# Patient Record
Sex: MALE | Employment: FULL TIME | URBAN - METROPOLITAN AREA
[De-identification: names, ages, dates, MRNs, and addresses within clinical notes are randomized per-mention and may not be internally consistent; named-entity substitution may affect disease eponyms.]

---

## 2020-10-08 ENCOUNTER — TRANSFERRED RECORDS (OUTPATIENT)
Dept: HEALTH INFORMATION MANAGEMENT | Facility: CLINIC | Age: 55
End: 2020-10-08

## 2020-10-08 LAB
ALT SERPL-CCNC: 50 IU/L (ref 8–45)
AST SERPL-CCNC: 46 IU/L (ref 2–40)
CREAT SERPL-MCNC: 1.1 MG/DL (ref 0.57–1.11)
GFR SERPL CREATININE-BSD FRML MDRD: >60 ML/MIN/1.73M2
GLUCOSE SERPL-MCNC: 162 MG/DL (ref 65–100)
INR PPP: 1.1
POTASSIUM SERPL-SCNC: 3.5 MMOL/L (ref 3.5–5)

## 2020-10-09 ENCOUNTER — ANESTHESIA (OUTPATIENT)
Dept: GASTROENTEROLOGY | Facility: CLINIC | Age: 55
DRG: 441 | End: 2020-10-09
Payer: COMMERCIAL

## 2020-10-09 ENCOUNTER — ANESTHESIA EVENT (OUTPATIENT)
Dept: GASTROENTEROLOGY | Facility: CLINIC | Age: 55
DRG: 441 | End: 2020-10-09
Payer: COMMERCIAL

## 2020-10-09 ENCOUNTER — APPOINTMENT (OUTPATIENT)
Dept: GENERAL RADIOLOGY | Facility: CLINIC | Age: 55
DRG: 441 | End: 2020-10-09
Attending: INTERNAL MEDICINE
Payer: COMMERCIAL

## 2020-10-09 ENCOUNTER — HOSPITAL ENCOUNTER (INPATIENT)
Facility: CLINIC | Age: 55
LOS: 3 days | Discharge: HOME | DRG: 441 | End: 2020-10-12
Attending: INTERNAL MEDICINE | Admitting: INTERNAL MEDICINE
Payer: COMMERCIAL

## 2020-10-09 DIAGNOSIS — I85.01 BLEEDING ESOPHAGEAL VARICES, UNSPECIFIED ESOPHAGEAL VARICES TYPE (H): Primary | ICD-10-CM

## 2020-10-09 PROBLEM — K92.2 GI BLEED: Status: ACTIVE | Noted: 2020-10-09

## 2020-10-09 LAB
ABO + RH BLD: NORMAL
ABO + RH BLD: NORMAL
ALBUMIN SERPL-MCNC: 2.9 G/DL (ref 3.4–5)
ALP SERPL-CCNC: 85 U/L (ref 40–150)
ALT SERPL W P-5'-P-CCNC: 52 U/L (ref 0–70)
ANION GAP SERPL CALCULATED.3IONS-SCNC: 5 MMOL/L (ref 3–14)
ANION GAP SERPL CALCULATED.3IONS-SCNC: 5 MMOL/L (ref 3–14)
APTT PPP: 28 SEC (ref 22–37)
AST SERPL W P-5'-P-CCNC: 36 U/L (ref 0–45)
BASE DEFICIT BLDV-SCNC: 3.1 MMOL/L
BASOPHILS # BLD AUTO: 0.1 10E9/L (ref 0–0.2)
BASOPHILS # BLD AUTO: 0.1 10E9/L (ref 0–0.2)
BASOPHILS NFR BLD AUTO: 0.5 %
BASOPHILS NFR BLD AUTO: 0.7 %
BILIRUB SERPL-MCNC: 0.4 MG/DL (ref 0.2–1.3)
BLD GP AB SCN SERPL QL: NORMAL
BLD PROD TYP BPU: NORMAL
BLD UNIT ID BPU: 0
BLD UNIT ID BPU: 0
BLOOD BANK CMNT PATIENT-IMP: NORMAL
BLOOD PRODUCT CODE: NORMAL
BLOOD PRODUCT CODE: NORMAL
BPU ID: NORMAL
BPU ID: NORMAL
BUN SERPL-MCNC: 29 MG/DL (ref 7–30)
BUN SERPL-MCNC: 32 MG/DL (ref 7–30)
CALCIUM SERPL-MCNC: 7.3 MG/DL (ref 8.5–10.1)
CALCIUM SERPL-MCNC: 8.5 MG/DL (ref 8.5–10.1)
CHLORIDE SERPL-SCNC: 112 MMOL/L (ref 94–109)
CHLORIDE SERPL-SCNC: 114 MMOL/L (ref 94–109)
CO2 SERPL-SCNC: 23 MMOL/L (ref 20–32)
CO2 SERPL-SCNC: 24 MMOL/L (ref 20–32)
CREAT SERPL-MCNC: 0.88 MG/DL (ref 0.66–1.25)
CREAT SERPL-MCNC: 0.89 MG/DL (ref 0.66–1.25)
DIFFERENTIAL METHOD BLD: ABNORMAL
DIFFERENTIAL METHOD BLD: ABNORMAL
EOSINOPHIL # BLD AUTO: 0.3 10E9/L (ref 0–0.7)
EOSINOPHIL # BLD AUTO: 0.6 10E9/L (ref 0–0.7)
EOSINOPHIL NFR BLD AUTO: 3 %
EOSINOPHIL NFR BLD AUTO: 3.4 %
ERYTHROCYTE [DISTWIDTH] IN BLOOD BY AUTOMATED COUNT: 15.3 % (ref 10–15)
ERYTHROCYTE [DISTWIDTH] IN BLOOD BY AUTOMATED COUNT: 15.3 % (ref 10–15)
FERRITIN SERPL-MCNC: 11 NG/ML (ref 26–388)
FIBRINOGEN PPP-MCNC: 257 MG/DL (ref 200–420)
GFR SERPL CREATININE-BSD FRML MDRD: >90 ML/MIN/{1.73_M2}
GFR SERPL CREATININE-BSD FRML MDRD: >90 ML/MIN/{1.73_M2}
GLUCOSE BLDC GLUCOMTR-MCNC: 114 MG/DL (ref 70–99)
GLUCOSE BLDC GLUCOMTR-MCNC: 146 MG/DL (ref 70–99)
GLUCOSE BLDC GLUCOMTR-MCNC: 156 MG/DL (ref 70–99)
GLUCOSE BLDC GLUCOMTR-MCNC: 186 MG/DL (ref 70–99)
GLUCOSE SERPL-MCNC: 173 MG/DL (ref 70–99)
GLUCOSE SERPL-MCNC: 192 MG/DL (ref 70–99)
HBA1C MFR BLD: 6.9 % (ref 0–5.6)
HCO3 BLDV-SCNC: 23 MMOL/L (ref 21–28)
HCT VFR BLD AUTO: 30 % (ref 40–53)
HCT VFR BLD AUTO: 33.1 % (ref 40–53)
HGB BLD-MCNC: 10.1 G/DL (ref 13.3–17.7)
HGB BLD-MCNC: 8.4 G/DL (ref 13.3–17.7)
HGB BLD-MCNC: 8.9 G/DL (ref 13.3–17.7)
HGB BLD-MCNC: 9 G/DL (ref 13.3–17.7)
IMM GRANULOCYTES # BLD: 0 10E9/L (ref 0–0.4)
IMM GRANULOCYTES # BLD: 0.1 10E9/L (ref 0–0.4)
IMM GRANULOCYTES NFR BLD: 0.4 %
IMM GRANULOCYTES NFR BLD: 0.7 %
INR PPP: 1.15 (ref 0.86–1.14)
INR PPP: 1.19 (ref 0.86–1.14)
IRON SATN MFR SERPL: 15 % (ref 15–46)
IRON SERPL-MCNC: 62 UG/DL (ref 35–180)
LABORATORY COMMENT REPORT: NORMAL
LACTATE BLD-SCNC: 1.9 MMOL/L (ref 0.7–2)
LYMPHOCYTES # BLD AUTO: 2.3 10E9/L (ref 0.8–5.3)
LYMPHOCYTES # BLD AUTO: 5.2 10E9/L (ref 0.8–5.3)
LYMPHOCYTES NFR BLD AUTO: 24.5 %
LYMPHOCYTES NFR BLD AUTO: 30.2 %
MCH RBC QN AUTO: 27.9 PG (ref 26.5–33)
MCH RBC QN AUTO: 28.3 PG (ref 26.5–33)
MCHC RBC AUTO-ENTMCNC: 29.7 G/DL (ref 31.5–36.5)
MCHC RBC AUTO-ENTMCNC: 30.5 G/DL (ref 31.5–36.5)
MCV RBC AUTO: 93 FL (ref 78–100)
MCV RBC AUTO: 94 FL (ref 78–100)
MONOCYTES # BLD AUTO: 1 10E9/L (ref 0–1.3)
MONOCYTES # BLD AUTO: 2.2 10E9/L (ref 0–1.3)
MONOCYTES NFR BLD AUTO: 10.5 %
MONOCYTES NFR BLD AUTO: 12.6 %
NEUTROPHILS # BLD AUTO: 5.8 10E9/L (ref 1.6–8.3)
NEUTROPHILS # BLD AUTO: 9.1 10E9/L (ref 1.6–8.3)
NEUTROPHILS NFR BLD AUTO: 52.4 %
NEUTROPHILS NFR BLD AUTO: 61.1 %
NRBC # BLD AUTO: 0 10*3/UL
NRBC # BLD AUTO: 0 10*3/UL
NRBC BLD AUTO-RTO: 0 /100
NRBC BLD AUTO-RTO: 0 /100
NUM BPU REQUESTED: 2
O2/TOTAL GAS SETTING VFR VENT: NORMAL %
PCO2 BLDV: 42 MM HG (ref 40–50)
PH BLDV: 7.34 PH (ref 7.32–7.43)
PLATELET # BLD AUTO: 114 10E9/L (ref 150–450)
PLATELET # BLD AUTO: 182 10E9/L (ref 150–450)
PO2 BLDV: 43 MM HG (ref 25–47)
POTASSIUM SERPL-SCNC: 4.5 MMOL/L (ref 3.4–5.3)
POTASSIUM SERPL-SCNC: 4.6 MMOL/L (ref 3.4–5.3)
PROT SERPL-MCNC: 6.3 G/DL (ref 6.8–8.8)
RBC # BLD AUTO: 3.19 10E12/L (ref 4.4–5.9)
RBC # BLD AUTO: 3.57 10E12/L (ref 4.4–5.9)
SARS-COV-2 RNA SPEC QL NAA+PROBE: NEGATIVE
SARS-COV-2 RNA SPEC QL NAA+PROBE: NORMAL
SODIUM SERPL-SCNC: 140 MMOL/L (ref 133–144)
SODIUM SERPL-SCNC: 143 MMOL/L (ref 133–144)
SPECIMEN EXP DATE BLD: NORMAL
SPECIMEN SOURCE: NORMAL
SPECIMEN SOURCE: NORMAL
TIBC SERPL-MCNC: 417 UG/DL (ref 240–430)
TRANSFUSION STATUS PATIENT QL: NORMAL
UPPER GI ENDOSCOPY: NORMAL
UPPER GI ENDOSCOPY: NORMAL
WBC # BLD AUTO: 17.3 10E9/L (ref 4–11)
WBC # BLD AUTO: 9.5 10E9/L (ref 4–11)

## 2020-10-09 PROCEDURE — 86901 BLOOD TYPING SEROLOGIC RH(D): CPT | Performed by: STUDENT IN AN ORGANIZED HEALTH CARE EDUCATION/TRAINING PROGRAM

## 2020-10-09 PROCEDURE — U0003 INFECTIOUS AGENT DETECTION BY NUCLEIC ACID (DNA OR RNA); SEVERE ACUTE RESPIRATORY SYNDROME CORONAVIRUS 2 (SARS-COV-2) (CORONAVIRUS DISEASE [COVID-19]), AMPLIFIED PROBE TECHNIQUE, MAKING USE OF HIGH THROUGHPUT TECHNOLOGIES AS DESCRIBED BY CMS-2020-01-R: HCPCS | Performed by: STUDENT IN AN ORGANIZED HEALTH CARE EDUCATION/TRAINING PROGRAM

## 2020-10-09 PROCEDURE — 83605 ASSAY OF LACTIC ACID: CPT | Performed by: STUDENT IN AN ORGANIZED HEALTH CARE EDUCATION/TRAINING PROGRAM

## 2020-10-09 PROCEDURE — 250N000009 HC RX 250: Performed by: STUDENT IN AN ORGANIZED HEALTH CARE EDUCATION/TRAINING PROGRAM

## 2020-10-09 PROCEDURE — 83036 HEMOGLOBIN GLYCOSYLATED A1C: CPT | Performed by: STUDENT IN AN ORGANIZED HEALTH CARE EDUCATION/TRAINING PROGRAM

## 2020-10-09 PROCEDURE — 94002 VENT MGMT INPAT INIT DAY: CPT

## 2020-10-09 PROCEDURE — C9113 INJ PANTOPRAZOLE SODIUM, VIA: HCPCS | Performed by: STUDENT IN AN ORGANIZED HEALTH CARE EDUCATION/TRAINING PROGRAM

## 2020-10-09 PROCEDURE — 85025 COMPLETE CBC W/AUTO DIFF WBC: CPT | Performed by: STUDENT IN AN ORGANIZED HEALTH CARE EDUCATION/TRAINING PROGRAM

## 2020-10-09 PROCEDURE — 85027 COMPLETE CBC AUTOMATED: CPT | Performed by: STUDENT IN AN ORGANIZED HEALTH CARE EDUCATION/TRAINING PROGRAM

## 2020-10-09 PROCEDURE — 85610 PROTHROMBIN TIME: CPT | Performed by: STUDENT IN AN ORGANIZED HEALTH CARE EDUCATION/TRAINING PROGRAM

## 2020-10-09 PROCEDURE — P9016 RBC LEUKOCYTES REDUCED: HCPCS | Performed by: STUDENT IN AN ORGANIZED HEALTH CARE EDUCATION/TRAINING PROGRAM

## 2020-10-09 PROCEDURE — 250N000012 HC RX MED GY IP 250 OP 636 PS 637: Performed by: STUDENT IN AN ORGANIZED HEALTH CARE EDUCATION/TRAINING PROGRAM

## 2020-10-09 PROCEDURE — 999N001017 HC STATISTIC GLUCOSE BY METER IP

## 2020-10-09 PROCEDURE — 85384 FIBRINOGEN ACTIVITY: CPT | Performed by: STUDENT IN AN ORGANIZED HEALTH CARE EDUCATION/TRAINING PROGRAM

## 2020-10-09 PROCEDURE — 250N000011 HC RX IP 250 OP 636

## 2020-10-09 PROCEDURE — 99222 1ST HOSP IP/OBS MODERATE 55: CPT | Mod: 25 | Performed by: INTERNAL MEDICINE

## 2020-10-09 PROCEDURE — P9041 ALBUMIN (HUMAN),5%, 50ML: HCPCS | Performed by: STUDENT IN AN ORGANIZED HEALTH CARE EDUCATION/TRAINING PROGRAM

## 2020-10-09 PROCEDURE — 36415 COLL VENOUS BLD VENIPUNCTURE: CPT | Performed by: STUDENT IN AN ORGANIZED HEALTH CARE EDUCATION/TRAINING PROGRAM

## 2020-10-09 PROCEDURE — 250N000011 HC RX IP 250 OP 636: Performed by: STUDENT IN AN ORGANIZED HEALTH CARE EDUCATION/TRAINING PROGRAM

## 2020-10-09 PROCEDURE — 36415 COLL VENOUS BLD VENIPUNCTURE: CPT | Performed by: INTERNAL MEDICINE

## 2020-10-09 PROCEDURE — 258N000003 HC RX IP 258 OP 636: Performed by: STUDENT IN AN ORGANIZED HEALTH CARE EDUCATION/TRAINING PROGRAM

## 2020-10-09 PROCEDURE — 0DJ08ZZ INSPECTION OF UPPER INTESTINAL TRACT, VIA NATURAL OR ARTIFICIAL OPENING ENDOSCOPIC: ICD-10-PCS | Performed by: INTERNAL MEDICINE

## 2020-10-09 PROCEDURE — 999N000185 HC STATISTIC TRANSPORT TIME EA 15 MIN

## 2020-10-09 PROCEDURE — 82803 BLOOD GASES ANY COMBINATION: CPT | Performed by: STUDENT IN AN ORGANIZED HEALTH CARE EDUCATION/TRAINING PROGRAM

## 2020-10-09 PROCEDURE — 96372 THER/PROPH/DIAG INJ SC/IM: CPT | Performed by: STUDENT IN AN ORGANIZED HEALTH CARE EDUCATION/TRAINING PROGRAM

## 2020-10-09 PROCEDURE — 43244 EGD VARICES LIGATION: CPT | Mod: 52 | Performed by: INTERNAL MEDICINE

## 2020-10-09 PROCEDURE — 85730 THROMBOPLASTIN TIME PARTIAL: CPT | Performed by: STUDENT IN AN ORGANIZED HEALTH CARE EDUCATION/TRAINING PROGRAM

## 2020-10-09 PROCEDURE — 99152 MOD SED SAME PHYS/QHP 5/>YRS: CPT | Performed by: INTERNAL MEDICINE

## 2020-10-09 PROCEDURE — 86923 COMPATIBILITY TEST ELECTRIC: CPT | Performed by: STUDENT IN AN ORGANIZED HEALTH CARE EDUCATION/TRAINING PROGRAM

## 2020-10-09 PROCEDURE — 80048 BASIC METABOLIC PNL TOTAL CA: CPT | Performed by: STUDENT IN AN ORGANIZED HEALTH CARE EDUCATION/TRAINING PROGRAM

## 2020-10-09 PROCEDURE — 71045 X-RAY EXAM CHEST 1 VIEW: CPT | Mod: 26 | Performed by: RADIOLOGY

## 2020-10-09 PROCEDURE — 43235 EGD DIAGNOSTIC BRUSH WASH: CPT | Performed by: INTERNAL MEDICINE

## 2020-10-09 PROCEDURE — 200N000002 HC R&B ICU UMMC

## 2020-10-09 PROCEDURE — 06L38CZ OCCLUSION OF ESOPHAGEAL VEIN WITH EXTRALUMINAL DEVICE, VIA NATURAL OR ARTIFICIAL OPENING ENDOSCOPIC: ICD-10-PCS | Performed by: INTERNAL MEDICINE

## 2020-10-09 PROCEDURE — 83540 ASSAY OF IRON: CPT | Performed by: STUDENT IN AN ORGANIZED HEALTH CARE EDUCATION/TRAINING PROGRAM

## 2020-10-09 PROCEDURE — 85018 HEMOGLOBIN: CPT | Performed by: STUDENT IN AN ORGANIZED HEALTH CARE EDUCATION/TRAINING PROGRAM

## 2020-10-09 PROCEDURE — 82728 ASSAY OF FERRITIN: CPT | Performed by: STUDENT IN AN ORGANIZED HEALTH CARE EDUCATION/TRAINING PROGRAM

## 2020-10-09 PROCEDURE — 43244 EGD VARICES LIGATION: CPT | Performed by: INTERNAL MEDICINE

## 2020-10-09 PROCEDURE — 999N000065 XR CHEST PORT 1 VW

## 2020-10-09 PROCEDURE — 250N000011 HC RX IP 250 OP 636: Performed by: INTERNAL MEDICINE

## 2020-10-09 PROCEDURE — 999N000157 HC STATISTIC RCP TIME EA 10 MIN

## 2020-10-09 PROCEDURE — G0500 MOD SEDAT ENDO SERVICE >5YRS: HCPCS | Performed by: INTERNAL MEDICINE

## 2020-10-09 PROCEDURE — 370N000001 HC ANESTHESIA TECHNICAL FEE, 1ST 30 MIN: Performed by: INTERNAL MEDICINE

## 2020-10-09 PROCEDURE — 250N000013 HC RX MED GY IP 250 OP 250 PS 637: Performed by: INTERNAL MEDICINE

## 2020-10-09 PROCEDURE — 43235 EGD DIAGNOSTIC BRUSH WASH: CPT | Mod: XE | Performed by: INTERNAL MEDICINE

## 2020-10-09 PROCEDURE — 93005 ELECTROCARDIOGRAM TRACING: CPT

## 2020-10-09 PROCEDURE — 250N000009 HC RX 250: Performed by: INTERNAL MEDICINE

## 2020-10-09 PROCEDURE — 80053 COMPREHEN METABOLIC PANEL: CPT | Performed by: STUDENT IN AN ORGANIZED HEALTH CARE EDUCATION/TRAINING PROGRAM

## 2020-10-09 PROCEDURE — 83550 IRON BINDING TEST: CPT | Performed by: STUDENT IN AN ORGANIZED HEALTH CARE EDUCATION/TRAINING PROGRAM

## 2020-10-09 PROCEDURE — 93010 ELECTROCARDIOGRAM REPORT: CPT | Performed by: INTERNAL MEDICINE

## 2020-10-09 PROCEDURE — 99223 1ST HOSP IP/OBS HIGH 75: CPT | Mod: AI | Performed by: INTERNAL MEDICINE

## 2020-10-09 PROCEDURE — 999N000216 HC STATISTIC ADULT CD FACE TO FACE-NO CHRG

## 2020-10-09 PROCEDURE — 85018 HEMOGLOBIN: CPT | Performed by: INTERNAL MEDICINE

## 2020-10-09 PROCEDURE — 99291 CRITICAL CARE FIRST HOUR: CPT | Mod: GC | Performed by: INTERNAL MEDICINE

## 2020-10-09 PROCEDURE — 86900 BLOOD TYPING SEROLOGIC ABO: CPT | Performed by: STUDENT IN AN ORGANIZED HEALTH CARE EDUCATION/TRAINING PROGRAM

## 2020-10-09 PROCEDURE — 86850 RBC ANTIBODY SCREEN: CPT | Performed by: STUDENT IN AN ORGANIZED HEALTH CARE EDUCATION/TRAINING PROGRAM

## 2020-10-09 PROCEDURE — 5A1935Z RESPIRATORY VENTILATION, LESS THAN 24 CONSECUTIVE HOURS: ICD-10-PCS | Performed by: INTERNAL MEDICINE

## 2020-10-09 RX ORDER — NOREPINEPHRINE BITARTRATE 0.06 MG/ML
.03-.4 INJECTION, SOLUTION INTRAVENOUS CONTINUOUS
Status: DISCONTINUED | OUTPATIENT
Start: 2020-10-09 | End: 2020-10-09

## 2020-10-09 RX ORDER — NALOXONE HYDROCHLORIDE 0.4 MG/ML
.1-.4 INJECTION, SOLUTION INTRAMUSCULAR; INTRAVENOUS; SUBCUTANEOUS
Status: DISCONTINUED | OUTPATIENT
Start: 2020-10-09 | End: 2020-10-12 | Stop reason: HOSPADM

## 2020-10-09 RX ORDER — FENTANYL CITRATE 50 UG/ML
INJECTION, SOLUTION INTRAMUSCULAR; INTRAVENOUS PRN
Status: DISCONTINUED | OUTPATIENT
Start: 2020-10-09 | End: 2020-10-09 | Stop reason: HOSPADM

## 2020-10-09 RX ORDER — NALOXONE HYDROCHLORIDE 0.4 MG/ML
.1-.4 INJECTION, SOLUTION INTRAMUSCULAR; INTRAVENOUS; SUBCUTANEOUS
Status: DISCONTINUED | OUTPATIENT
Start: 2020-10-09 | End: 2020-10-09

## 2020-10-09 RX ORDER — NICOTINE POLACRILEX 4 MG
15-30 LOZENGE BUCCAL
Status: DISCONTINUED | OUTPATIENT
Start: 2020-10-09 | End: 2020-10-12 | Stop reason: HOSPADM

## 2020-10-09 RX ORDER — FENTANYL CITRATE 50 UG/ML
INJECTION, SOLUTION INTRAMUSCULAR; INTRAVENOUS
Status: COMPLETED
Start: 2020-10-09 | End: 2020-10-09

## 2020-10-09 RX ORDER — FENTANYL CITRATE 50 UG/ML
50 INJECTION, SOLUTION INTRAMUSCULAR; INTRAVENOUS ONCE
Status: COMPLETED | OUTPATIENT
Start: 2020-10-09 | End: 2020-10-09

## 2020-10-09 RX ORDER — NADOLOL 20 MG/1
20 TABLET ORAL DAILY
Status: ON HOLD | COMMUNITY
End: 2020-10-12

## 2020-10-09 RX ORDER — PROPOFOL 10 MG/ML
INJECTION, EMULSION INTRAVENOUS PRN
Status: DISCONTINUED | OUTPATIENT
Start: 2020-10-09 | End: 2020-10-09

## 2020-10-09 RX ORDER — METOCLOPRAMIDE HYDROCHLORIDE 5 MG/ML
10 INJECTION INTRAMUSCULAR; INTRAVENOUS ONCE
Status: DISCONTINUED | OUTPATIENT
Start: 2020-10-09 | End: 2020-10-12 | Stop reason: HOSPADM

## 2020-10-09 RX ORDER — CEFTRIAXONE 1 G/1
1 INJECTION, POWDER, FOR SOLUTION INTRAMUSCULAR; INTRAVENOUS EVERY 24 HOURS
Status: DISCONTINUED | OUTPATIENT
Start: 2020-10-09 | End: 2020-10-12

## 2020-10-09 RX ORDER — SIMETHICONE
LIQUID (ML) MISCELLANEOUS PRN
Status: DISCONTINUED | OUTPATIENT
Start: 2020-10-09 | End: 2020-10-09 | Stop reason: HOSPADM

## 2020-10-09 RX ORDER — CEFTRIAXONE 1 G/1
1 INJECTION, POWDER, FOR SOLUTION INTRAMUSCULAR; INTRAVENOUS EVERY 24 HOURS
Status: DISCONTINUED | OUTPATIENT
Start: 2020-10-09 | End: 2020-10-09

## 2020-10-09 RX ORDER — LIDOCAINE 40 MG/G
CREAM TOPICAL
Status: DISCONTINUED | OUTPATIENT
Start: 2020-10-09 | End: 2020-10-12 | Stop reason: HOSPADM

## 2020-10-09 RX ORDER — DEXTROSE MONOHYDRATE 25 G/50ML
25-50 INJECTION, SOLUTION INTRAVENOUS
Status: DISCONTINUED | OUTPATIENT
Start: 2020-10-09 | End: 2020-10-12 | Stop reason: HOSPADM

## 2020-10-09 RX ORDER — ALBUMIN, HUMAN INJ 5% 5 %
500 SOLUTION INTRAVENOUS ONCE
Status: COMPLETED | OUTPATIENT
Start: 2020-10-09 | End: 2020-10-09

## 2020-10-09 RX ORDER — DEXMEDETOMIDINE HYDROCHLORIDE 4 UG/ML
0.2-0.7 INJECTION, SOLUTION INTRAVENOUS CONTINUOUS
Status: DISCONTINUED | OUTPATIENT
Start: 2020-10-09 | End: 2020-10-10

## 2020-10-09 RX ORDER — PANTOPRAZOLE SODIUM 40 MG/1
40 TABLET, DELAYED RELEASE ORAL 2 TIMES DAILY
Status: ON HOLD | COMMUNITY
End: 2020-10-12

## 2020-10-09 RX ORDER — PROPOFOL 10 MG/ML
5-75 INJECTION, EMULSION INTRAVENOUS CONTINUOUS
Status: DISCONTINUED | OUTPATIENT
Start: 2020-10-09 | End: 2020-10-09

## 2020-10-09 RX ADMIN — MIDAZOLAM 2 MG: 1 INJECTION INTRAMUSCULAR; INTRAVENOUS at 16:28

## 2020-10-09 RX ADMIN — PROPOFOL 100 MG: 10 INJECTION, EMULSION INTRAVENOUS at 12:53

## 2020-10-09 RX ADMIN — INSULIN ASPART 1 UNITS: 100 INJECTION, SOLUTION INTRAVENOUS; SUBCUTANEOUS at 13:47

## 2020-10-09 RX ADMIN — PANTOPRAZOLE SODIUM 40 MG: 40 INJECTION, POWDER, FOR SOLUTION INTRAVENOUS at 19:47

## 2020-10-09 RX ADMIN — PROPOFOL 20 MCG/KG/MIN: 10 INJECTION, EMULSION INTRAVENOUS at 16:24

## 2020-10-09 RX ADMIN — FENTANYL CITRATE 50 MCG: 50 INJECTION INTRAMUSCULAR; INTRAVENOUS at 13:48

## 2020-10-09 RX ADMIN — FENTANYL CITRATE 50 MCG: 50 INJECTION INTRAMUSCULAR; INTRAVENOUS at 13:57

## 2020-10-09 RX ADMIN — SODIUM CHLORIDE 1000 ML: 9 INJECTION, SOLUTION INTRAVENOUS at 05:34

## 2020-10-09 RX ADMIN — Medication 50 MCG/HR: at 13:35

## 2020-10-09 RX ADMIN — MIDAZOLAM 2 MG: 1 INJECTION INTRAMUSCULAR; INTRAVENOUS at 13:37

## 2020-10-09 RX ADMIN — Medication 50 MG: at 12:59

## 2020-10-09 RX ADMIN — DEXMEDETOMIDINE 0.3 MCG/KG/HR: 100 INJECTION, SOLUTION, CONCENTRATE INTRAVENOUS at 18:36

## 2020-10-09 RX ADMIN — MIDAZOLAM 2 MG: 1 INJECTION INTRAMUSCULAR; INTRAVENOUS at 14:20

## 2020-10-09 RX ADMIN — ALBUMIN HUMAN 500 ML: 0.05 INJECTION, SOLUTION INTRAVENOUS at 21:37

## 2020-10-09 RX ADMIN — OCTREOTIDE ACETATE 50 MCG/HR: 200 INJECTION, SOLUTION INTRAVENOUS; SUBCUTANEOUS at 05:22

## 2020-10-09 RX ADMIN — PANTOPRAZOLE SODIUM 40 MG: 40 INJECTION, POWDER, FOR SOLUTION INTRAVENOUS at 04:57

## 2020-10-09 RX ADMIN — MIDAZOLAM 2 MG: 1 INJECTION INTRAMUSCULAR; INTRAVENOUS at 18:52

## 2020-10-09 RX ADMIN — PANTOPRAZOLE SODIUM 8 MG/HR: 40 INJECTION, POWDER, FOR SOLUTION INTRAVENOUS at 13:57

## 2020-10-09 RX ADMIN — MIDAZOLAM 2 MG: 1 INJECTION INTRAMUSCULAR; INTRAVENOUS at 13:57

## 2020-10-09 RX ADMIN — SODIUM CHLORIDE, POTASSIUM CHLORIDE, SODIUM LACTATE AND CALCIUM CHLORIDE 1000 ML: 600; 310; 30; 20 INJECTION, SOLUTION INTRAVENOUS at 19:05

## 2020-10-09 RX ADMIN — INSULIN ASPART 1 UNITS: 100 INJECTION, SOLUTION INTRAVENOUS; SUBCUTANEOUS at 16:24

## 2020-10-09 RX ADMIN — FENTANYL CITRATE 50 MCG: 50 INJECTION, SOLUTION INTRAMUSCULAR; INTRAVENOUS at 13:48

## 2020-10-09 RX ADMIN — PROPOFOL 20 MCG/KG/MIN: 10 INJECTION, EMULSION INTRAVENOUS at 14:21

## 2020-10-09 RX ADMIN — SODIUM CHLORIDE, POTASSIUM CHLORIDE, SODIUM LACTATE AND CALCIUM CHLORIDE 1000 ML: 600; 310; 30; 20 INJECTION, SOLUTION INTRAVENOUS at 20:10

## 2020-10-09 RX ADMIN — Medication 50 MCG: at 16:24

## 2020-10-09 RX ADMIN — INSULIN ASPART 1 UNITS: 100 INJECTION, SOLUTION INTRAVENOUS; SUBCUTANEOUS at 08:13

## 2020-10-09 ASSESSMENT — ACTIVITIES OF DAILY LIVING (ADL)
CONCENTRATING,_REMEMBERING_OR_MAKING_DECISIONS_DIFFICULTY: NO
FALL_HISTORY_WITHIN_LAST_SIX_MONTHS: NO
ADLS_ACUITY_SCORE: 21
ADLS_ACUITY_SCORE: 21
TOILETING_ISSUES: NO
DIFFICULTY_COMMUNICATING: NO
VISION_MANAGEMENT: CONTACTS
WALKING_OR_CLIMBING_STAIRS_DIFFICULTY: NO
WEAR_GLASSES_OR_BLIND: YES
DOING_ERRANDS_INDEPENDENTLY_DIFFICULTY: NO
DIFFICULTY_EATING/SWALLOWING: NO
ADLS_ACUITY_SCORE: 19
DRESSING/BATHING_DIFFICULTY: NO

## 2020-10-09 NOTE — CONSULTS
HEPATOLOGY CONSULTATION    Date: 10/09/2020  Date of Admission: 10/9/2020  Reason for Consultation: GIB  Requested by:   Dr. Maxwell  Brief Summary:   We were asked by Dr. Maxwell to evaluate this patient with hematemesis with Hx of alcohol related liver disease and EV      ASSESSMENT AND RECOMMENDATIONS:   Assessment:  55 year old male with a history of alcohol related liver disease, esophageal variceal bleeding s/p banding x2, who is admitted with concern of hematemesis.      Alcohol related liver disease with portal hypertension  Mild thrombocytopenia and coagulopathy  Acute anemia with hematemesis concern for variceal bleeding    Pt had alcohol related liver disease with portal hypertension.  Pt had hx of variceal bleed x2 with recent EGD 4 weeks back, he presented with 2 points drop in his hemoglobin concerning for upper GI source of bleeding and likely to be variceal bleed given his prior Hx. He will require endoscopic evaluation pending the result of his COVID test.     MELD-Na score: 8 at 10/9/2020  1:35 PM  MELD score: 8 at 10/9/2020  1:35 PM  Calculated from:  Serum Creatinine: 0.88 mg/dL (Rounded to 1 mg/dL) at 10/9/2020  1:35 PM  Serum Sodium: 143 mmol/L (Rounded to 137 mmol/L) at 10/9/2020  1:35 PM  Total Bilirubin: 0.4 mg/dL (Rounded to 1 mg/dL) at 10/9/2020  5:01 AM  INR(ratio): 1.19 at 10/9/2020  1:35 PM  Age: 55 years 4 months    Recommendations  --Keep NPO for possible EGD today  --Stat COVID test ordered, result pending  --Continue octreotide infusion  --Continue ABx for course of 5-7 days  --Continue PPI IV BID  --Continue to monitor Hgb and transfuse if <7  --Hold PTA nadolol, can restart once stable later during admission  --Recommend to get US abd with doppler    Gastroenterology outpatient follow up recommendations: pending clinical course    Thank you for involving us in this patient's care. Please do not hesitate to contact the GI service with any questions or concerns.     Pt care plan  discussed with Dr. Leon, hepatology staff physician.    This note was created with voice recognition software, and while reviewed for accuracy, typos may remain.    Samuel Rios MD  GI Fellow  Pager: [phone]  -------------------------------------------------------------------------------------------------------------------           History of Present Illness:   Federico Bentley is a 55 year old male with a history of alcohol related liver disease, esophageal variceal bleeding s/p banding x2, who is admitted with concern of hematemesis.    Patient stated that last night around 9 PM he started having abdominal fullness, 10:30 PM he woke up from sleep 4 episodes of coffee-ground emesis which were similar to his episode of GI bleeding in May 2020, he felt dizzy and presented to the hospital for further evaluation.  Denied any abdominal pain hematochezia or melena, no fever or chills.    Smokes half pack per day  Last alcohol drink was 9/4/20, he has been drinking heavily since February 2020 and before that he used to drink 2-3 drinks per day with couple of drinks over the weekend since age 20.  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  No family history of alcohol use disorder, liver or colon cancer            Past Medical History:   Reviewed and edited as appropriate  No past medical history on file.         Past Surgical History:   Reviewed and edited as appropriate   No past surgical history on file.         Previous Endoscopy:   No results found for this or any previous visit.         Social History:   Reviewed and edited as appropriate  Social History     Socioeconomic History     Marital status: Not on file     Spouse name: Not on file     Number of children: Not on file     Years of education: Not on file     Highest education level: Not on file   Occupational History     Not on file   Social Needs     Financial resource strain: Not on file     Food insecurity      Worry: Not on file     Inability: Not on file     Transportation needs     Medical: Not on file     Non-medical: Not on file   Tobacco Use     Smoking status: Not on file   Substance and Sexual Activity     Alcohol use: Not on file     Drug use: Not on file     Sexual activity: Not on file   Lifestyle     Physical activity     Days per week: Not on file     Minutes per session: Not on file     Stress: Not on file   Relationships     Social connections     Talks on phone: Not on file     Gets together: Not on file     Attends Synagogue service: Not on file     Active member of club or organization: Not on file     Attends meetings of clubs or organizations: Not on file     Relationship status: Not on file     Intimate partner violence     Fear of current or ex partner: Not on file     Emotionally abused: Not on file     Physically abused: Not on file     Forced sexual activity: Not on file   Other Topics Concern     Not on file   Social History Narrative     Not on file            Family History:   Reviewed and edited as appropriate  No family history on file.           Allergies:   Reviewed and edited as appropriate   No Known Allergies         Medications:     Current Facility-Administered Medications   Medication     cefTRIAXone (ROCEPHIN) 1 g vial to attach to  mL bag for ADULTS or NS 50 mL bag for PEDS     glucose gel 15-30 g    Or     dextrose 50 % injection 25-50 mL    Or     glucagon injection 1 mg     [START ON 10/10/2020] influenza recomb quadrivalent PF (FLUBLOK) injection 0.5 mL     insulin aspart (NovoLOG) injection (RAPID ACTING)     lidocaine (LMX4) cream     lidocaine 1 % 0.1-1 mL     melatonin tablet 1 mg     naloxone (NARCAN) injection 0.1-0.4 mg     octreotide (sandoSTATIN) 1,250 mcg in sodium chloride 0.9 % 250 mL     pantoprazole (PROTONIX) IV push injection 40 mg     sodium chloride (PF) 0.9% PF flush 3 mL     sodium chloride (PF) 0.9% PF flush 3 mL             Review of Systems:     A  complete 10 point review of systems was performed and is negative except as noted in the HPI           Physical Exam:   /72   Pulse 75   Temp 97.7  F (36.5  C) (Oral)   Resp 17   Wt 82.9 kg (182 lb 12.2 oz)   SpO2 96%   Wt:   Wt Readings from Last 2 Encounters:   10/09/20 82.9 kg (182 lb 12.2 oz)      Constitutional: cooperative, pleasant, no acute distress  Eyes: Sclera anicteric  Ears/nose/mouth/throat: mucus membranes moist  Neck: supple  CV: No edema  Respiratory: Unlabored breathing  Abd: nondistended, +bs, nontender  Skin: warm, perfused, no jaundice  Neuro: AAO x 3, No asterixis           Data:   Labs and imaging below were independently reviewed and interpreted    BMP  Recent Labs   Lab 10/09/20  0501      POTASSIUM 4.5   CHLORIDE 112*   CONSTANZA 8.5   CO2 23   BUN 29   CR 0.89   *     CBC  Recent Labs   Lab 10/09/20  0501   WBC 9.5   RBC 3.57*   HGB 10.1*   HCT 33.1*   MCV 93   MCH 28.3   MCHC 30.5*   RDW 15.3*   *     INR  Recent Labs   Lab 10/09/20  0501   INR 1.15*     LFTs  Recent Labs   Lab 10/09/20  0501   ALKPHOS 85   AST 36   ALT 52   BILITOTAL 0.4   PROTTOTAL 6.3*   ALBUMIN 2.9*      PANCNo lab results found in last 7 days.    Imaging:  MRI ABD 9/5/20:  IMPRESSION:  1.  Liver demonstrates mild atrophy and nodularity of the serosal surface,  likely reflective of underlying chronic hepatic parenchymal disease and  cirrhosis.    2.  Evidence of portal venous hypertension with splenomegaly, upper abdominal  varices, and trace perihepatic ascites.    3.  No evidence of suspicious liver mass.    EGD 9/4/20:  Impressions/Post-Op Diagnosis:       - Large (> 5 mm) esophageal varices. Banded.       - Clotted blood in the stomach.       - Portal hypertensive gastropathy.       - Normal examined duodenum.       - No specimens collected.    Recommendation:       - Clear liquid diet today.       - Continue IV PPI, octreotide       - Ceftriaxone 1 gm IV daily       - May need pain  medication for chest pain post-banding       - Repeat EGD in 2-3 weeks.       - Can do screening colonoscopy at that time.       ECHO 5/22/20:     Final Conclusion   Visually Estimated EF: 65-70%   Normal LV size and systolic function.   No significant valvular heart disease noted.   Dilated IVC size.      EGD 5/21/20:  Impressions/Post-Op Diagnosis:       - Large (> 5 mm) esophageal varices with red mary signs. Completely        eradicated. Banded.       - Portal hypertensive gastropathy.       - Normal examined duodenum.       - No specimens collected.    Recommendation:       - [facility] patient to [facility] for [facility] care.       - Octreotide 72 hours.       - PPI for 48 hours.       - Patient is not known to have liver disease, will order RUQ U/S with        dopplers.       - Will initiate antibiotics, 5 days total.       - Repeat upper endoscopy in 4 weeks for reassessment.       - Will arrange clinic follow-up in our hepatology clinic.      Attestation:  This patient has been seen and evaluated by me, Daniela Leon.  Discussed with the house staff team or resident(s) and agree with the findings and plan in this note.

## 2020-10-09 NOTE — OR NURSING
EGD with banding at bedside. Sedation and monitoring managed by ICU RN. Left in the care of ICU staff post-procedure.     July Sears RN

## 2020-10-09 NOTE — PROGRESS NOTES
Pt admitted to 4C MICU s/p intubation in endoscopy due to GIB (gastrointestinal bleeding). Pt placed on a CMV 12 500 50% +5. Please see flowsheets for further information.

## 2020-10-09 NOTE — H&P
Internal Medicine History and Physical  [facility], [place]  Date of Admission: October 9, 2020  Chief Complaint: Hematemesis  -----------------------------------------------------------------  History of Present Illness   Federico reports he was feeling a little sick to his stomach today and then at 10 PM threw up 4 times, dark red and black liquid similar to his previous episodes of hematemesis in May and September of this year.  He reports normal brown stools today.  He presented to [facility] emergency department where he was noted to be tachycardic to the 130s and hypotensive to the 70s over 50s which improved after 1 L fluid bolus to systolics over 100.  His tachycardia resolved.  He was given Protonix and Rocephin and was started on octreotide infusion. Initial hemoglobin was stable from prior admission but repeat showed a 2 g drop.  He had no further episodes of hematemesis and was transferred to our hospital due to lack of bed availability at [facility].     He was unaware of any health problems prior to his upper GI bleed in May at which point he was told he had liver disease due to alcohol use and subsequently stopped drinking.  He has not had any alcohol since that hospital admission.  Prior to that admission he was drinking 4-5 drinks a day 4-5 times a week. He is currently in the process of establishing with a gastroenterologist at [facility].  He recently had an MRI abdomen in September which was consistent with the diagnosis of cirrhosis and portal venous hypertension with splenomegaly, upper abdominal varices, and trace perihepatic ascites, and was negative for any liver mass.  During his September admission he underwent an endoscopy with banding of large, over 5 mm, varices.  Endoscopy also revealed portal hypertensive gastropathy.    He also recently establish care with a PCP in the [facility] system. His re-connection with the health system revealed diagnosis of  diabetes mellitus and he was recently started on metformin.      -----------------------------------------------------------------  Assessment & Plan     Federico Bentley is a 55-year-old male with a past medical history of recently diagnosed hepatic cirrhosis secondary to alcohol use complicated by esophageal varices as well as recently diagnosed type 2 diabetes who is transferred from [facility] for upper GI bleed.    #Upper GI bleed  #Esophageal varices  #Alcoholic cirrhosis  Recently diagnosed cirrhosis confirmed on MRI imaging 9/2020, complicated by history of GI bleed x2 in May and September of this year.  Had upper endoscopy in May and  September with banding of large varices.  Yesterday evening, one episode of hematemesis.  Hemoglobin 10 from 12 in September.  Initially hypotensive but fluid responsive.  No further episodes of emesis and currently hemodynamically stable.  Patient is in the process of establishing care with a gastroenterologist at [facility].  -Repeat hemoglobin  -Type and screen  -Octreotide drip (bolused at outside hospital)  -Pantoprazole twice daily IV  -Ceftriaxone 1 g every 24 hours  -Additional 1 L normal saline  -GI consult  -N.p.o. for possible procedure  -Preprocedure COVID test (cannot find test from [facility] in EMR)  -Holding prior to admission nadolol due to bleeding      #Diabetes mellitus type 2  Hemoglobin A1c 7.8 in September 2020.  On metformin 500 mix twice daily at home  -Hypoglycemia protocol  -Sliding scale insulin    #Tobacco use disorder  Current half pack a day smoker.  In pre-contemplative stage.  -Discussed cessation prior to discharge    Diet: Orders Placed This Encounter      NPO per Anesthesia Guidelines for Procedure/Surgery Except for: Meds    Fluids: Status post 1 L at outside hospital  Lines: PIV  Ebrger Catheter: not present  DVT Prophylaxis: Mechanical  Code Status: Full  Expected discharge: 2 - 3 days +    Patient to be staffed in the  morning       Jessica Lozano MD  Medicine-Pediatrics PGY4    [facility]  Pager: [phone]  -----------------------------------------------------------------  Physical Exam   BP 94/66 (BP Location: Left arm)   Pulse 83   Temp 97.7  F (36.5  C) (Oral)   Resp 18   Wt 82.9 kg (182 lb 12.2 oz)   SpO2 96%     GENERAL: Active, alert, in no acute distress.  SKIN: Clear. No significant rash, no stigmata of liver disease  HEENT: Normocephalic. Pupils equal, round, reactive, Extraocular muscles intact. Normal conjunctivae. Nares without discharge. Oral mucosa non-erythematous. No oral lesions.   NECK: Supple, no masses.    LYMPH NODES: No adenopathy  LUNGS: Clear. No rales, rhonchi, wheezing or retractions  HEART: Regular rhythm. Normal S1/S2. No murmurs. Normal pulses.  ABDOMEN: Obese, soft, non-tender, not distended,  Bowel sounds normal.   NEUROLOGIC: No focal findings. Normal strength and tone.   EXTREMITIES: Full range of motion, no deformities     -----------------------------------------------------------------  Additional Patient History  Review of Systems   The 10 point Review of Systems is negative other than noted in the HPI or here.    Past Medical History    Alcoholic cirrhosis  Diverticular disease status post 18 inches removed  Type 2 diabetes  Upper GI bleed  Esophageal varices  Tobacco use disorder    Past Surgical History   Colectomy  Appendectomy    Social History   Lives with wife has 3 children  Denies alcohol use since Labor Day  No recreational drug use  Smokes half a pack cigarettes a day    Family History   Mother: Diabetes mellitus  Father: Cardiovascular disease  Brother: Diabetes mellitus    Prior to Admission Medications   Protonix 20 mg twice daily  Omeprazole 20 mg daily  Nadolol 20mg daily  Metformin 500mg twice daily    Allergies   Allergy to IV contrast    Data: Reviewed in Epic.

## 2020-10-09 NOTE — PROGRESS NOTES
Admitted/transferred from: OSH  Reason for admission/transfer: Hemoptysis   2 RN skin assessment: completed by Latha SAAVEDRA and Salome BLANDON  Result of skin assessment and interventions/actions: Small scab on the L foot is present, cleansed and left open to air. Otherwise skin is intact.   Height, weight, drug calc weight: done  Patient belongings: Clothing and shoes in patient closet, wedding ring continues to be worn by patient.   MDRO education added to care plan: NA  ?

## 2020-10-09 NOTE — ANESTHESIA CARE TRANSFER NOTE
Patient: Federico Bentley    Procedure(s):  ESOPHAGOGASTRODUODENOSCOPY (EGD)    Diagnosis: GIB (gastrointestinal bleeding) [K92.2]  Diagnosis Additional Information: No value filed.    Anesthesia Type:   No value filed.     Note:  Airway :ETT and Ventilator  Patient transferred to:PACU  Comments: Patient EZ mask, Intubated UDVx1 without problems, BBS, EtCO2, Dentition as pre-op.Handoff Report: Identifed the Patient, Identified the Reponsible Provider, Reviewed the pertinent medical history, Discussed the surgical course, Reviewed Intra-OP anesthesia mangement and issues during anesthesia, Set expectations for post-procedure period and Allowed opportunity for questions and acknowledgement of understanding      Vitals: (Last set prior to Anesthesia Care Transfer)    CRNA VITALS  10/9/2020 1246 - 10/9/2020 1316      10/9/2020             NIBP:  100/55    Ht Rate:  100                Electronically Signed By: ELMER Che CRNA  October 9, 2020  1:16 PM

## 2020-10-09 NOTE — PROGRESS NOTES
Gastroenterology Endoscopy Suite Brief Operative Note    Procedure:  Upper endoscopy   Post-operative diagnosis:  EV bleeding s/p banding   Staff Physician:  Dr. Wheatley   Fellow/Assistant(s):  Gabriel   Specimens:  Please see final procedure note for further details.   Findings:  Spurting esophageal varix    Complications:  None.   Condition:  Stable   Recommendations  Diet:  NPO, avoid NG/OG placement  PPI:  Continue PPI infusion then switch to IV BID  Anti-coagulants/platelets:  N/A  Octreotide:  Continue octreotide drip for 72 hrs

## 2020-10-09 NOTE — H&P
MEDICAL ICU H&P  10/09/2020    Date of Hospital Admission: 10/9/20  Date of ICU Admission: 10/9/20  Reason for Critical Care Admission: Intubation for airway protection  Date of Service (when I saw the patient): 10/09/2020    ASSESSMENT: Federico Bentley is a 55 year old male with PMH suspected cirrhosis c/b esophageal varices, diabetes mellitus, and smoking who was admitted on 10/9/2020 for hematemesis found secondary to bleeding esophageal varices. He is admitted to the ICU for intubation due to airway protection in context of initial hematemesis and recent EGD.    CHANGES and MAJOR THINGS TODAY:   - EGD done, esophageal varices banded x3  - Hypertension likely secondary to anxiety and pain during EGD, subsequent hypotension with blood loss and sedation  - Received 2 units blood   - Remains intubated due to airway protection  - Trending hemoglobin  - Per GI, no plans for repeat scope (with risk of dislodging bands)  - Keep intubated overnight    PLAN:    Neuro:  # Pain and sedation  - Fentanyl gtt and boluses  - Versed boluses prn  - Propofol gtt  - Transition to prns with continued propofol pending GI plan    # Hx alcohol use  # Low concern for alcohol withdrawal  Last drink per patient labor day. Can monitor for signs of withdrawal, low concern given timing.    Pulmonary:  # Intubation for airway protection  Initial active GI bleed found secondary to bleeding esophageal varices. Intubated for EGD due to concern for aspiration of large volume hematemesis. Kept intubated while monitoring for rebleeding and airway protection.  - Will keep on ventilator likely for 12-24 hrs to ensure no rebleeding    Ventilation Mode: CMV/AC  (Continuous Mandatory Ventilation/ Assist Control)  FiO2 (%): 50 %  Rate Set (breaths/minute): 12 breaths/min  Tidal Volume Set (mL): 500 mL  PEEP (cm H2O): 5 cmH2O  Oxygen Concentration (%): 50 %  Resp: 13      Cardiovascular:  # Hypotension due to acute blood loss   Hypotension due to blood  loss with actively bleeding esophageal varices, responding to fluid and blood resuscitation  - S/p 2 units pRBCs  - MAPs>60  - Transfuse as needed  - Bolus IVFs as needed  - Currently without tachycardia, source controlled with GI, closer to euvolemia    GI/Nutrition:  # Upper GI Bleed 2/2 bleeding esophageal varices  Acute blood loss secondary to what was found to be bleeding esophageal varices, secondary to portal hypertension   - NPO, avoid NG/OG placement  - IV PPI BID  - Continue octreotide for 72 hours  - Ceftriaxone 5-7 days for SBP prophylaxis (ascites, current GI bleed)  - Transfuse for hgb>7, s/p 2 units pRBCs  - Repeat EGD in 4 weeks for variceal treatment     # Hx decompensated cirrhosis c/b esophageal varices  Of note, diagnosis of cirrhosis via labs and imaging is a bit unclear, with nodular contour of liver, though without significant synthetic dysfunction or bilirubinemia. Raises question of whether portal hypertension and varices is secondary to non cirrhotic causes.    -- HE: no history   -- EV/GV: Last EGD 9/5/2020 with 4 EVs banded  -- HRS: none, Cr 0.88  -- HPS: none  -- Coagulopathy: INR 1.19, platelets 182  -- Anemia: Hgb 9.0  (Baseline = 13-14)  -- Ascites/SBP: perihepatic ascites on MR abdomen 9/2020  -- Hepatitis viruses: HepA/B/C nonimmune  -- MELD-Na: MELD-Na score: 8 at 10/9/2020  1:35 PM  MELD score: 8 at 10/9/2020  1:35 PM  Calculated from:  Serum Creatinine: 0.88 mg/dL (Rounded to 1 mg/dL) at 10/9/2020  1:35 PM  Serum Sodium: 143 mmol/L (Rounded to 137 mmol/L) at 10/9/2020  1:35 PM  Total Bilirubin: 0.4 mg/dL (Rounded to 1 mg/dL) at 10/9/2020  5:01 AM  INR(ratio): 1.19 at 10/9/2020  1:35 PM  Age: 55 years 4 months     1. Daily MELD Labs  2. Abdm U/S with Doppler  3. Ensure sodium restriction to 2000 mg per day and high protein diet once extubated  4. Encourage continued alcohol cessation  5. Avoid hepatotoxic and nephrotoxic medications  6. Chem dep, GI  following      Renal/Fluids/Electrolytes:  No active issues, lactic acid normal     Endocrine:  # TIIDM  Last A1c 7.9%, glucose here 170 - 192.   - Continue LDSS while not receiving feeds  - Glucose checks q4h    ID:  SBP prophylaxis as above    Hematology:    # Leukocytosis   Likely stress related in context of EGD and initial bleed  - Monitor     Musculoskeletal:  No active issues    Skin:  No active issues     General Cares/Prophylaxis:    DVT Prophylaxis: none due to bleeding  GI Prophylaxis: PPI  Restraints: as needed  Family Communication: updated wife  Code Status: Full    Lines/tubes/drains:  - Peripheral IVx4    Disposition:  - Medical ICU     Patient seen and findings/plan discussed with medical ICU staff, Dr. Perera.    Angelo Lemus MD  Internal Medicine, PGY-1  Pager: [phone]    -----------------------------------------------------------------------    HISTORY PRESENTING ILLNESS:     Federico Bentley is a 56 yo man with past medical history of decompensated cirrhosis c/b esophageal varices, DM2, tobacco use disorder who presented with hematemesis 10/8 evening along with abdominal pain as a transfer from [facility]. At [facility], he received pantoprazole, ceftriaxone, and started on an octreotide drip and given a 1 L fluid bolus due to hypotension that corrected. He was transferred here due to bed availability issues, underwent EGD here, where had bleeding esophageal varices bandedx3. Of note, he was intubated prior to undergoing endoscopy and sedated, though became markedly hypertensive and tachycardic just before intervention with endoscopy, which required holding intervention until blood pressure corrected with increased fentanyl and versed infusions (concern for higher sedation tolerance and anxiety while paralyzed).     He is transferred to the ICU due to need for intubation for airway protection with initial active upper GI variceal bleed, for monitoring after intervention with EGD, where he had  "esophageal varices bandedx3.     Labs are notable for hemoglobin 9.0 (baseline 13-14, admission hemoglobin 10.1, 2 g drop noted at [facility]), INR 1.19, normal bilirubin and LFTs. Prior MRI abdomen 9/5/2020 showed \" mild atrophy and nodularity of the serosal surface,\" thought likely to represent cirrhosis, along with mild ascites. He has a prior history of alcohol use, drinking 4-5 drinks/day 4-5 times/week before having an upper GI bleed in 5/2020, at which point he stopped drinking. Apparently from admission H&P says has not had a drink since Labor Day. During that admission, he had bandingx5 esophageal varices. He had another episode of melena 9/5/2020, where he had banding of EVsx4.     REVIEW OF SYSTEMS: negative other than noted above    PAST MEDICAL HISTORY:   Tobacco use  Alcohol use disorder  Diabetes mellitus     SURGICAL HISTORY:  Colectomy (for diverticular disease)  Appendectomy    SOCIAL HISTORY:  From medicine admission H&P:  \"Lives with wife has 3 children  Denies alcohol use since Labor Day  No recreational drug use  Smokes half a pack cigarettes a day\"    FAMILY HISTORY:   No family history on file.  ALLERGIES:   Allergies   Allergen Reactions     Contrast Dye      MEDICATIONS:  No current facility-administered medications on file prior to encounter.        metFORMIN (GLUCOPHAGE) 1000 MG tablet, Take 1,000 mg by mouth 2 times daily (with meals)       nadolol (CORGARD) 20 MG tablet, Take 20 mg by mouth daily       pantoprazole (PROTONIX) 40 MG EC tablet, Take 40 mg by mouth 2 times daily        PHYSICAL EXAMINATION:  Temp:  [97.6  F (36.4  C)-98.3  F (36.8  C)] 97.6  F (36.4  C)  Pulse:  [] 99  Resp:  [5-38] 15  BP: ()/() 68/52  SpO2:  [95 %-100 %] 98 %  General: intubated, sedated though intermittently agitated with EGD requiring increasing sedation  HEENT: atraumatic  Neuro: sedated, intermittently anxious  Pulm/Resp: Clear breath sounds bilaterally without rhonchi, crackles or " wheeze, breathing non-labored  CV: RRR  Abdomen: Soft, non-distended  Incisions/Skin: no pertinent skin changes. No jauddice visualized.    LABS: Reviewed.   Arterial Blood Gases   No lab results found in last 7 days.  Complete Blood Count   Recent Labs   Lab 10/09/20  1441 10/09/20  1335 10/09/20  0501   WBC  --  17.3* 9.5   HGB 9.0* 8.9* 10.1*   PLT  --  182 114*     Basic Metabolic Panel  Recent Labs   Lab 10/09/20  1335 10/09/20  0501    140   POTASSIUM 4.6 4.5   CHLORIDE 114* 112*   CO2 24 23   BUN 32* 29   CR 0.88 0.89   * 173*     Liver Function Tests  Recent Labs   Lab 10/09/20  1335 10/09/20  0501   AST  --  36   ALT  --  52   ALKPHOS  --  85   BILITOTAL  --  0.4   ALBUMIN  --  2.9*   INR 1.19* 1.15*     Coagulation Profile  Recent Labs   Lab 10/09/20  1335 10/09/20  0501   INR 1.19* 1.15*   PTT 28  --        IMAGING:  No results found for this or any previous visit (from the past 24 hour(s)).

## 2020-10-09 NOTE — CONSULTS
10/9/2020    Closing CD consult as pt is intubated.   Should also be noted that CD consult is for placing people in treatment for BEAN.   Nicotine does not need Rule 25 and placement.  Options would be psych for medications or there is a consult for smoking cessation.     JOHN Tariq  [email or web address]  [phone]

## 2020-10-09 NOTE — PLAN OF CARE
Major Shift Events:  Admitted from OSH around 0345. Awake, oriented, and call light appropriate. No c/o pain. Normal sinus rhythm, BP stable but slightly soft. 1L NS bolus given. Room air, sats stable. No current emesis or nausea, continue to monitor closely for hemoptysis. Voiding spontaneously.   Plan: Possible EGD and varices banding, awaiting GI consult. Continue to monitor while awaiting floor bed.   For vital signs and complete assessments, please see documentation flowsheets.

## 2020-10-09 NOTE — PHARMACY-ADMISSION MEDICATION HISTORY
Admission medication history interview status for the 10/9/2020 admission is complete. See Epic admission navigator for allergy information, pharmacy, prior to admission medications and immunization status.     Medication history interview sources:  EPIC chart review and [facility] Dr. Mccall (Rx fill history website); NO patient/family interview    Changes made to PTA medication list (reason)  Added:  all    Additional medication history information (including reliability of information, actions taken by pharmacist):  - Recent fill for ciprofloxacin 500mg BID infection ppx - 3DS       Prior to Admission medications    Medication Sig Last Dose Taking? Auth Provider   metFORMIN (GLUCOPHAGE) 1000 MG tablet Take 1,000 mg by mouth 2 times daily (with meals)  Yes Unknown, Entered By History   nadolol (CORGARD) 20 MG tablet Take 20 mg by mouth daily  Yes Unknown, Entered By History   pantoprazole (PROTONIX) 40 MG EC tablet Take 40 mg by mouth 2 times daily  Yes Unknown, Entered By History     Medication history completed by: Bryanna Hernandez, PharmD

## 2020-10-09 NOTE — OR NURSING
Patient here for EGD.  Actively bleeding varix noted, procedure aborted, and patient requiring intubation for protection of airway (see anesthesia note).  Patient intubated in procedure room, transported back to , and to have procedure done at bedside.

## 2020-10-10 ENCOUNTER — APPOINTMENT (OUTPATIENT)
Dept: ULTRASOUND IMAGING | Facility: CLINIC | Age: 55
DRG: 441 | End: 2020-10-10
Attending: STUDENT IN AN ORGANIZED HEALTH CARE EDUCATION/TRAINING PROGRAM
Payer: COMMERCIAL

## 2020-10-10 ENCOUNTER — APPOINTMENT (OUTPATIENT)
Dept: GENERAL RADIOLOGY | Facility: CLINIC | Age: 55
DRG: 441 | End: 2020-10-10
Attending: INTERNAL MEDICINE
Payer: COMMERCIAL

## 2020-10-10 LAB
ALBUMIN SERPL-MCNC: 2.6 G/DL (ref 3.4–5)
ALP SERPL-CCNC: 52 U/L (ref 40–150)
ALT SERPL W P-5'-P-CCNC: 34 U/L (ref 0–70)
ANION GAP SERPL CALCULATED.3IONS-SCNC: 7 MMOL/L (ref 3–14)
AST SERPL W P-5'-P-CCNC: 25 U/L (ref 0–45)
BASE DEFICIT BLDV-SCNC: 1.4 MMOL/L
BASOPHILS # BLD AUTO: 0 10E9/L (ref 0–0.2)
BASOPHILS NFR BLD AUTO: 0.3 %
BILIRUB SERPL-MCNC: 0.7 MG/DL (ref 0.2–1.3)
BUN SERPL-MCNC: 34 MG/DL (ref 7–30)
CALCIUM SERPL-MCNC: 7.4 MG/DL (ref 8.5–10.1)
CHLORIDE SERPL-SCNC: 113 MMOL/L (ref 94–109)
CO2 SERPL-SCNC: 22 MMOL/L (ref 20–32)
CREAT SERPL-MCNC: 1 MG/DL (ref 0.66–1.25)
DIFFERENTIAL METHOD BLD: ABNORMAL
EOSINOPHIL # BLD AUTO: 0.2 10E9/L (ref 0–0.7)
EOSINOPHIL NFR BLD AUTO: 2.3 %
ERYTHROCYTE [DISTWIDTH] IN BLOOD BY AUTOMATED COUNT: 15.1 % (ref 10–15)
GFR SERPL CREATININE-BSD FRML MDRD: 85 ML/MIN/{1.73_M2}
GLUCOSE BLDC GLUCOMTR-MCNC: 128 MG/DL (ref 70–99)
GLUCOSE BLDC GLUCOMTR-MCNC: 131 MG/DL (ref 70–99)
GLUCOSE BLDC GLUCOMTR-MCNC: 139 MG/DL (ref 70–99)
GLUCOSE BLDC GLUCOMTR-MCNC: 147 MG/DL (ref 70–99)
GLUCOSE BLDC GLUCOMTR-MCNC: 160 MG/DL (ref 70–99)
GLUCOSE BLDC GLUCOMTR-MCNC: 172 MG/DL (ref 70–99)
GLUCOSE SERPL-MCNC: 134 MG/DL (ref 70–99)
HCO3 BLDV-SCNC: 24 MMOL/L (ref 21–28)
HCT VFR BLD AUTO: 26.5 % (ref 40–53)
HGB BLD-MCNC: 7.7 G/DL (ref 13.3–17.7)
HGB BLD-MCNC: 7.8 G/DL (ref 13.3–17.7)
HGB BLD-MCNC: 8.1 G/DL (ref 13.3–17.7)
HGB BLD-MCNC: 8.4 G/DL (ref 13.3–17.7)
IMM GRANULOCYTES # BLD: 0.1 10E9/L (ref 0–0.4)
IMM GRANULOCYTES NFR BLD: 0.8 %
INR PPP: 1.2 (ref 0.86–1.14)
LYMPHOCYTES # BLD AUTO: 1.8 10E9/L (ref 0.8–5.3)
LYMPHOCYTES NFR BLD AUTO: 19.2 %
MAGNESIUM SERPL-MCNC: 1.6 MG/DL (ref 1.6–2.3)
MCH RBC QN AUTO: 28.8 PG (ref 26.5–33)
MCHC RBC AUTO-ENTMCNC: 30.6 G/DL (ref 31.5–36.5)
MCV RBC AUTO: 94 FL (ref 78–100)
MONOCYTES # BLD AUTO: 1 10E9/L (ref 0–1.3)
MONOCYTES NFR BLD AUTO: 10.2 %
NEUTROPHILS # BLD AUTO: 6.2 10E9/L (ref 1.6–8.3)
NEUTROPHILS NFR BLD AUTO: 67.2 %
NRBC # BLD AUTO: 0 10*3/UL
NRBC BLD AUTO-RTO: 0 /100
O2/TOTAL GAS SETTING VFR VENT: ABNORMAL %
PCO2 BLDV: 39 MM HG (ref 40–50)
PH BLDV: 7.39 PH (ref 7.32–7.43)
PHOSPHATE SERPL-MCNC: 3.2 MG/DL (ref 2.5–4.5)
PLATELET # BLD AUTO: 83 10E9/L (ref 150–450)
PO2 BLDV: 43 MM HG (ref 25–47)
POTASSIUM SERPL-SCNC: 4.1 MMOL/L (ref 3.4–5.3)
PROT SERPL-MCNC: 5.1 G/DL (ref 6.8–8.8)
RBC # BLD AUTO: 2.81 10E12/L (ref 4.4–5.9)
SODIUM SERPL-SCNC: 142 MMOL/L (ref 133–144)
WBC # BLD AUTO: 9.3 10E9/L (ref 4–11)

## 2020-10-10 PROCEDURE — 71045 X-RAY EXAM CHEST 1 VIEW: CPT | Mod: 26 | Performed by: RADIOLOGY

## 2020-10-10 PROCEDURE — 83735 ASSAY OF MAGNESIUM: CPT | Performed by: STUDENT IN AN ORGANIZED HEALTH CARE EDUCATION/TRAINING PROGRAM

## 2020-10-10 PROCEDURE — 93975 VASCULAR STUDY: CPT | Mod: 26 | Performed by: RADIOLOGY

## 2020-10-10 PROCEDURE — 120N000011 HC R&B TRANSPLANT UMMC

## 2020-10-10 PROCEDURE — 250N000009 HC RX 250: Performed by: STUDENT IN AN ORGANIZED HEALTH CARE EDUCATION/TRAINING PROGRAM

## 2020-10-10 PROCEDURE — 999N000157 HC STATISTIC RCP TIME EA 10 MIN

## 2020-10-10 PROCEDURE — 36415 COLL VENOUS BLD VENIPUNCTURE: CPT | Performed by: INTERNAL MEDICINE

## 2020-10-10 PROCEDURE — 250N000011 HC RX IP 250 OP 636: Performed by: STUDENT IN AN ORGANIZED HEALTH CARE EDUCATION/TRAINING PROGRAM

## 2020-10-10 PROCEDURE — 85018 HEMOGLOBIN: CPT | Performed by: INTERNAL MEDICINE

## 2020-10-10 PROCEDURE — 99233 SBSQ HOSP IP/OBS HIGH 50: CPT | Mod: GC | Performed by: INTERNAL MEDICINE

## 2020-10-10 PROCEDURE — 3E033XZ INTRODUCTION OF VASOPRESSOR INTO PERIPHERAL VEIN, PERCUTANEOUS APPROACH: ICD-10-PCS | Performed by: INTERNAL MEDICINE

## 2020-10-10 PROCEDURE — 99233 SBSQ HOSP IP/OBS HIGH 50: CPT | Performed by: INTERNAL MEDICINE

## 2020-10-10 PROCEDURE — 36415 COLL VENOUS BLD VENIPUNCTURE: CPT | Performed by: STUDENT IN AN ORGANIZED HEALTH CARE EDUCATION/TRAINING PROGRAM

## 2020-10-10 PROCEDURE — 258N000003 HC RX IP 258 OP 636: Performed by: STUDENT IN AN ORGANIZED HEALTH CARE EDUCATION/TRAINING PROGRAM

## 2020-10-10 PROCEDURE — 250N000013 HC RX MED GY IP 250 OP 250 PS 637: Performed by: INTERNAL MEDICINE

## 2020-10-10 PROCEDURE — 85025 COMPLETE CBC W/AUTO DIFF WBC: CPT | Performed by: STUDENT IN AN ORGANIZED HEALTH CARE EDUCATION/TRAINING PROGRAM

## 2020-10-10 PROCEDURE — 85018 HEMOGLOBIN: CPT | Performed by: STUDENT IN AN ORGANIZED HEALTH CARE EDUCATION/TRAINING PROGRAM

## 2020-10-10 PROCEDURE — 82803 BLOOD GASES ANY COMBINATION: CPT | Performed by: STUDENT IN AN ORGANIZED HEALTH CARE EDUCATION/TRAINING PROGRAM

## 2020-10-10 PROCEDURE — 85610 PROTHROMBIN TIME: CPT | Performed by: STUDENT IN AN ORGANIZED HEALTH CARE EDUCATION/TRAINING PROGRAM

## 2020-10-10 PROCEDURE — 999N001017 HC STATISTIC GLUCOSE BY METER IP

## 2020-10-10 PROCEDURE — 71045 X-RAY EXAM CHEST 1 VIEW: CPT

## 2020-10-10 PROCEDURE — 94003 VENT MGMT INPAT SUBQ DAY: CPT

## 2020-10-10 PROCEDURE — C9113 INJ PANTOPRAZOLE SODIUM, VIA: HCPCS | Performed by: STUDENT IN AN ORGANIZED HEALTH CARE EDUCATION/TRAINING PROGRAM

## 2020-10-10 PROCEDURE — 93975 VASCULAR STUDY: CPT

## 2020-10-10 PROCEDURE — 84100 ASSAY OF PHOSPHORUS: CPT | Performed by: STUDENT IN AN ORGANIZED HEALTH CARE EDUCATION/TRAINING PROGRAM

## 2020-10-10 PROCEDURE — 80053 COMPREHEN METABOLIC PANEL: CPT | Performed by: STUDENT IN AN ORGANIZED HEALTH CARE EDUCATION/TRAINING PROGRAM

## 2020-10-10 RX ORDER — MAGNESIUM SULFATE HEPTAHYDRATE 40 MG/ML
2 INJECTION, SOLUTION INTRAVENOUS DAILY PRN
Status: DISCONTINUED | OUTPATIENT
Start: 2020-10-10 | End: 2020-10-12 | Stop reason: HOSPADM

## 2020-10-10 RX ORDER — DIPHENHYDRAMINE HCL 12.5MG/5ML
25 LIQUID (ML) ORAL ONCE
Status: COMPLETED | OUTPATIENT
Start: 2020-10-10 | End: 2020-10-10

## 2020-10-10 RX ORDER — MAGNESIUM SULFATE HEPTAHYDRATE 40 MG/ML
4 INJECTION, SOLUTION INTRAVENOUS EVERY 4 HOURS PRN
Status: DISCONTINUED | OUTPATIENT
Start: 2020-10-10 | End: 2020-10-12 | Stop reason: HOSPADM

## 2020-10-10 RX ADMIN — MIDAZOLAM 2 MG: 1 INJECTION INTRAMUSCULAR; INTRAVENOUS at 01:39

## 2020-10-10 RX ADMIN — MIDAZOLAM 2 MG: 1 INJECTION INTRAMUSCULAR; INTRAVENOUS at 06:54

## 2020-10-10 RX ADMIN — MIDAZOLAM 2 MG: 1 INJECTION INTRAMUSCULAR; INTRAVENOUS at 06:10

## 2020-10-10 RX ADMIN — MIDAZOLAM 2 MG: 1 INJECTION INTRAMUSCULAR; INTRAVENOUS at 03:03

## 2020-10-10 RX ADMIN — CEFTRIAXONE 1 G: 1 INJECTION, POWDER, FOR SOLUTION INTRAMUSCULAR; INTRAVENOUS at 00:21

## 2020-10-10 RX ADMIN — INSULIN ASPART 1 UNITS: 100 INJECTION, SOLUTION INTRAVENOUS; SUBCUTANEOUS at 08:00

## 2020-10-10 RX ADMIN — MIDAZOLAM 2 MG: 1 INJECTION INTRAMUSCULAR; INTRAVENOUS at 00:07

## 2020-10-10 RX ADMIN — OCTREOTIDE ACETATE 50 MCG/HR: 200 INJECTION, SOLUTION INTRAVENOUS; SUBCUTANEOUS at 01:39

## 2020-10-10 RX ADMIN — PANTOPRAZOLE SODIUM 40 MG: 40 INJECTION, POWDER, FOR SOLUTION INTRAVENOUS at 07:53

## 2020-10-10 RX ADMIN — DEXMEDETOMIDINE 0.6 MCG/KG/HR: 100 INJECTION, SOLUTION, CONCENTRATE INTRAVENOUS at 01:50

## 2020-10-10 RX ADMIN — Medication 50 MCG: at 00:20

## 2020-10-10 RX ADMIN — MAGNESIUM SULFATE 2 G: 2 INJECTION INTRAVENOUS at 05:40

## 2020-10-10 RX ADMIN — PANTOPRAZOLE SODIUM 40 MG: 40 INJECTION, POWDER, FOR SOLUTION INTRAVENOUS at 20:22

## 2020-10-10 RX ADMIN — CEFTRIAXONE 1 G: 1 INJECTION, POWDER, FOR SOLUTION INTRAMUSCULAR; INTRAVENOUS at 22:21

## 2020-10-10 RX ADMIN — INSULIN ASPART 1 UNITS: 100 INJECTION, SOLUTION INTRAVENOUS; SUBCUTANEOUS at 12:06

## 2020-10-10 RX ADMIN — MIDAZOLAM 2 MG: 1 INJECTION INTRAMUSCULAR; INTRAVENOUS at 04:28

## 2020-10-10 RX ADMIN — NOREPINEPHRINE BITARTRATE 0.03 MCG/KG/MIN: 1 INJECTION, SOLUTION, CONCENTRATE INTRAVENOUS at 00:32

## 2020-10-10 RX ADMIN — INSULIN ASPART 1 UNITS: 100 INJECTION, SOLUTION INTRAVENOUS; SUBCUTANEOUS at 20:24

## 2020-10-10 RX ADMIN — DIPHENHYDRAMINE HYDROCHLORIDE 25 MG: 25 SOLUTION ORAL at 14:41

## 2020-10-10 ASSESSMENT — ACTIVITIES OF DAILY LIVING (ADL)
ADLS_ACUITY_SCORE: 21
ADLS_ACUITY_SCORE: 20
ADLS_ACUITY_SCORE: 21

## 2020-10-10 ASSESSMENT — MIFFLIN-ST. JEOR
SCORE: 1714.06
SCORE: 1746

## 2020-10-10 NOTE — PROGRESS NOTES
Admitted/transferred from:   Time of arrival on unit 0297  2 RN  skin assessment completed by Tristen Fisher  Skin assessment finding: intact  Interventions/actions: no action required    Will continue to monitor.

## 2020-10-10 NOTE — PROGRESS NOTES
[facility], [place]  Procedure Note          Extubation:       Federico Bentley  MRN# [id number]   October 10, 2020, 1:32 PM         Patient extubated at: October 10, 2020, 1:00 PM   Supplemental Oxygen: Via nasal cannula at 4 liters per minute   Cough: The cough is good and productive   Secretion Mode: Able to clear   Secretion Amount: Small amount, moderately thick and white / yellow in color   Respiratory Exam:: Breath sounds: equal and clear     Location: bilaterally   Skin Exam:: Patient color: natural   Patient Status: Currently appears comfortable             Recorded by Treva Mesa

## 2020-10-10 NOTE — PROGRESS NOTES
[facility]    Hepatology Follow-up    CC: Hematemesis    Dx: Alcohol related liver disease   Portal hypertension   Acute esophageal variceal bleed s/p banding x4 (10/9/20)    24 hour events: EGD 10/9 with successful banding x4     Hemoglobin stabe ~ 8     Subjective:    Patient intubated in the ICU following commands. No further episodes of hematemesis, no BM as of this morning.    Medications  Current Facility-Administered Medications   Medication Dose Route Frequency     cefTRIAXone  1 g Intravenous Q24H     [START ON 10/10/2020] influenza recomb quadrivalent PF  0.5 mL Intramuscular Prior to discharge     insulin aspart  1-4 Units Subcutaneous Q4H     metoclopramide  10 mg Intravenous Once     pantoprazole (PROTONIX) IV  40 mg Intravenous BID     sodium chloride (PF)  3 mL Intracatheter Q8H       Review of systems  A 10-point review of systems was negative, unless stated above    Examination  BP (!) 70/47   Pulse 69   Temp 98.7  F (37.1  C) (Oral)   Resp 13   Ht 1.829 m (6')   Wt 82.9 kg (182 lb 12.2 oz)   SpO2 100%   BMI 24.79 kg/m      Intake/Output Summary (Last 24 hours) at 10/9/2020 2032  Last data filed at 10/9/2020 2000  Gross per 24 hour   Intake 4871.6 ml   Output 250 ml   Net 4621.6 ml     General: Looks well, NAD, normal color  CVS: No edema.  Resp: ETT  Abdomen: Moderately distended, soft, NT  Extremities: Moves all four extremities equally  Neuro: Alert, responds appropriately to questions  Skin: no rash    Laboratory  Lab Results   Component Value Date     10/09/2020    POTASSIUM 4.6 10/09/2020    CHLORIDE 114 10/09/2020    CO2 24 10/09/2020    BUN 32 10/09/2020    CR 0.88 10/09/2020       Lab Results   Component Value Date    BILITOTAL 0.4 10/09/2020    ALT 52 10/09/2020    AST 36 10/09/2020    ALKPHOS 85 10/09/2020       Lab Results   Component Value Date    WBC 17.3 10/09/2020    HGB 9.0 10/09/2020    MCV 94 10/09/2020     10/09/2020       Lab  Results   Component Value Date    INR 1.19 10/09/2020       MELD-Na score: 8 at 10/9/2020  1:35 PM  MELD score: 8 at 10/9/2020  1:35 PM  Calculated from:  Serum Creatinine: 0.88 mg/dL (Rounded to 1 mg/dL) at 10/9/2020  1:35 PM  Serum Sodium: 143 mmol/L (Rounded to 137 mmol/L) at 10/9/2020  1:35 PM  Total Bilirubin: 0.4 mg/dL (Rounded to 1 mg/dL) at 10/9/2020  5:01 AM  INR(ratio): 1.19 at 10/9/2020  1:35 PM  Age: 55 years 4 months       Radiology      Assessment  55 year old male with a history of alcohol related liver disease, esophageal variceal bleeding s/p banding x2, who is admitted with an acute variceal bleed.    Patient is status post EGD with successful banding, since remains hemodynamically stable without recurrent overt bleeding.  Hemoglobin also stable around 8 today.  ICU plans to extubate and transfer from floor if doing well.  Plan to continue to monitor closely, continue IV octreotide and PPI for 72 hours post intervention.     Recommendations:  -- Continue IV octreotide for 72 hours post-intervention (through 10/12 evening)  -- Continue IV ceftriaxone for SBP prophylaxis to complete a 7 day course (can transition to PO ciprofloxacin when discharging)  -- Continue IV PPI BID  -- Diet: CLD  -- Monitor for recurrent hematemesis or hematochezia, would anticipate melena for up to 48-72 hours post-intervention  -- Trend hemoglobin, transfuse <7 from GI standpoint  -- Repeat EGD in 4 weeks for re-treatment    Patient seen and discussed with Dr. Leon.    Kristel Byrd MD  GI Fellow, PGY-6  P: [phone]    Attestation:  This patient has been seen and evaluated by me, Daniela Leon.  Discussed with the house staff team or resident(s) and agree with the findings and plan in this note.

## 2020-10-10 NOTE — PROGRESS NOTES
MEDICAL ICU PROGRESS NOTE  10/10/2020      Date of Service (when I saw the patient): 10/10/2020    ASSESSMENT: Federico Bentley is a 55 year old male with PMH suspected cirrhosis c/b esophageal varices, diabetes mellitus, and smoking who was admitted on 10/9/2020 for hematemesis found secondary to bleeding esophageal varices. EGD performed 10/9 and required banding x3 for large bleeding esophageal varices.  He is admitted to the ICU for intubation due to airway protection in context of initial hematemesis.     CHANGES and MAJOR THINGS TODAY:   -Discontinue fentanyl  -Discontinue precedex  -PST 7/5/25% with plan to likely extubate  -Hgb checks q 6 hrs    PLAN:     Neuro:  #Acute Pain and sedation  -Discontinue fentanyl gtt and boluses  -Wean off precedex gtt  -Discontinue PRN versed   -Tylenol PRN    #Hx alcohol use  #Low concern for alcohol withdrawal  Last drink per patient labor day. Can monitor for signs of withdrawal, low concern given timing.     Pulmonary:  #Intubation for airway protection  Initial active GI bleed found secondary to bleeding esophageal varices. Intubated for EGD due to concern for aspiration of large volume hematemesis. Kept intubated while monitoring for rebleeding and airway protection.  -CMV: 12/500/5/25%.  SpO2 96%, RR 18, Vt 600s (RSBI 30).  -PST 7/5/25% with plan for likely extubation.    Cardiovascular:  #Hypotension due to acute blood loss   Hypotension due to blood loss with previous bleeding esophageal varices now s/p banding.   - Softer pressures overnight requiring additional 1 L LR + 500 ml 5% alb and briefly on norepi gtt (about 30 minutes) but has been off since 01:00.  -MAPs remain > goal 60     GI/Nutrition:  #Upper GI Bleed 2/2 bleeding esophageal varices  Acute blood loss secondary to what was found to be bleeding esophageal varices, secondary to portal hypertension. Now s/p banding x3.  - Avoid NG/OG placement  - NPO.  Diet adv to 2 gm Na+ once extubated.  - IV PPI BID  -  Continue octreotide for 72 hours   - Ceftriaxone 5-7 days for SBP prophylaxis (ascites, current GI bleed)  - Repeat EGD in 4 weeks for variceal treatment      #Hx decompensated cirrhosis c/b esophageal varices  Of note, diagnosis of cirrhosis via labs and imaging is a bit unclear, with nodular contour of liver, though without significant synthetic dysfunction or bilirubinemia. Raises question of whether portal hypertension and varices is secondary to non cirrhotic causes.    -- No hx of HE, HRS, HPS  -- EV/GV: Last EGD 9/5/2020 with 4 EVs banded  -- Ascites/SBP: perihepatic ascites on MR abdomen 9/2020  -- Hepatitis viruses: HepA/B/C nonimmune  -- Coagulopathy: INR 1.2, platelets 83  -- MELD-Na score: 8 at 10/10/2020  3:07 AM  MELD score: 8 at 10/10/2020  3:07 AM  Calculated from:  Serum Creatinine: 1.00 mg/dL at 10/10/2020  3:07 AM  Serum Sodium: 142 mmol/L (Rounded to 137 mmol/L) at 10/10/2020  3:07 AM  Total Bilirubin: 0.7 mg/dL (Rounded to 1 mg/dL) at 10/10/2020  3:07 AM  INR(ratio): 1.20 at 10/10/2020  3:07 AM  Age: 55 years 4 months      Renal/Fluids/Electrolytes:  No active issues  -BUN/Cr and lytes all WNL  -Condom cath     Endocrine:  #TIIDM  Last A1c 7.9%, Glu 128-160 (while NPO)  - Continue medium sliding scale insulin  - Glucose checks q4h     ID:  SBP prophylaxis as above     Hematology:    #Leukocytosis - resolved  - WBC 9.3 (17.3)  - Likely stress related in context of EGD and initial bleed    #Anemia due to acute blood loss in the setting of bleeding esophageal varices  -Hgb 8.1 < 8.4 < 9  -Received 2 units PRBCs (last unit ~16:00)  -Monitoring Hgb q 6 hrs.    -Transfuse for Hgb <7    Musculoskeletal:  No active issues     Skin:  No active issues      General Cares/Prophylaxis:    DVT Prophylaxis: none due to bleeding  GI Prophylaxis: PPI   Restraints: as needed  Family Communication: updated wife at bedside  Code Status: Full     Lines/tubes/drains:  - ETT  - Peripheral IVx4  - Condom  cath     Disposition:  - Medical ICU      Patient seen and findings/plan discussed with medical ICU staff, Dr. Perlman.    Trupti Valentino NP-C    ====================================  INTERVAL HISTORY:   Overnight had softer pressures requiring an additional 1 liter LR (received total of 2 L on 10/9), 500 ml 5% alb and briefly required norepi gtt (~0030) but off since ~0100.  Patient has been restless/agitated requiring frequent PRN versed doses and frustrated with intubation.  Patient remains easily arousable this morning on precedex and fentanyl and continues to indicate desire for ETT removal.    OBJECTIVE:   1. VITAL SIGNS:   Temp:  [97.6  F (36.4  C)-99.9  F (37.7  C)] 99.9  F (37.7  C)  Pulse:  [] 76  Resp:  [5-38] 15  BP: ()/() 96/57  FiO2 (%):  [25 %-50 %] 25 %  SpO2:  [95 %-100 %] 100 %  Ventilation Mode: (S) CPAP/PS  (Continuous positive airway pressure with Pressure Support)  FiO2 (%): 25 %  Rate Set (breaths/minute): 12 breaths/min  Tidal Volume Set (mL): 500 mL  PEEP (cm H2O): 5 cmH2O  Pressure Support (cm H2O): 7 cmH2O  Oxygen Concentration (%): 25 %  Resp: 15    2. INTAKE/ OUTPUT:   I/O last 3 completed shifts:  In: 4757.69 [I.V.:1657.69; IV Piggyback:2000]  Out: 550 [Urine:550]    3. PHYSICAL EXAMINATION:  General/Neuro: Resting comfortably in bed, arousable to voice, follows commands/lifts head off pillow, appropriately nods to questions, moves all extremities.  HEENT: atraumatic, PEERL, pupils 3 mm, anicteric, moist mucous membranes.  Pulm/Resp: ETT in place on CMV, lungs CTA bilaterally without rhonchi, crackles or wheeze, breathing non-labored  CV: RRR, S1 and S2, no MRG  Abdomen: Soft, non-distended, non-tender.  : condom cather in place, urine yellow and clear  Incisions/Skin: no erythema, rashes    4. LABS:   Arterial Blood Gases   No lab results found in last 7 days.  Complete Blood Count   Recent Labs   Lab 10/10/20  0724 10/10/20  0307 10/09/20  2058 10/09/20  1441  10/09/20  1335 10/09/20  0501   WBC  --  9.3  --   --  17.3* 9.5   HGB 7.8* 8.1* 8.4* 9.0* 8.9* 10.1*   PLT  --  83*  --   --  182 114*     Basic Metabolic Panel  Recent Labs   Lab 10/10/20  0307 10/09/20  1335 10/09/20  0501    143 140   POTASSIUM 4.1 4.6 4.5   CHLORIDE 113* 114* 112*   CO2 22 24 23   BUN 34* 32* 29   CR 1.00 0.88 0.89   * 192* 173*     Liver Function Tests  Recent Labs   Lab 10/10/20  0307 10/09/20  1335 10/09/20  0501   AST 25  --  36   ALT 34  --  52   ALKPHOS 52  --  85   BILITOTAL 0.7  --  0.4   ALBUMIN 2.6*  --  2.9*   INR 1.20* 1.19* 1.15*     Coagulation Profile  Recent Labs   Lab 10/10/20  0307 10/09/20  1335 10/09/20  0501   INR 1.20* 1.19* 1.15*   PTT  --  28  --        5. RADIOLOGY:   Recent Results (from the past 24 hour(s))   XR Chest Port 1 View    Narrative    EXAMINATION:  XR CHEST PORT 1 VW 10/9/2020 6:36 PM.    COMPARISON: None..    HISTORY:  ETT assessment    FINDINGS: Portable AP view of the chest. Endotracheal tube tip  projects over the mid trachea. The trachea is midline. The  cardiomediastinal silhouette is within normal limits. Pulmonary  vasculature is distinct. Borderline low lung volumes. Interstitial  prominence of bilateral lung fields. No pleural effusion or  pneumothorax. The upper abdomen is unremarkable.      Impression    IMPRESSION:   1. Endotracheal tube tip projects over the mid trachea.  2. Interstitial airspace opacity.    I have personally reviewed the examination and initial interpretation  and I agree with the findings.    SILVIA FOSTER MD   XR Chest Port 1 View    Narrative    EXAM: XR CHEST PORT 1 VW  10/10/2020 6:58 AM     HISTORY:  ICU monitoring       COMPARISON:  Chest x-ray 10/9/2020.    FINDINGS: AP radiograph of the chest. Endotracheal tube projecting  over the midthoracic trachea. Heart size is normal. No pneumothorax or  pleural effusion. Lower lung fields predominantly interstitial  opacities. The visualized upper abdomen is  unremarkable. No acute  osseous abnormality.      Impression    IMPRESSION:  1. Endotracheal tube projecting over the midthoracic trachea.  2. Unchanged interstitial opacities in the lower lung fields.    I have personally reviewed the examination and initial interpretation  and I agree with the findings.    SESAR CUEVAS MD

## 2020-10-10 NOTE — PLAN OF CARE
ICU End of Shift Summary. See flowsheets for vital signs and detailed assessment.    Changes this shift: Pt intermittently very restless/agitated overnight, requiring a large amount of prn versed; dex running at 0.6 and fentanyl running at 50. Follows commands, appears intact, very frustrated about remaining intubated. No reports of pain. SR; hypotensive overnight. 2 L of LR given as well as 500mL albumin with no improvement in MAPs; levophed started peripherally, but was able to be turned off shortly after being started. Minimal vent settings, very strong cough, minimal secretions. No bowel movement, NPO overnight. Bladder scanned for ~600, pt had large incontinent episode shortly after; condom cath placed with additional urine out. Mag replaced. Hgb stable at 8.1.    Plan:  Continue to trend Hgb and monitor for bleeding. Extubate if able. Update team of changes.       Problem: Bleeding (Gastrointestinal Bleeding)  Goal: Hemostasis  Outcome: No Change     Problem: Adjustment to Illness (Gastrointestinal Bleeding)  Goal: Optimal Coping with Acute Illness  Outcome: No Change

## 2020-10-10 NOTE — PROGRESS NOTES
SUBJECTIVE    Federico Bentley is a 55 year old male with PMH suspected cirrhosis c/b esophageal varices, diabetes mellitus, and smoking who was admitted on 10/9/2020 for hematemesis found to be secondary to bleeding esophageal varices. EGD performed 10/9 resulted in banding x3 for large bleeding esophageal varices.  He was admitted to the ICU for intubation for airway protection in the context of his initial hematemesis. He was monitored overnight 10/9-10/10 and was stable hemodynamically with no signs of rebleeding. He was extubated on 10/10 and is now being transferred to the floor.    OBJECTIVE  Vitals over last 24 hours:   TMax  99.9   HR 66-94   RR 9-18   SBP    DBP 45-82   SpO2 %    Lab Results   Component Value Date    WBC 9.3 10/10/2020    HGB 8.4 (L) 10/10/2020    HCT 26.5 (L) 10/10/2020    PLT 83 (L) 10/10/2020     10/10/2020    POTASSIUM 4.1 10/10/2020    CHLORIDE 113 (H) 10/10/2020    CO2 22 10/10/2020    BUN 34 (H) 10/10/2020    CR 1.00 10/10/2020     (H) 10/10/2020    AST 25 10/10/2020    ALT 34 10/10/2020    ALKPHOS 52 10/10/2020    BILITOTAL 0.7 10/10/2020    INR 1.20 (H) 10/10/2020     Noted that an ultrasound abdomen was performed earlier this morning, but has not been read yet.    Physical exam normal at this time. Normal bowel sounds, no tenderness/guarding/rebound.    ASSESSMENT AND PLAN    # Hx of alcohol use  Last drink September 6, 2020. No signs of withdrawal during admission here.    # Previous intubation for airway protection  Patient never had respiratory distress; intubation was purely for airway protection in setting of hematemesis. He was kept intubated while monitoring for any signs of rebleeding after his EGD with banding of varices.    # Upper GIB 2/2 esophageal varices  # Anemia  EGD with banding was on 10/9. He has received 2 U PRBCs total (last was @ 1600 on 10/9).  - Trend hemoglobin q6hrs; transfuse if Hgb <7  - Avoid NG/OG placement  - Clear liquid diet  per GI recommendations  - IV PPI BID  - Octreotide gtt for 72 hours total (stop date in order already)  - Ceftriaxone 1g q24hrs for 7 days total for SBP ppx  - Discharge planning: will need EGD in 4 weeks for variceal treatment    # Hx of decompensated cirrhosis  -- No hx of HE, HRS, HPS  -- EV/GV: Last EGD 9/5/2020 with 4 EVs banded  -- Ascites/SBP: perihepatic ascites on MR abdomen 9/2020  -- Hepatitis viruses: HepA/B/C nonimmune  -- Coagulopathy: INR 1.2, platelets 83  MELD-Na score: 8 at 10/10/2020  3:07 AM  MELD score: 8 at 10/10/2020  3:07 AM  Calculated from:  Serum Creatinine: 1.00 mg/dL at 10/10/2020  3:07 AM  Serum Sodium: 142 mmol/L (Rounded to 137 mmol/L) at 10/10/2020  3:07 AM  Total Bilirubin: 0.7 mg/dL (Rounded to 1 mg/dL) at 10/10/2020  3:07 AM  INR(ratio): 1.20 at 10/10/2020  3:07 AM  Age: 55 years 4 months     # Type 2 diabetes mellitus  - Hold home metformin  - Medium sliding scale insulin  - Glucose checks q4hrs    DVT ppx: none because of bleeding; ambulate as able  GI ppx: IV PPI BID  FULL CODE

## 2020-10-10 NOTE — PLAN OF CARE
ICU End of Shift Summary. See flowsheets for vital signs and detailed assessment.    Changes this shift: Intubated in endoscopy suite at start of EGD.  Banded varices x3.  2U PRBCs given total.  Q6h hgb checks.  Intermittent agitation, on precedex at 0.5, fentanyl at 50, frequent doses of PRN versed.  Follows commands, asking for ETT out.  SBP 70-90s, MAPs 50-60s.  IL LR given for MAP 57.  MAP goal 60.   Maintaining sat high 90s on 30% 5 peep.  Moderate amount of bloody secretions via ETT.  Brown BM x2 this AM prior to scope, no BM after scope.  Due to void, bladder scan 284, plan to straight cath if >500.  Wife at bedside and updated.      Plan: Continue to monitor.

## 2020-10-11 LAB
ALBUMIN SERPL-MCNC: 3 G/DL (ref 3.4–5)
ALP SERPL-CCNC: 54 U/L (ref 40–150)
ALT SERPL W P-5'-P-CCNC: 38 U/L (ref 0–70)
ANION GAP SERPL CALCULATED.3IONS-SCNC: 7 MMOL/L (ref 3–14)
AST SERPL W P-5'-P-CCNC: 39 U/L (ref 0–45)
BASOPHILS # BLD AUTO: 0 10E9/L (ref 0–0.2)
BASOPHILS NFR BLD AUTO: 0.4 %
BILIRUB SERPL-MCNC: 0.8 MG/DL (ref 0.2–1.3)
BUN SERPL-MCNC: 20 MG/DL (ref 7–30)
CALCIUM SERPL-MCNC: 7.8 MG/DL (ref 8.5–10.1)
CHLORIDE SERPL-SCNC: 111 MMOL/L (ref 94–109)
CO2 SERPL-SCNC: 24 MMOL/L (ref 20–32)
CREAT SERPL-MCNC: 0.82 MG/DL (ref 0.66–1.25)
DIFFERENTIAL METHOD BLD: ABNORMAL
EOSINOPHIL # BLD AUTO: 0.3 10E9/L (ref 0–0.7)
EOSINOPHIL NFR BLD AUTO: 3 %
ERYTHROCYTE [DISTWIDTH] IN BLOOD BY AUTOMATED COUNT: 15 % (ref 10–15)
GFR SERPL CREATININE-BSD FRML MDRD: >90 ML/MIN/{1.73_M2}
GLUCOSE BLDC GLUCOMTR-MCNC: 101 MG/DL (ref 70–99)
GLUCOSE BLDC GLUCOMTR-MCNC: 117 MG/DL (ref 70–99)
GLUCOSE BLDC GLUCOMTR-MCNC: 118 MG/DL (ref 70–99)
GLUCOSE BLDC GLUCOMTR-MCNC: 118 MG/DL (ref 70–99)
GLUCOSE BLDC GLUCOMTR-MCNC: 130 MG/DL (ref 70–99)
GLUCOSE BLDC GLUCOMTR-MCNC: 132 MG/DL (ref 70–99)
GLUCOSE BLDC GLUCOMTR-MCNC: 161 MG/DL (ref 70–99)
GLUCOSE SERPL-MCNC: 135 MG/DL (ref 70–99)
HCT VFR BLD AUTO: 26 % (ref 40–53)
HGB BLD-MCNC: 7.9 G/DL (ref 13.3–17.7)
HGB BLD-MCNC: 8 G/DL (ref 13.3–17.7)
HGB BLD-MCNC: 8 G/DL (ref 13.3–17.7)
HGB BLD-MCNC: 8.1 G/DL (ref 13.3–17.7)
IMM GRANULOCYTES # BLD: 0.1 10E9/L (ref 0–0.4)
IMM GRANULOCYTES NFR BLD: 0.7 %
INR PPP: 1.16 (ref 0.86–1.14)
LYMPHOCYTES # BLD AUTO: 1.3 10E9/L (ref 0.8–5.3)
LYMPHOCYTES NFR BLD AUTO: 14.1 %
MCH RBC QN AUTO: 29.2 PG (ref 26.5–33)
MCHC RBC AUTO-ENTMCNC: 30.8 G/DL (ref 31.5–36.5)
MCV RBC AUTO: 95 FL (ref 78–100)
MONOCYTES # BLD AUTO: 0.8 10E9/L (ref 0–1.3)
MONOCYTES NFR BLD AUTO: 8.7 %
NEUTROPHILS # BLD AUTO: 6.5 10E9/L (ref 1.6–8.3)
NEUTROPHILS NFR BLD AUTO: 73.1 %
NRBC # BLD AUTO: 0 10*3/UL
NRBC BLD AUTO-RTO: 0 /100
PHOSPHATE SERPL-MCNC: 2.4 MG/DL (ref 2.5–4.5)
PLATELET # BLD AUTO: 73 10E9/L (ref 150–450)
POTASSIUM SERPL-SCNC: 3.8 MMOL/L (ref 3.4–5.3)
PROT SERPL-MCNC: 6 G/DL (ref 6.8–8.8)
RBC # BLD AUTO: 2.74 10E12/L (ref 4.4–5.9)
SODIUM SERPL-SCNC: 143 MMOL/L (ref 133–144)
WBC # BLD AUTO: 8.9 10E9/L (ref 4–11)

## 2020-10-11 PROCEDURE — 250N000013 HC RX MED GY IP 250 OP 250 PS 637: Performed by: STUDENT IN AN ORGANIZED HEALTH CARE EDUCATION/TRAINING PROGRAM

## 2020-10-11 PROCEDURE — 85018 HEMOGLOBIN: CPT | Performed by: INTERNAL MEDICINE

## 2020-10-11 PROCEDURE — 80053 COMPREHEN METABOLIC PANEL: CPT | Performed by: STUDENT IN AN ORGANIZED HEALTH CARE EDUCATION/TRAINING PROGRAM

## 2020-10-11 PROCEDURE — 99232 SBSQ HOSP IP/OBS MODERATE 35: CPT | Performed by: INTERNAL MEDICINE

## 2020-10-11 PROCEDURE — C9113 INJ PANTOPRAZOLE SODIUM, VIA: HCPCS | Performed by: STUDENT IN AN ORGANIZED HEALTH CARE EDUCATION/TRAINING PROGRAM

## 2020-10-11 PROCEDURE — 250N000011 HC RX IP 250 OP 636: Performed by: STUDENT IN AN ORGANIZED HEALTH CARE EDUCATION/TRAINING PROGRAM

## 2020-10-11 PROCEDURE — 258N000003 HC RX IP 258 OP 636: Performed by: STUDENT IN AN ORGANIZED HEALTH CARE EDUCATION/TRAINING PROGRAM

## 2020-10-11 PROCEDURE — 36415 COLL VENOUS BLD VENIPUNCTURE: CPT | Performed by: STUDENT IN AN ORGANIZED HEALTH CARE EDUCATION/TRAINING PROGRAM

## 2020-10-11 PROCEDURE — 36415 COLL VENOUS BLD VENIPUNCTURE: CPT | Performed by: INTERNAL MEDICINE

## 2020-10-11 PROCEDURE — 84100 ASSAY OF PHOSPHORUS: CPT | Performed by: STUDENT IN AN ORGANIZED HEALTH CARE EDUCATION/TRAINING PROGRAM

## 2020-10-11 PROCEDURE — 85610 PROTHROMBIN TIME: CPT | Performed by: STUDENT IN AN ORGANIZED HEALTH CARE EDUCATION/TRAINING PROGRAM

## 2020-10-11 PROCEDURE — 85018 HEMOGLOBIN: CPT | Performed by: STUDENT IN AN ORGANIZED HEALTH CARE EDUCATION/TRAINING PROGRAM

## 2020-10-11 PROCEDURE — 120N000011 HC R&B TRANSPLANT UMMC

## 2020-10-11 PROCEDURE — 85025 COMPLETE CBC W/AUTO DIFF WBC: CPT | Performed by: STUDENT IN AN ORGANIZED HEALTH CARE EDUCATION/TRAINING PROGRAM

## 2020-10-11 PROCEDURE — 999N001017 HC STATISTIC GLUCOSE BY METER IP

## 2020-10-11 RX ORDER — SIMETHICONE 80 MG
80 TABLET,CHEWABLE ORAL EVERY 6 HOURS PRN
Status: DISCONTINUED | OUTPATIENT
Start: 2020-10-11 | End: 2020-10-12 | Stop reason: HOSPADM

## 2020-10-11 RX ORDER — ACETAMINOPHEN 325 MG/1
325 TABLET ORAL EVERY 4 HOURS PRN
Status: DISCONTINUED | OUTPATIENT
Start: 2020-10-11 | End: 2020-10-12 | Stop reason: HOSPADM

## 2020-10-11 RX ADMIN — INSULIN ASPART 1 UNITS: 100 INJECTION, SOLUTION INTRAVENOUS; SUBCUTANEOUS at 12:22

## 2020-10-11 RX ADMIN — PANTOPRAZOLE SODIUM 40 MG: 40 INJECTION, POWDER, FOR SOLUTION INTRAVENOUS at 21:03

## 2020-10-11 RX ADMIN — CEFTRIAXONE 1 G: 1 INJECTION, POWDER, FOR SOLUTION INTRAMUSCULAR; INTRAVENOUS at 22:15

## 2020-10-11 RX ADMIN — OCTREOTIDE ACETATE 50 MCG/HR: 200 INJECTION, SOLUTION INTRAVENOUS; SUBCUTANEOUS at 01:45

## 2020-10-11 RX ADMIN — ACETAMINOPHEN 325 MG: 325 TABLET, FILM COATED ORAL at 08:40

## 2020-10-11 RX ADMIN — PANTOPRAZOLE SODIUM 40 MG: 40 INJECTION, POWDER, FOR SOLUTION INTRAVENOUS at 08:41

## 2020-10-11 RX ADMIN — ACETAMINOPHEN 325 MG: 325 TABLET, FILM COATED ORAL at 01:39

## 2020-10-11 ASSESSMENT — ACTIVITIES OF DAILY LIVING (ADL)
ADLS_ACUITY_SCORE: 15
ADLS_ACUITY_SCORE: 20
ADLS_ACUITY_SCORE: 15
ADLS_ACUITY_SCORE: 19
ADLS_ACUITY_SCORE: 20
ADLS_ACUITY_SCORE: 15

## 2020-10-11 ASSESSMENT — MIFFLIN-ST. JEOR: SCORE: 1690.17

## 2020-10-11 NOTE — PLAN OF CARE
"0700-1930  /76 (BP Location: Right arm)   Pulse 67   Temp 97.3  F (36.3  C) (Oral)   Resp 16   Ht 1.816 m (5' 11.5\")   Wt 81.5 kg (179 lb 11.2 oz)   SpO2 98%   BMI 24.71 kg/m    Octreotide gtt continues at 10ml/hour. Protonix IV BID. Has had two small soft brown/red stools today. Hgb stable at 8.1, plan for recheck at 1800. Did c/o some abdominal cramping but has improved with increased activity and ambulation. Diet advanced to moderate cons CHO, attempting it tonight for dinner. , 117. Will continue the octreotide through tomorrow.   "

## 2020-10-11 NOTE — PROGRESS NOTES
[facility]     Medicine Progress Note - Hospitalist Service, Gold 11       Date of Admission:  10/9/2020  Assessment & Plan        Federico Bentley is a 55 year old male with PMH suspected cirrhosis c/b esophageal varices, diabetes mellitus, and smoking who was admitted on 10/9/2020 for hematemesis found secondary to bleeding esophageal varices. EGD performed 10/9 and required banding x3 for large bleeding esophageal varices.  He was admitted to the ICU for intubation due to airway protection in context of initial hematemesis. His esophageal varices have been banded, and his hgb has remained stable.    Upper GIB from esophageal varices in the context of portal htn  Acute blood loss anemia  Hypotension from acute blood loss- now resolved  Intubated for airway protection due to bleeding esophageal varices. EGD with banding on 10/9. He has received 2 U PRBCs total (last was @ 1600 on 10/9). He was briefly on a norepi drip and had fluid rehydration in the MICU  - Trend hemoglobin q6hrs; transfuse if Hgb <7  - Avoid NG/OG placement  - Clear liquid diet, ADAT per GI recommendations  - IV PPI BID  - Octreotide gtt for 72 hours total (stop date in order already- through 10/12 pm)  - Ceftriaxone 1g q24hrs for 7 days total for SBP ppx--> transition to po cipro when discharging  - Discharge planning: will need EGD in 4 weeks for variceal treatment# Hx of alcohol use    Hx of alcohol use  Low concern for alcohol withdrawal  Last drink September 6, 2020. No signs of withdrawal during admission here.      Hx of decompensated cirrhosis complicated by esophageal varices  Of note, diagnosis of cirrhosis via labs and imaging is a bit unclear, with nodular contour of liver, though without significant synthetic dysfunction or bilirubinemia. Raises question of whether portal hypertension and varices is secondary to non cirrhotic causes.    -- No hx of HE, HRS, HPS  -- EV/GV: Last EGD 9/5/2020  with 4 EVs banded  -- Ascites/SBP: perihepatic ascites on MR abdomen 9/2020  -- Hepatitis viruses: HepA/B/C nonimmune  -- Coagulopathy: INR 1.2, platelets 83  MELD-Na score: 8 at 10/11/2020  6:13 AM  MELD score: 8 at 10/11/2020  6:13 AM  Calculated from:  Serum Creatinine: 0.82 mg/dL (Rounded to 1 mg/dL) at 10/11/2020  6:13 AM  Serum Sodium: 143 mmol/L (Rounded to 137 mmol/L) at 10/11/2020  6:13 AM  Total Bilirubin: 0.8 mg/dL (Rounded to 1 mg/dL) at 10/11/2020  6:13 AM  INR(ratio): 1.16 at 10/11/2020  6:13 AM  Age: 55 years 4 months      Type 2 diabetes mellitus. A1C 7.9%  - Hold home metformin  - Medium sliding scale insulin  - Glucose checks q4hrs        Diet: Advance Diet as Tolerated: Full Liquid Diet; [id number] Calories: Moderate Consistent CHO (4-6 CHO units/meal)    DVT Prophylaxis: ambulating  Berger Catheter: not present  Code Status: Full Code           Disposition Plan   Expected discharge: 2 - 3 days, recommended to prior living arrangement once octreotide drip has finished.  Entered: Sharon Roberts MD 10/11/2020, 2:30 PM       The patient's care was discussed with the Bedside Nurse, Patient and GI Consultant.    Sharon Roberts MD  Hospitalist Service, [address]   Contact information available via [facility] Paging/Directory  Please see sign in/sign out for up to date coverage information  ______________________________________________________________________    Interval History   Federico feels well today. He has some groin muscle pain, but other than that he has no complaints. His stool has been brown with red-tinges. Remainder of 4 pt ros neg.    Data reviewed today: I reviewed all medications, new labs and imaging results over the last 24 hours.    Physical Exam   Vital Signs: Temp: 98.2  F (36.8  C) Temp src: Oral BP: 107/76 Pulse: 67   Resp: 16 SpO2: 95 % O2 Device: None (Room air)    Weight: 179 lbs 11.2 oz  Gen: no distress, easily  interactive, sitting up in bed  HEENT: no rhinorrhea, MMM, edi sclerae anicteric and not injected  Resp: CTAB, no w/c, no increased work of breathing  CV: RRR, no mrg  Abd: soft NTND, + BS, fluid wave present  Extrem: MAEE, no pitting pedal edema edi  Neuro: cn 2-12 grossly intact, no focal deficits noted,alert, oriented  Skin: no rashes or lesions noted    Data   Recent Labs   Lab 10/11/20  0752 10/11/20  0613 10/11/20  0200 10/10/20  0307 10/10/20  0307 10/09/20  1335 10/09/20  1335   WBC  --  8.9  --   --  9.3  --  17.3*   HGB 8.1* 8.0* 7.9*   < > 8.1*   < > 8.9*   MCV  --  95  --   --  94  --  94   PLT  --  73*  --   --  83*  --  182   INR  --  1.16*  --   --  1.20*  --  1.19*   NA  --  143  --   --  142  --  143   POTASSIUM  --  3.8  --   --  4.1  --  4.6   CHLORIDE  --  111*  --   --  113*  --  114*   CO2  --  24  --   --  22  --  24   BUN  --  20  --   --  34*  --  32*   CR  --  0.82  --   --  1.00  --  0.88   ANIONGAP  --  7  --   --  7  --  5   CONSTANZA  --  7.8*  --   --  7.4*  --  7.3*   GLC  --  135*  --   --  134*  --  192*   ALBUMIN  --  3.0*  --   --  2.6*  --   --    PROTTOTAL  --  6.0*  --   --  5.1*  --   --    BILITOTAL  --  0.8  --   --  0.7  --   --    ALKPHOS  --  54  --   --  52  --   --    ALT  --  38  --   --  34  --   --    AST  --  39  --   --  25  --   --     < > = values in this interval not displayed.     No results found for this or any previous visit (from the past 24 hour(s)).  Medications     octreotide (sandoSTATIN) infusion ADULT 50 mcg/hr (10/11/20 0145)       cefTRIAXone  1 g Intravenous Q24H     insulin aspart  1-4 Units Subcutaneous Q4H     metoclopramide  10 mg Intravenous Once     pantoprazole (PROTONIX) IV  40 mg Intravenous BID     sodium chloride (PF)  3 mL Intracatheter Q8H

## 2020-10-11 NOTE — PLAN OF CARE
"/68 (BP Location: Right arm)   Pulse 80   Temp 98.3  F (36.8  C) (Oral)   Resp 16   Ht 1.816 m (5' 11.5\")   Wt 81.5 kg (179 lb 11.2 oz)   SpO2 95%   BMI 24.71 kg/m      Patient stable on RA, afebrile. -130. Intermittent abdominal pain managed w/ PRN tylenol x1. Tolerating clear liquid diet. Octreotide gtt running right PIV; Left PIV TKO. BM x2 overnight provider notified of patients black;red stools and unalarmed as this is anticipated. Voiding independently.  Up ad kingsley - SBA.   Will continue with POC and notify MD with changes or concerns.    "

## 2020-10-11 NOTE — PROGRESS NOTES
Transferred to:  at 18:20  Status at time of transfer: stable  Belongings: phone and cord with pt  Berger removed? (if no, why?): n/a  Chart and medications: with patient  Family notified: yes

## 2020-10-11 NOTE — PLAN OF CARE
"/67 (BP Location: Right arm)   Pulse 86   Temp 97.8  F (36.6  C) (Oral)   Resp 16   Ht 1.816 m (5' 11.5\")   Wt 83.9 kg (184 lb 15.5 oz)   SpO2 100%   BMI 25.44 kg/m   RA.Pt came from 4C earlier this evening and has been calm and cooperative. A&O x4 and has been trying to rest.Denies pain but has cramping with BM and using heat pad to help.Blood glucose 172 and had 1unit .Continues on octreotide gtt at 50 mch hr.Had 1 soft maroon/brown stool this evening.Up independent to bathroom and has been saving.Will continue to follow.  "

## 2020-10-12 ENCOUNTER — PATIENT OUTREACH (OUTPATIENT)
Dept: CARE COORDINATION | Facility: CLINIC | Age: 55
End: 2020-10-12

## 2020-10-12 VITALS
WEIGHT: 179.7 LBS | SYSTOLIC BLOOD PRESSURE: 111 MMHG | HEIGHT: 72 IN | RESPIRATION RATE: 16 BRPM | OXYGEN SATURATION: 98 % | HEART RATE: 61 BPM | DIASTOLIC BLOOD PRESSURE: 78 MMHG | TEMPERATURE: 98.4 F | BODY MASS INDEX: 24.34 KG/M2

## 2020-10-12 LAB
ALBUMIN SERPL-MCNC: 3.1 G/DL (ref 3.4–5)
ALP SERPL-CCNC: 65 U/L (ref 40–150)
ALT SERPL W P-5'-P-CCNC: 39 U/L (ref 0–70)
ANION GAP SERPL CALCULATED.3IONS-SCNC: 6 MMOL/L (ref 3–14)
AST SERPL W P-5'-P-CCNC: 36 U/L (ref 0–45)
BASOPHILS # BLD AUTO: 0 10E9/L (ref 0–0.2)
BASOPHILS NFR BLD AUTO: 0.3 %
BILIRUB SERPL-MCNC: 0.6 MG/DL (ref 0.2–1.3)
BUN SERPL-MCNC: 14 MG/DL (ref 7–30)
CALCIUM SERPL-MCNC: 8.2 MG/DL (ref 8.5–10.1)
CHLORIDE SERPL-SCNC: 108 MMOL/L (ref 94–109)
CO2 SERPL-SCNC: 26 MMOL/L (ref 20–32)
CREAT SERPL-MCNC: 0.92 MG/DL (ref 0.66–1.25)
DIFFERENTIAL METHOD BLD: ABNORMAL
EOSINOPHIL # BLD AUTO: 0.3 10E9/L (ref 0–0.7)
EOSINOPHIL NFR BLD AUTO: 4.1 %
ERYTHROCYTE [DISTWIDTH] IN BLOOD BY AUTOMATED COUNT: 14.9 % (ref 10–15)
GFR SERPL CREATININE-BSD FRML MDRD: >90 ML/MIN/{1.73_M2}
GLUCOSE BLDC GLUCOMTR-MCNC: 113 MG/DL (ref 70–99)
GLUCOSE BLDC GLUCOMTR-MCNC: 117 MG/DL (ref 70–99)
GLUCOSE BLDC GLUCOMTR-MCNC: 171 MG/DL (ref 70–99)
GLUCOSE SERPL-MCNC: 127 MG/DL (ref 70–99)
HCT VFR BLD AUTO: 27.5 % (ref 40–53)
HGB BLD-MCNC: 8.5 G/DL (ref 13.3–17.7)
IMM GRANULOCYTES # BLD: 0.1 10E9/L (ref 0–0.4)
IMM GRANULOCYTES NFR BLD: 0.7 %
INTERPRETATION ECG - MUSE: NORMAL
LYMPHOCYTES # BLD AUTO: 1.2 10E9/L (ref 0.8–5.3)
LYMPHOCYTES NFR BLD AUTO: 15.9 %
MCH RBC QN AUTO: 29.5 PG (ref 26.5–33)
MCHC RBC AUTO-ENTMCNC: 30.9 G/DL (ref 31.5–36.5)
MCV RBC AUTO: 96 FL (ref 78–100)
MONOCYTES # BLD AUTO: 0.6 10E9/L (ref 0–1.3)
MONOCYTES NFR BLD AUTO: 8.4 %
NEUTROPHILS # BLD AUTO: 5.2 10E9/L (ref 1.6–8.3)
NEUTROPHILS NFR BLD AUTO: 70.6 %
NRBC # BLD AUTO: 0 10*3/UL
NRBC BLD AUTO-RTO: 0 /100
PHOSPHATE SERPL-MCNC: 3 MG/DL (ref 2.5–4.5)
PLATELET # BLD AUTO: 85 10E9/L (ref 150–450)
POTASSIUM SERPL-SCNC: 4.2 MMOL/L (ref 3.4–5.3)
PROT SERPL-MCNC: 6.4 G/DL (ref 6.8–8.8)
RBC # BLD AUTO: 2.88 10E12/L (ref 4.4–5.9)
SODIUM SERPL-SCNC: 139 MMOL/L (ref 133–144)
WBC # BLD AUTO: 7.4 10E9/L (ref 4–11)

## 2020-10-12 PROCEDURE — 36415 COLL VENOUS BLD VENIPUNCTURE: CPT | Performed by: INTERNAL MEDICINE

## 2020-10-12 PROCEDURE — 99238 HOSP IP/OBS DSCHRG MGMT 30/<: CPT | Performed by: INTERNAL MEDICINE

## 2020-10-12 PROCEDURE — 36415 COLL VENOUS BLD VENIPUNCTURE: CPT | Performed by: STUDENT IN AN ORGANIZED HEALTH CARE EDUCATION/TRAINING PROGRAM

## 2020-10-12 PROCEDURE — C9113 INJ PANTOPRAZOLE SODIUM, VIA: HCPCS | Performed by: STUDENT IN AN ORGANIZED HEALTH CARE EDUCATION/TRAINING PROGRAM

## 2020-10-12 PROCEDURE — 85025 COMPLETE CBC W/AUTO DIFF WBC: CPT | Performed by: INTERNAL MEDICINE

## 2020-10-12 PROCEDURE — 84100 ASSAY OF PHOSPHORUS: CPT | Performed by: STUDENT IN AN ORGANIZED HEALTH CARE EDUCATION/TRAINING PROGRAM

## 2020-10-12 PROCEDURE — 80053 COMPREHEN METABOLIC PANEL: CPT | Performed by: INTERNAL MEDICINE

## 2020-10-12 PROCEDURE — 250N000011 HC RX IP 250 OP 636: Performed by: STUDENT IN AN ORGANIZED HEALTH CARE EDUCATION/TRAINING PROGRAM

## 2020-10-12 PROCEDURE — 258N000003 HC RX IP 258 OP 636: Performed by: INTERNAL MEDICINE

## 2020-10-12 PROCEDURE — 250N000013 HC RX MED GY IP 250 OP 250 PS 637: Performed by: INTERNAL MEDICINE

## 2020-10-12 PROCEDURE — 250N000011 HC RX IP 250 OP 636: Performed by: INTERNAL MEDICINE

## 2020-10-12 PROCEDURE — 999N001017 HC STATISTIC GLUCOSE BY METER IP

## 2020-10-12 PROCEDURE — 85025 COMPLETE CBC W/AUTO DIFF WBC: CPT | Performed by: STUDENT IN AN ORGANIZED HEALTH CARE EDUCATION/TRAINING PROGRAM

## 2020-10-12 RX ORDER — CIPROFLOXACIN 500 MG/1
500 TABLET, FILM COATED ORAL EVERY 12 HOURS
Qty: 9 TABLET | Refills: 0 | Status: SHIPPED | OUTPATIENT
Start: 2020-10-12 | End: 2023-09-21

## 2020-10-12 RX ORDER — CIPROFLOXACIN 500 MG/1
500 TABLET, FILM COATED ORAL 2 TIMES DAILY
Qty: 10 TABLET | Refills: 0 | Status: CANCELLED | OUTPATIENT
Start: 2020-10-12 | End: 2020-10-17

## 2020-10-12 RX ORDER — OMEPRAZOLE 40 MG/1
40 CAPSULE, DELAYED RELEASE ORAL
Qty: 60 CAPSULE | Refills: 0 | Status: SHIPPED | OUTPATIENT
Start: 2020-10-12 | End: 2023-11-27 | Stop reason: ALTCHOICE

## 2020-10-12 RX ORDER — CIPROFLOXACIN 500 MG/1
500 TABLET, FILM COATED ORAL 2 TIMES DAILY
Status: CANCELLED | OUTPATIENT
Start: 2020-10-12

## 2020-10-12 RX ORDER — CIPROFLOXACIN 500 MG/1
500 TABLET, FILM COATED ORAL EVERY 12 HOURS SCHEDULED
Status: DISCONTINUED | OUTPATIENT
Start: 2020-10-12 | End: 2020-10-12 | Stop reason: HOSPADM

## 2020-10-12 RX ORDER — NADOLOL 20 MG/1
20 TABLET ORAL DAILY
Qty: 30 TABLET | Refills: 0 | Status: ON HOLD | OUTPATIENT
Start: 2020-10-12 | End: 2024-01-31

## 2020-10-12 RX ADMIN — CIPROFLOXACIN HYDROCHLORIDE 500 MG: 500 TABLET, FILM COATED ORAL at 10:36

## 2020-10-12 RX ADMIN — PANTOPRAZOLE SODIUM 40 MG: 40 INJECTION, POWDER, FOR SOLUTION INTRAVENOUS at 07:52

## 2020-10-12 RX ADMIN — OCTREOTIDE ACETATE 50 MCG/HR: 200 INJECTION, SOLUTION INTRAVENOUS; SUBCUTANEOUS at 08:04

## 2020-10-12 ASSESSMENT — ACTIVITIES OF DAILY LIVING (ADL)
ADLS_ACUITY_SCORE: 15

## 2020-10-12 NOTE — PLAN OF CARE
Focus: Discharge  D/I: Pt deemed appropriate for discharge. Octreotide gtt continued until 1430. Pt denies pain, eating well on regular diet. Reported 2 BM's this morning, states there seems to be less blood in stools. Hgb 8.5, stable for discharge. Discharge instructions reviewed, questions answered. PIV removed. Pt ambulated to pharmacy and front doors for private transportation.  A/P: Starting PPI and antibiotic at home.

## 2020-10-12 NOTE — PLAN OF CARE
VSS, on RA. BG WNL overnight. Octreotide gtt stopped per orders. PIV's now SL'd. UAL in room. Bm x2, soft brown/red. Pt hopeful he can discharge today.

## 2020-10-13 NOTE — DISCHARGE SUMMARY
[facility]   Hospitalist Discharge Summary      Date of Admission:  10/9/2020  Date of Discharge:  10/12/2020  2:36 PM  Discharging Provider: Sharon Roberts MD  Discharge Team: Hospitalist Service, Gold 11    Discharge Diagnoses   Upper GI bleed from esophageal varices in the context of portal hypertension  Acute blood loss anemia from above  Transient hypotension from acute blood loss  DM2  Hx of alcohol use- no withdrawal  Hx of decompensated cirrhosis complicated by esophageal varices  Nicotine dependence    Follow-ups Needed After Discharge   Follow-up Appointments     Follow Up and recommended labs and tests      Follow up with [facility] in 1 month for follow-up EGD             Discharge Disposition   Discharged to home  Condition at discharge: Good    Hospital Course          Federico Bentley is a 55 year old male with PMH suspected cirrhosis c/b esophageal varices, diabetes mellitus 2, and smoking who was admitted on 10/9/2020 for hematemesis found secondary to bleeding esophageal varices. EGD performed 10/9 and required banding x3 for large bleeding esophageal varices.  He was admitted to the ICU for intubation due to airway protection in context of initial hematemesis.    Upper GIB from esophageal varices in the context of portal htn  Acute blood loss anemia  Hypotension from acute blood loss- now resolved  Intubated for airway protection due to bleeding esophageal varices. EGD with banding on 10/9. He received 2 U PRBCs total. He was briefly on a norepi drip and had fluid rehydration in the MICU. His hemoglobin remained stable for the 72 hrs prior to discharge when he was on an octreotide drip. He received ceftriaxone, which was transitioned to cipro at discharge for 7 days of SBP ppx. He will f/u at [facility] in 4 wks with repeat EGD.    Hx of alcohol use  Low concern for alcohol withdrawal  Last drink September 6, 2020. No signs of withdrawal during admission  here.      Hx of decompensated cirrhosis complicated by esophageal varices  Of note, diagnosis of cirrhosis via labs and imaging is a bit unclear, with nodular contour of liver, though without significant synthetic dysfunction or bilirubinemia. Raises question of whether portal hypertension and varices is secondary to non cirrhotic causes.    -- No hx of HE, HRS, HPS  -- EV/GV: Last EGD 9/5/2020 with 4 EVs banded  -- Ascites/SBP: perihepatic ascites on MR abdomen 9/2020  -- Hepatitis viruses: HepA/B/C nonimmune  -- Coagulopathy: INR 1.2, platelets 83  MELD-Na score: 8 at 10/11/2020  6:13 AM  MELD score: 8 at 10/11/2020  6:13 AM  Calculated from:  Serum Creatinine: 0.82 mg/dL (Rounded to 1 mg/dL) at 10/11/2020  6:13 AM  Serum Sodium: 143 mmol/L (Rounded to 137 mmol/L) at 10/11/2020  6:13 AM  Total Bilirubin: 0.8 mg/dL (Rounded to 1 mg/dL) at 10/11/2020  6:13 AM  INR(ratio): 1.16 at 10/11/2020  6:13 AM  Age: 55 years 4 months     Nadolol was represcribed at discharge along with his PPI BID.     Type 2 diabetes mellitus. A1C 7.9%  Home metformin was held, and he had correction scale insulin given. Metformin was resumed at discharge.       Consultations This Hospital Stay   GI HEPATOLOGY ADULT IP CONSULT  CHEMICAL DEPENDENCY IP CONSULT      Time Spent on this Encounter   I, Sharon Roberts MD, personally saw the patient today and spent less than or equal to 30 minutes discharging this patient.       Sharon Roberts MD  [facility] [address] [address]  Phone: [phone]  ______________________________________________________________________    Physical Exam   Vital Signs: Temp: 98.4  F (36.9  C) Temp src: Oral BP: 111/78 Pulse: 61   Resp: 16 SpO2: 98 % O2 Device: None (Room air)    Weight: 179 lbs 11.2 oz  Gen: no distress, easily interactive, standing up by bed  HEENT: no rhinorrhea, MMM, edi sclerae anicteric and not injected  Resp: CTAB, no w/c, no  increased work of breathing  CV: RRR, no rg  Abd: soft NTND, + BS, fluid wave present  Extrem: MAEE, no pitting pedal edema edi  Neuro: cn 2-12 grossly intact, no focal deficits noted,alert, oriented  Skin: no rashes or lesions noted       Primary Care Physician   [facility]    Discharge Orders      Reason for your hospital stay    You were admitted with a Esophageal varices bleed.     Follow Up and recommended labs and tests    Follow up with [facility] in 1 month for follow-up EGD     Activity    Your activity upon discharge: activity as tolerated     Diet    Follow this diet upon discharge: Orders Placed This Encounter      Moderate Consistent CHO Diet       Significant Results and Procedures   Most Recent 3 CBC's:  Recent Labs   Lab Test 10/12/20  0638 10/11/20  1941 10/11/20  0752 10/11/20  0613 10/10/20  0307 10/10/20  0307   WBC 7.4  --   --  8.9  --  9.3   HGB 8.5* 8.0* 8.1* 8.0*   < > 8.1*   MCV 96  --   --  95  --  94   PLT 85*  --   --  73*  --  83*    < > = values in this interval not displayed.     Most Recent 3 BMP's:  Recent Labs   Lab Test 10/12/20  0638 10/11/20  0613 10/10/20  0307    143 142   POTASSIUM 4.2 3.8 4.1   CHLORIDE 108 111* 113*   CO2 26 24 22   BUN 14 20 34*   CR 0.92 0.82 1.00   ANIONGAP 6 7 7   CONSTANZA 8.2* 7.8* 7.4*   * 135* 134*     Most Recent 2 LFT's:  Recent Labs   Lab Test 10/12/20  0638 10/11/20  0613   AST 36 39   ALT 39 38   ALKPHOS 65 54   BILITOTAL 0.6 0.8     Most Recent 3 INR's:  Recent Labs   Lab Test 10/11/20  0613 10/10/20  0307 10/09/20  1335   INR 1.16* 1.20* 1.19*   ,   Results for orders placed or performed during the hospital encounter of 10/09/20   XR Chest Port 1 View    Narrative    EXAMINATION:  XR CHEST PORT 1 VW 10/9/2020 6:36 PM.    COMPARISON: None..    HISTORY:  ETT assessment    FINDINGS: Portable AP view of the chest. Endotracheal tube tip  projects over the mid trachea. The trachea is midline. The  cardiomediastinal silhouette is  within normal limits. Pulmonary  vasculature is distinct. Borderline low lung volumes. Interstitial  prominence of bilateral lung fields. No pleural effusion or  pneumothorax. The upper abdomen is unremarkable.      Impression    IMPRESSION:   1. Endotracheal tube tip projects over the mid trachea.  2. Interstitial airspace opacity.    I have personally reviewed the examination and initial interpretation  and I agree with the findings.    SILVIA FOSTER MD   XR Chest Port 1 View    Narrative    EXAM: XR CHEST PORT 1 VW  10/10/2020 6:58 AM     HISTORY:  ICU monitoring       COMPARISON:  Chest x-ray 10/9/2020.    FINDINGS: AP radiograph of the chest. Endotracheal tube projecting  over the midthoracic trachea. Heart size is normal. No pneumothorax or  pleural effusion. Lower lung fields predominantly interstitial  opacities. The visualized upper abdomen is unremarkable. No acute  osseous abnormality.      Impression    IMPRESSION:  1. Endotracheal tube projecting over the midthoracic trachea.  2. Unchanged interstitial opacities in the lower lung fields.    I have personally reviewed the examination and initial interpretation  and I agree with the findings.    SESAR CUEVAS MD   US Abdomen Complete w Doppler Complete    Narrative    EXAMINATION: US ABDOMEN COMPLETE WITH DOPPLER COMPLETE 10/10/2020 9:29  AM     COMPARISON: None available    HISTORY: Alcoholic liver disease complicated by esophageal variceal  bleeding status post banding    TECHNIQUE: The abdomen was scanned in standard fashion with  specialized ultrasound transducer(s) using both gray-scale, color  Doppler, and spectral flow techniques.    Findings:  Pancreas: Obscured    Liver: The liver measures 15 cm and demonstrates increased parenchymal  echogenicity, coarsened echotexture, and mild capsular nodularity. No  evidence of a focal hepatic mass. The main portal vein is patent with  antegrade flow.    Gallbladder:  There is wall thickening up  to 7 mm without  cholelithiasis, hyperemia, pericholecystic fluid, or positive  sonographic German's sign.    Bile Ducts: The common bile duct measures 3 mm in diameter.    Right Kidney: Measures 10.8 cm in length with normal parenchymal  echogenicity and cortical thickness. No hydronephrosis. 2.3 cm  benign-appearing cyst in the upper pole.  Left Kidney: Measures 10.4 cm in length with normal parenchymal  echogenicity and cortical thickness. No hydronephrosis.    Spleen: The spleen is enlarged, measuring 14.9 cm.    Aorta and IVC: The visualized portions of the aorta and IVC are  unremarkable. The proximal aorta measures 1.8 cm in diameter and the  IVC measures 2.0 cm in diameter.    Fluid: No visualized  ascites or pleural effusions.    Extrahepatic portal vein flow is antegrade at 25 cm/s.  Right portal vein flow is antegrade, measuring 23 cm/s.  Left portal vein flow is antegrade, measuring 32 cm/s.    Flow in the hepatic artery is towards the liver and:  87 cm/s peak systolic  0.69 resistive index.     The splenic vein is patent and flow is towards the liver.  The left,  middle, and right hepatic veins are patent with flow towards the IVC.  The IVC is patent with flow towards the heart.   The visualized aorta  is not dilated.      Impression    Impression:   1. Cirrhotic liver configuration with splenomegaly. No focal hepatic  mass.  2. Patent Doppler evaluation of the liver.  3. Gallbladder wall thickening without cholelithiasis or additional  evidence of acute cholecystitis, favored sequela of hepatic disease.    I have personally reviewed the examination and initial interpretation  and I agree with the findings.    SILVIA FOSTER MD       Discharge Medications   Discharge Medication List as of 10/12/2020  2:20 PM      START taking these medications    Details   ciprofloxacin (CIPRO) 500 MG tablet Take 1 tablet (500 mg) by mouth every 12 hours, Disp-9 tablet, R-0, E-Prescribe      omeprazole (PRILOSEC) 40  MG DR capsule Take 1 capsule (40 mg) by mouth 2 times daily (before meals), Disp-60 capsule, R-0, E-Prescribe         CONTINUE these medications which have CHANGED    Details   nadolol (CORGARD) 20 MG tablet Take 1 tablet (20 mg) by mouth daily, Disp-30 tablet, R-0, E-Prescribe         CONTINUE these medications which have NOT CHANGED    Details   metFORMIN (GLUCOPHAGE) 1000 MG tablet Take 1,000 mg by mouth 2 times daily (with meals), Historical         STOP taking these medications       pantoprazole (PROTONIX) 40 MG EC tablet Comments:   Reason for Stopping:             Allergies   Allergies   Allergen Reactions     Influenza Virus Vaccine [id number] Anaphylaxis     twice       Contrast Dye

## 2023-09-21 ENCOUNTER — OFFICE VISIT (OUTPATIENT)
Dept: SURGERY | Facility: OTHER | Age: 58
End: 2023-09-21
Payer: COMMERCIAL

## 2023-09-21 VITALS
TEMPERATURE: 97.5 F | BODY MASS INDEX: 24.16 KG/M2 | WEIGHT: 178.4 LBS | HEIGHT: 72 IN | DIASTOLIC BLOOD PRESSURE: 68 MMHG | SYSTOLIC BLOOD PRESSURE: 108 MMHG

## 2023-09-21 DIAGNOSIS — L03.316 CELLULITIS OF UMBILICUS: Primary | ICD-10-CM

## 2023-09-21 DIAGNOSIS — K72.10 CHRONIC LIVER FAILURE WITHOUT HEPATIC COMA (H): ICD-10-CM

## 2023-09-21 PROBLEM — F10.10 ALCOHOL ABUSE: Status: ACTIVE | Noted: 2023-09-21

## 2023-09-21 PROBLEM — E11.9 TYPE 2 DIABETES MELLITUS, WITHOUT LONG-TERM CURRENT USE OF INSULIN (H): Status: ACTIVE | Noted: 2020-05-20

## 2023-09-21 PROBLEM — K92.2 UGIB (UPPER GASTROINTESTINAL BLEED): Status: ACTIVE | Noted: 2020-09-04

## 2023-09-21 PROBLEM — K57.90 DIVERTICULAR DISEASE: Status: ACTIVE | Noted: 2023-09-21

## 2023-09-21 PROBLEM — K74.60 HEPATIC CIRRHOSIS (H): Status: ACTIVE | Noted: 2020-09-06

## 2023-09-21 PROBLEM — I85.11 SECONDARY ESOPHAGEAL VARICES WITH BLEEDING (H): Status: ACTIVE | Noted: 2020-09-14

## 2023-09-21 PROBLEM — K76.6 PORTAL HYPERTENSION (H): Status: ACTIVE | Noted: 2020-09-06

## 2023-09-21 PROBLEM — R80.9 MICROALBUMINURIA DUE TO TYPE 2 DIABETES MELLITUS (H): Status: ACTIVE | Noted: 2022-09-19

## 2023-09-21 PROBLEM — E11.29 MICROALBUMINURIA DUE TO TYPE 2 DIABETES MELLITUS (H): Status: ACTIVE | Noted: 2022-09-19

## 2023-09-21 PROCEDURE — 99204 OFFICE O/P NEW MOD 45 MIN: CPT | Performed by: SURGERY

## 2023-09-21 RX ORDER — MULTIVITAMIN,THER AND MINERALS
1 TABLET ORAL DAILY
Status: ON HOLD | COMMUNITY
End: 2024-01-27

## 2023-09-21 RX ORDER — CEPHALEXIN 500 MG/1
500 CAPSULE ORAL 2 TIMES DAILY
Qty: 20 CAPSULE | Refills: 0 | Status: SHIPPED | OUTPATIENT
Start: 2023-09-21 | End: 2023-10-01

## 2023-09-21 ASSESSMENT — PAIN SCALES - GENERAL: PAINLEVEL: SEVERE PAIN (7)

## 2023-09-21 NOTE — LETTER
9/21/2023         RE: Federico Bentley  [address]  [place] [address]        Dear Colleague,    Thank you for referring your patient, Federico Bentley, to the [facility]. Please see a copy of my visit note below.    Patient seen in consultation for umbilical hernia    HPI:  Patient is a 58 year old male with several year history of umbilical hernia.  He is a chronic alcoholic with worsening ascites on recent CT scan at an urgent care.  He had elevation of liver functions bilirubin and INR were also.  He complains of pain at the umbilicus with new drainage.  There is redness with hypersensitivity in this area as well.  He had prior sigmoid colectomy for diverticulitis many years ago with a lower midline incision.  He says he stopped drinking 5 days ago.  He is a well-controlled diabetic.  He is a smoker.    Review Of Systems    Skin: as above  Ears/Nose/Throat: negative  Respiratory: No shortness of breath, dyspnea on exertion, cough, or hemoptysis  Cardiovascular: negative  Gastrointestinal: as above  Genitourinary: negative  Musculoskeletal: negative  Neurologic: negative  Hematologic/Lymphatic/Immunologic: as above  Endocrine: as above      No past medical history on file.    Past Surgical History:   Procedure Laterality Date     ESOPHAGOSCOPY, GASTROSCOPY, DUODENOSCOPY (EGD), COMBINED N/A 10/9/2020    Procedure: ESOPHAGOGASTRODUODENOSCOPY (EGD);  Surgeon: Mary Wheatley DO;  Location:  GI       No family history on file.    Social History     Socioeconomic History     Marital status:      Spouse name: Not on file     Number of children: Not on file     Years of education: Not on file     Highest education level: Not on file   Occupational History     Not on file   Tobacco Use     Smoking status: Every Day     Types: Cigarettes     Smokeless tobacco: Never   Substance and Sexual Activity     Alcohol use: Not on file     Drug use: Not on file     Sexual activity:  Not on file   Other Topics Concern     Not on file   Social History Narrative     Not on file     Social Determinants of Health     Financial Resource Strain: Not on file   Food Insecurity: Not on file   Transportation Needs: Not on file   Physical Activity: Not on file   Stress: Not on file   Social Connections: Not on file   Interpersonal Safety: Not on file   Housing Stability: Not on file       Current Outpatient Medications   Medication Sig Dispense Refill     cephALEXin (KEFLEX) 500 MG capsule Take 1 capsule (500 mg) by mouth 2 times daily for 10 days 20 capsule 0     metFORMIN (GLUCOPHAGE) 1000 MG tablet Take 1,000 mg by mouth 2 times daily (with meals)       nadolol (CORGARD) 20 MG tablet Take 1 tablet (20 mg) by mouth daily 30 tablet 0     omeprazole (PRILOSEC) 40 MG DR capsule Take 1 capsule (40 mg) by mouth 2 times daily (before meals) 60 capsule 0     Multiple Vitamins-Minerals (SUPER THERA PREET M) TABS Take 1 tablet by mouth daily         Medications and history reviewed    Physical exam:  Vitals: /68 (BP Location: Right arm, Patient Position: Sitting, Cuff Size: Adult Regular)   Temp 97.5  F (36.4  C) (Temporal)   Ht 1.829 m (6')   Wt 80.9 kg (178 lb 6.4 oz)   BMI 24.20 kg/m    BMI= Body mass index is 24.2 kg/m .    Constitutional: alert, non-distressed , frail and cachectic  Head: normo-cephalic, atraumatic   Cardiovascular: RRR  Respiratory: equal chest rise, good respiratory effort, no audible wheeze  Gastrointestinal: Soft, distended, cellulitic changes at the umbilicus with wet spot on his overlying clothing.  Tender bulge.  : deferred  Musculoskeletal: moves all extremities   Skin: no suspicious lesions or rashes   Psychiatric: mentation appears normal, affect appropriate   Patient able to get up on table without difficulty.    Labs show:  No results found for this or any previous visit (from the past 24 hour(s)).    Imaging shows:  Recent Results (from the past 744 hour(s))   XRAY  CHEST PORTABLE    Narrative    EXAM: XR CHEST AP PORT       DATE: 9/18/2023 9:56 AM    CLINICAL DATA: Cough, Additional Info:    VIEWS: An AP view of the chest was obtained.    Findings:    Lines/Tubes: None    The cardiac mediastinal silhouette is within normal limits. The pulmonary vasculature is distinct. No focal pulmonary opacity, pleural effusion, or pneumothorax. Degenerative changes of the acromioclavicular joint. Upper abdomen is unremarkable.    Impression    IMPRESSION:     1.  No active cardiopulmonary process demonstrated.      REPORT SIGNED BY DR. Tres German   CT ABDOMEN & PELVIS W/O ORAL W/O IV CON    Narrative    EXAM:  CT ABDOMEN & PELVIS W/O ORAL W/O IV CON    DATE: 9/18/2023 10:24 AM    CLINICAL DATA:  Umbilical hernia. Pain.    COMPARISON:  July 7, 2023    TECHNIQUE:  Thin section CT slices through the abdomen and pelvis without IV or oral contrast.  Coronal reconstruction images obtained.    FINDINGS:    Calcified granuloma in the right lower lobe. Bibasilar atelectasis. No pleural effusion.    Nodular hepatic contour. Enlarged spleen. No acute finding in the liver, pancreas, spleen, or adrenal glands on these noncontrast images. Gallstones.    Hypodense ascites in the abdomen and the pelvis. The ascites extends into an umbilical hernia sac.    Right renal cyst. No renal calcifications. No ureteral dilatation or calcifications. Bladder is distended. Prostate is enlarged.    Colonic diverticulosis. No extraluminal gas or drainable fluid collection. No segmental bowel dilatation. Stomach is collapsed, but thick walled. Possible wall thickening along the distal esophagus as well. Numerous lymph nodes in the upper abdomen and retroperitoneum, similar to the comparison exam.    Advanced vascular calcifications. Normal IVC. Degenerative changes. Chronic L5 pars defect with stable anterolisthesis. Advanced degenerative disc disease at L5-S1.    Impression    IMPRESSION:    1.  Ascites extends into an  umbilical hernia sac.  2.  Cirrhosis with portal hypertension.  3.  The stomach is largely collapsed, but does appear thick walled. The distal esophagus also appears thick-walled. Consider gastritis/esophagitis as well as other processes.  4.  Cholelithiasis.  5.  Colonic diverticulosis.  6.  Stable abdominal and retroperitoneal adenopathy.    REPORT SIGNED BY DR. JIM WILEY        Assessment:     ICD-10-CM    1. Cellulitis of umbilicus  L03.316 cephALEXin (KEFLEX) 500 MG capsule      2. Chronic liver failure without hepatic coma (H)  K72.10 Adult GI  Referral - Consult Only        Plan: Unfortunately Federico has a number of issues that are of higher priority than his umbilical hernia.  He needs to be seen soon by hepatology for medical optimization of liver failure.  He also has had multiple abnormal CT scans of the upper GI tract and should have endoscopic evaluation at some point.  I recommended and educated him on the importance and benefits of alcohol and smoking cessation.  Should he become medically optimized, he may eventually be a candidate for elective hernia repair but not at this point.  We did discuss indications for urgent or emergent surgery for his umbilical hernia including obstruction and strangulation.  I have ordered Keflex for the cellulitis at his umbilicus but he understands that this is not a long-term solution.  I have placed urgent referral for hepatology.    45 minutes spent by me on the date of the encounter doing chart review, history and exam, documentation and further activities per the note    Remington Valencia DO      Again, thank you for allowing me to participate in the care of your patient.        Sincerely,        Remington Valencia, DO

## 2023-09-21 NOTE — PROGRESS NOTES
Patient seen in consultation for umbilical hernia    HPI:  Patient is a 58 year old male with several year history of umbilical hernia.  He is a chronic alcoholic with worsening ascites on recent CT scan at an urgent care.  He had elevation of liver functions bilirubin and INR were also.  He complains of pain at the umbilicus with new drainage.  There is redness with hypersensitivity in this area as well.  He had prior sigmoid colectomy for diverticulitis many years ago with a lower midline incision.  He says he stopped drinking 5 days ago.  He is a well-controlled diabetic.  He is a smoker.    Review Of Systems    Skin: as above  Ears/Nose/Throat: negative  Respiratory: No shortness of breath, dyspnea on exertion, cough, or hemoptysis  Cardiovascular: negative  Gastrointestinal: as above  Genitourinary: negative  Musculoskeletal: negative  Neurologic: negative  Hematologic/Lymphatic/Immunologic: as above  Endocrine: as above      No past medical history on file.    Past Surgical History:   Procedure Laterality Date    ESOPHAGOSCOPY, GASTROSCOPY, DUODENOSCOPY (EGD), COMBINED N/A 10/9/2020    Procedure: ESOPHAGOGASTRODUODENOSCOPY (EGD);  Surgeon: Mary Wheatley DO;  Location: [facility]       No family history on file.    Social History     Socioeconomic History    Marital status:      Spouse name: Not on file    Number of children: Not on file    Years of education: Not on file    Highest education level: Not on file   Occupational History    Not on file   Tobacco Use    Smoking status: Every Day     Types: Cigarettes    Smokeless tobacco: Never   Substance and Sexual Activity    Alcohol use: Not on file    Drug use: Not on file    Sexual activity: Not on file   Other Topics Concern    Not on file   Social History Narrative    Not on file     Social Determinants of Health     Financial Resource Strain: Not on file   Food Insecurity: Not on file   Transportation Needs: Not on file   Physical Activity: Not on  file   Stress: Not on file   Social Connections: Not on file   Interpersonal Safety: Not on file   Housing Stability: Not on file       Current Outpatient Medications   Medication Sig Dispense Refill    cephALEXin (KEFLEX) 500 MG capsule Take 1 capsule (500 mg) by mouth 2 times daily for 10 days 20 capsule 0    metFORMIN (GLUCOPHAGE) 1000 MG tablet Take 1,000 mg by mouth 2 times daily (with meals)      nadolol (CORGARD) 20 MG tablet Take 1 tablet (20 mg) by mouth daily 30 tablet 0    omeprazole (PRILOSEC) 40 MG DR capsule Take 1 capsule (40 mg) by mouth 2 times daily (before meals) 60 capsule 0    Multiple Vitamins-Minerals (SUPER THERA PREET M) TABS Take 1 tablet by mouth daily         Medications and history reviewed    Physical exam:  Vitals: /68 (BP Location: Right arm, Patient Position: Sitting, Cuff Size: Adult Regular)   Temp 97.5  F (36.4  C) (Temporal)   Ht 1.829 m (6')   Wt 80.9 kg (178 lb 6.4 oz)   BMI 24.20 kg/m    BMI= Body mass index is 24.2 kg/m .    Constitutional: alert, non-distressed , frail and cachectic  Head: normo-cephalic, atraumatic   Cardiovascular: RRR  Respiratory: equal chest rise, good respiratory effort, no audible wheeze  Gastrointestinal: Soft, distended, cellulitic changes at the umbilicus with wet spot on his overlying clothing.  Tender bulge.  : deferred  Musculoskeletal: moves all extremities   Skin: no suspicious lesions or rashes   Psychiatric: mentation appears normal, affect appropriate   Patient able to get up on table without difficulty.    Labs show:  No results found for this or any previous visit (from the past 24 hour(s)).    Imaging shows:  Recent Results (from the past 744 hour(s))   XRAY CHEST PORTABLE    Narrative    EXAM: XR CHEST AP PORT       DATE: 9/18/2023 9:56 AM    CLINICAL DATA: Cough, Additional Info:    VIEWS: An AP view of the chest was obtained.    Findings:    Lines/Tubes: None    The cardiac mediastinal silhouette is within normal limits.  The pulmonary vasculature is distinct. No focal pulmonary opacity, pleural effusion, or pneumothorax. Degenerative changes of the acromioclavicular joint. Upper abdomen is unremarkable.    Impression    IMPRESSION:     1.  No active cardiopulmonary process demonstrated.      REPORT SIGNED BY DR. Tres German   CT ABDOMEN & PELVIS W/O ORAL W/O IV CON    Narrative    EXAM:  CT ABDOMEN & PELVIS W/O ORAL W/O IV CON    DATE: 9/18/2023 10:24 AM    CLINICAL DATA:  Umbilical hernia. Pain.    COMPARISON:  July 7, 2023    TECHNIQUE:  Thin section CT slices through the abdomen and pelvis without IV or oral contrast.  Coronal reconstruction images obtained.    FINDINGS:    Calcified granuloma in the right lower lobe. Bibasilar atelectasis. No pleural effusion.    Nodular hepatic contour. Enlarged spleen. No acute finding in the liver, pancreas, spleen, or adrenal glands on these noncontrast images. Gallstones.    Hypodense ascites in the abdomen and the pelvis. The ascites extends into an umbilical hernia sac.    Right renal cyst. No renal calcifications. No ureteral dilatation or calcifications. Bladder is distended. Prostate is enlarged.    Colonic diverticulosis. No extraluminal gas or drainable fluid collection. No segmental bowel dilatation. Stomach is collapsed, but thick walled. Possible wall thickening along the distal esophagus as well. Numerous lymph nodes in the upper abdomen and retroperitoneum, similar to the comparison exam.    Advanced vascular calcifications. Normal IVC. Degenerative changes. Chronic L5 pars defect with stable anterolisthesis. Advanced degenerative disc disease at L5-S1.    Impression    IMPRESSION:    1.  Ascites extends into an umbilical hernia sac.  2.  Cirrhosis with portal hypertension.  3.  The stomach is largely collapsed, but does appear thick walled. The distal esophagus also appears thick-walled. Consider gastritis/esophagitis as well as other processes.  4.  Cholelithiasis.  5.   Colonic diverticulosis.  6.  Stable abdominal and retroperitoneal adenopathy.    REPORT SIGNED BY DR. JIM WILEY        Assessment:     ICD-10-CM    1. Cellulitis of umbilicus  L03.316 cephALEXin (KEFLEX) 500 MG capsule      2. Chronic liver failure without hepatic coma (H)  K72.10 Adult GI  Referral - Consult Only        Plan: Unfortunately Federico has a number of issues that are of higher priority than his umbilical hernia.  He needs to be seen soon by hepatology for medical optimization of liver failure.  He also has had multiple abnormal CT scans of the upper GI tract and should have endoscopic evaluation at some point.  I recommended and educated him on the importance and benefits of alcohol and smoking cessation.  Should he become medically optimized, he may eventually be a candidate for elective hernia repair but not at this point.  We did discuss indications for urgent or emergent surgery for his umbilical hernia including obstruction and strangulation.  I have ordered Keflex for the cellulitis at his umbilicus but he understands that this is not a long-term solution.  I have placed urgent referral for hepatology.    45 minutes spent by me on the date of the encounter doing chart review, history and exam, documentation and further activities per the note    Remington Valencia, DO

## 2023-10-10 ENCOUNTER — PRE VISIT (OUTPATIENT)
Dept: GASTROENTEROLOGY | Facility: CLINIC | Age: 58
End: 2023-10-10
Payer: COMMERCIAL

## 2023-10-10 ENCOUNTER — OFFICE VISIT (OUTPATIENT)
Dept: GASTROENTEROLOGY | Facility: CLINIC | Age: 58
End: 2023-10-10
Attending: STUDENT IN AN ORGANIZED HEALTH CARE EDUCATION/TRAINING PROGRAM
Payer: COMMERCIAL

## 2023-10-10 ENCOUNTER — TELEPHONE (OUTPATIENT)
Dept: GASTROENTEROLOGY | Facility: CLINIC | Age: 58
End: 2023-10-10
Payer: COMMERCIAL

## 2023-10-10 VITALS
SYSTOLIC BLOOD PRESSURE: 132 MMHG | BODY MASS INDEX: 25.19 KG/M2 | HEART RATE: 66 BPM | DIASTOLIC BLOOD PRESSURE: 80 MMHG | HEIGHT: 72 IN | WEIGHT: 186 LBS

## 2023-10-10 DIAGNOSIS — K70.31 ALCOHOLIC CIRRHOSIS OF LIVER WITH ASCITES (H): ICD-10-CM

## 2023-10-10 DIAGNOSIS — F10.10 ALCOHOL ABUSE: ICD-10-CM

## 2023-10-10 DIAGNOSIS — I85.01 BLEEDING ESOPHAGEAL VARICES, UNSPECIFIED ESOPHAGEAL VARICES TYPE (H): ICD-10-CM

## 2023-10-10 DIAGNOSIS — K72.10 CHRONIC LIVER FAILURE WITHOUT HEPATIC COMA (H): ICD-10-CM

## 2023-10-10 DIAGNOSIS — K92.2 UGIB (UPPER GASTROINTESTINAL BLEED): Primary | ICD-10-CM

## 2023-10-10 PROCEDURE — 99205 OFFICE O/P NEW HI 60 MIN: CPT | Performed by: STUDENT IN AN ORGANIZED HEALTH CARE EDUCATION/TRAINING PROGRAM

## 2023-10-10 PROCEDURE — 99213 OFFICE O/P EST LOW 20 MIN: CPT | Performed by: STUDENT IN AN ORGANIZED HEALTH CARE EDUCATION/TRAINING PROGRAM

## 2023-10-10 ASSESSMENT — PATIENT HEALTH QUESTIONNAIRE - PHQ9
SUM OF ALL RESPONSES TO PHQ QUESTIONS 1-9: 9
SUM OF ALL RESPONSES TO PHQ QUESTIONS 1-9: 9
10. IF YOU CHECKED OFF ANY PROBLEMS, HOW DIFFICULT HAVE THESE PROBLEMS MADE IT FOR YOU TO DO YOUR WORK, TAKE CARE OF THINGS AT HOME, OR GET ALONG WITH OTHER PEOPLE: SOMEWHAT DIFFICULT

## 2023-10-10 ASSESSMENT — PAIN SCALES - GENERAL: PAINLEVEL: MILD PAIN (2)

## 2023-10-10 NOTE — TELEPHONE ENCOUNTER
Call to patient to offer sooner appointment for today at 1230 with Dr. Watkins. Was not able to reach patient, but was able to reach his wife, Mary. Wife agreed to take appointment. Patient is scheduled with Dr. Watkins today at 12:30 pm.    [facility] ANDREWS LPN  [facility]

## 2023-10-10 NOTE — PROGRESS NOTES
[facility] New Patient Visit    Date of Visit: October 10, 2023    Reason for referral: alcohol related liver disease    Subjective: Mr. Bentley is a 58 year old man with history of alcohol use disorder, alcohol-related liver disease with variceal bleeding, presents for evaluation of his liver disease and new onset ascites.    No previous known liver disease, abnormal LFTs, imaging tests. No known history of liver decompensation    He had a CT scan September 18 that showed ascites extending into his umbilical hernia.  Was given oral antibiotics for concern for cellulitis around his hernia.  Also showed cirrhosis with portal hypertension.  Concern for gastritis and esophagitis seen on his CT scan.  Ascites was first seen on imaging July 2023.  He has never had a paracentesis.  Is not on diuretics.  Has been losing weight in terms of muscle mass over the past year.  Is having a hard time eating.  Hernia is uncomfortable due to distention.    Labs from September 18 showed total bilirubin 2.6, liver enzymes are otherwise normal. INR was 1.2.  Sodium 132, creatinine 0.91.  Last drink was around September 17.  Was sober for 5 months after variceal bleeding in the past.  Has not done any alcohol counseling or treatment    Labs from July 4 showed total bilirubin 2.1, .  Labs from September 2022 showed total bilirubin 1.6, AST 64.    He saw surgeon October 2022 for his worsening hernia, they were concerned it was worsening due to ascites.  Recommended he follow-up with GI.    He was admitted to the hospital December 2020 for GI bleeding related to variceal bleeding.  Underwent banding.  At that time he had perihepatic ascites on imaging.  Followed up with [facility] for follow-up endoscopies with banding.  Last EGD was somewhere in 2021.  Does not think he was seen in the clinic.    Taking omeprazole 20 mg daily for GERD    ROS: 14 point ROS negative except for positives noted in  HPI.    PMHx:  AUD in early remission  ARLD with variceal bleeding and ascites  GERD  DM -better controlled with weight loss due to cirrhosis    PSHx:  Past Surgical History:   Procedure Laterality Date     ESOPHAGOSCOPY, GASTROSCOPY, DUODENOSCOPY (EGD), COMBINED N/A 10/9/2020    Procedure: ESOPHAGOGASTRODUODENOSCOPY (EGD);  Surgeon: Mary Wheatley DO;  Location: [facility]       FamHx:  No family history of liver disease, liver cancer    SocHx:  Social History     Socioeconomic History     Marital status:      Spouse name: Not on file     Number of children: Not on file     Years of education: Not on file     Highest education level: Not on file   Occupational History     Not on file   Tobacco Use     Smoking status: Every Day     Types: Cigarettes     Smokeless tobacco: Never   Substance and Sexual Activity     Alcohol use: Not on file     Drug use: Not on file     Sexual activity: Not on file   Other Topics Concern     Not on file   Social History Narrative     Not on file     Social Determinants of Health     Financial Resource Strain: Not on file   Food Insecurity: Not on file   Transportation Needs: Not on file   Physical Activity: Not on file   Stress: Not on file   Social Connections: Not on file   Interpersonal Safety: Not on file   Housing Stability: Not on file       Medications:  Current Outpatient Medications   Medication     metFORMIN (GLUCOPHAGE) 1000 MG tablet     Multiple Vitamins-Minerals (SUPER THERA PREET M) TABS     nadolol (CORGARD) 20 MG tablet     omeprazole (PRILOSEC) 40 MG DR capsule     No current facility-administered medications for this visit.     No OTCs, herbals    Allergies:  Allergies   Allergen Reactions     Influenza Virus Vaccine H5n1 Anaphylaxis     twice       Iodine Anaphylaxis, Hives and Nausea and Vomiting     Patient was given [id number] IV contrast for CT scan. Patient had immediate contrast reaction, symptoms include anaphylaxis, vomiting, hives, swollen lips and  "tongue. Patient was given 0.3mg Epi pen and 50 mg diphenhydramine via IV. Patient continued to have hives and vomiting, swollen lips and tongue, sent to ER. Per radiologist patient MUST be premedicated and scanned in a hospital setting due to reaction.    KM     Contrast Dye        Objective:  /80   Pulse 66   Ht 1.822 m (5' 11.75\")   Wt 84.4 kg (186 lb)   BMI 25.40 kg/m    Constitutional: pleasant [unfilled] in NAD  Eyes: non icteric  Respiratory: Normal respiratory excursion   MSK: normal range of motion of visualized extremities  Abd: Distended with 2 cm umbilical hernia with overlying irritation, no signs of cellulitis.  Skin: No jaundice  Psychiatric: normal mood and orientation    Labs:  Last Comprehensive Metabolic Panel:  Sodium   Date Value Ref Range Status   10/12/2020 139 133 - 144 mmol/L Final     Potassium   Date Value Ref Range Status   10/12/2020 4.2 3.4 - 5.3 mmol/L Final     Chloride   Date Value Ref Range Status   10/12/2020 108 94 - 109 mmol/L Final     Carbon Dioxide   Date Value Ref Range Status   10/12/2020 26 20 - 32 mmol/L Final     Anion Gap   Date Value Ref Range Status   10/12/2020 6 3 - 14 mmol/L Final     Glucose   Date Value Ref Range Status   10/12/2020 127 (H) 70 - 99 mg/dL Final     Urea Nitrogen   Date Value Ref Range Status   10/12/2020 14 7 - 30 mg/dL Final     Creatinine   Date Value Ref Range Status   10/12/2020 0.92 0.66 - 1.25 mg/dL Final     GFR Estimate   Date Value Ref Range Status   10/12/2020 >90 >60 mL/min/[1.73_m2] Final     Comment:     Non  GFR Calc  Starting 12/18/2018, serum creatinine based estimated GFR (eGFR) will be   calculated using the Chronic Kidney Disease Epidemiology Collaboration   (CKD-EPI) equation.       Calcium   Date Value Ref Range Status   10/12/2020 8.2 (L) 8.5 - 10.1 mg/dL Final     Bilirubin Total   Date Value Ref Range Status   10/12/2020 0.6 0.2 - 1.3 mg/dL Final     Alkaline Phosphatase   Date Value Ref Range Status "   10/12/2020 65 40 - 150 U/L Final     ALT   Date Value Ref Range Status   10/12/2020 39 0 - 70 U/L Final     AST   Date Value Ref Range Status   10/12/2020 36 0 - 45 U/L Final       Lab Results   Component Value Date    WBC 7.4 10/12/2020     Lab Results   Component Value Date    RBC 2.88 10/12/2020     Lab Results   Component Value Date    HGB 8.5 10/12/2020     Lab Results   Component Value Date    HCT 27.5 10/12/2020     Lab Results   Component Value Date    MCV 96 10/12/2020     Lab Results   Component Value Date    MCH 29.5 10/12/2020     Lab Results   Component Value Date    MCHC 30.9 10/12/2020     Lab Results   Component Value Date    RDW 14.9 10/12/2020     Lab Results   Component Value Date    PLT 85 10/12/2020       INR   Date Value Ref Range Status   10/11/2020 1.16 (H) 0.86 - 1.14 Final        Computed MELD 3.0 unavailable. Necessary lab results were not found in the last year.  Computed MELD-Na unavailable. Necessary lab results were not found in the last year.      Imaging:    CT AP 9/2023    1.  Ascites extends into an umbilical hernia sac.   2.  Cirrhosis with portal hypertension.   3.  The stomach is largely collapsed, but does appear thick walled. The distal esophagus also appears thick-walled. Consider gastritis/esophagitis as well as other processes.   4.  Cholelithiasis.   5.  Colonic diverticulosis.   6.  Stable abdominal and retroperitoneal adenopathy.       Endoscopy:    EGD 10/2020:    Impression:          - Bleeding large (> 5 mm) esophageal varices. Completely                        eradicated. Bandedx3.                        - Clotted blood in the gastric fundus and in the gastric                        body.                        - No specimens collected.     Independently reviewed labs and imaging.     Assessment/Plan: Mr. Bentley is a 58-year-old male with a history of alcohol use disorder in early remission (September 2023), alcohol-related liver disease complicated by variceal  bleeding in 2020 and ascites.  It appears he first developed ascites October 2022 when he saw a surgeon for his umbilical hernia.  He has had several CT scans that have shown moderate ascites.  He has never had a paracentesis.    MELD Na 17  MELD 12    Discussed the natural history of alcohol-related liver disease.  Discussed that abstinence from alcohol is the most important thing he can do.  He has been sober about a month.  Often patients with alcohol-related liver disease will have improvement in their liver function as they are sober.  Has never had a paracentesis, we will get him set up for a diagnostic and therapeutic paracentesis.  We will also place orders for weekly standing paracentesis.  Depending on how quickly his fluid accumulates, would consider starting diuretics.  He is on nadolol which typically is stopped with refractory ascites, although his blood pressure is high normal so we will continue for now.  Discussed recommendation for low-salt diet.  Discussed his need for follow-up EGD to follow-up his varices and also the esophagitis and potential gastritis seen on CT scan.  Discussed the need for liver cancer screening and long-term follow-up for his liver disease.  Discussed that he is not currently a transplant candidate given his known liver disease and continued alcohol use.  If his ascites continues to be a problem he could be a TIPS candidate.    Orders Placed This Encounter   Procedures     Adult GI  Referral - Procedure Only     RTC 4 months.    Bee Watkins MD MS  Hepatology/Liver Transplant  [facility]    Approximately 25 minutes was spent for the visit with 35 minutes of non face-to-face time were spent in review of the patient's medical record on the day of the visit. This included review of previous: clinic visits, hospital records, lab results, imaging studies, and documentation.  The findings from this review are summarized in the above note.        Answers  submitted by the patient for this visit:  Patient Health Questionnaire (Submitted on 10/10/2023)  If you checked off any problems, how difficult have these problems made it for you to do your work, take care of things at home, or get along with other people?: Somewhat difficult  PHQ9 TOTAL SCORE: 9

## 2023-10-10 NOTE — LETTER
10/10/2023         RE: Federico Bentley  [address] [place] [address]        Dear Colleague,    Thank you for referring your patient, Federico Bentley, to the [facility] [place]. Please see a copy of my visit note below.    [facility] Clinic New Patient Visit    Date of Visit: October 10, 2023    Reason for referral: alcohol related liver disease    Subjective: Mr. Bentley is a 58 year old man with history of alcohol use disorder, alcohol-related liver disease with variceal bleeding, presents for evaluation of his liver disease and new onset ascites.    No previous known liver disease, abnormal LFTs, imaging tests. No known history of liver decompensation    He had a CT scan September 18 that showed ascites extending into his umbilical hernia.  Was given oral antibiotics for concern for cellulitis around his hernia.  Also showed cirrhosis with portal hypertension.  Concern for gastritis and esophagitis seen on his CT scan.  Ascites was first seen on imaging July 2023.  He has never had a paracentesis.  Is not on diuretics.  Has been losing weight in terms of muscle mass over the past year.  Is having a hard time eating.  Hernia is uncomfortable due to distention.    Labs from September 18 showed total bilirubin 2.6, liver enzymes are otherwise normal. INR was 1.2.  Sodium 132, creatinine 0.91.  Last drink was around September 17.  Was sober for 5 months after variceal bleeding in the past.  Has not done any alcohol counseling or treatment    Labs from July 4 showed total bilirubin 2.1, .  Labs from September 2022 showed total bilirubin 1.6, AST 64.    He saw surgeon October 2022 for his worsening hernia, they were concerned it was worsening due to ascites.  Recommended he follow-up with GI.    He was admitted to the hospital December 2020 for GI bleeding related to variceal bleeding.  Underwent banding.  At that time he had perihepatic ascites  on imaging.  Followed up with [facility] for follow-up endoscopies with banding.  Last EGD was somewhere in 2021.  Does not think he was seen in the clinic.    Taking omeprazole 20 mg daily for GERD    ROS: 14 point ROS negative except for positives noted in HPI.    PMHx:  AUD in early remission  ARLD with variceal bleeding and ascites  GERD  DM -better controlled with weight loss due to cirrhosis    PSHx:  Past Surgical History:   Procedure Laterality Date    ESOPHAGOSCOPY, GASTROSCOPY, DUODENOSCOPY (EGD), COMBINED N/A 10/9/2020    Procedure: ESOPHAGOGASTRODUODENOSCOPY (EGD);  Surgeon: Mary Wheatley DO;  Location: [facility]       FamHx:  No family history of liver disease, liver cancer    SocHx:  Social History     Socioeconomic History    Marital status:      Spouse name: Not on file    Number of children: Not on file    Years of education: Not on file    Highest education level: Not on file   Occupational History    Not on file   Tobacco Use    Smoking status: Every Day     Types: Cigarettes    Smokeless tobacco: Never   Substance and Sexual Activity    Alcohol use: Not on file    Drug use: Not on file    Sexual activity: Not on file   Other Topics Concern    Not on file   Social History Narrative    Not on file     Social Determinants of Health     Financial Resource Strain: Not on file   Food Insecurity: Not on file   Transportation Needs: Not on file   Physical Activity: Not on file   Stress: Not on file   Social Connections: Not on file   Interpersonal Safety: Not on file   Housing Stability: Not on file       Medications:  Current Outpatient Medications   Medication    metFORMIN (GLUCOPHAGE) 1000 MG tablet    Multiple Vitamins-Minerals (SUPER THERA PREET M) TABS    nadolol (CORGARD) 20 MG tablet    omeprazole (PRILOSEC) 40 MG DR capsule     No current facility-administered medications for this visit.     No OTCs, herbals    Allergies:  Allergies   Allergen Reactions    Influenza Virus Vaccine H5n1  "Anaphylaxis     twice      Iodine Anaphylaxis, Hives and Nausea and Vomiting     Patient was given [id number] IV contrast for CT scan. Patient had immediate contrast reaction, symptoms include anaphylaxis, vomiting, hives, swollen lips and tongue. Patient was given 0.3mg Epi pen and 50 mg diphenhydramine via IV. Patient continued to have hives and vomiting, swollen lips and tongue, sent to ER. Per radiologist patient MUST be premedicated and scanned in a hospital setting due to reaction.    KM    Contrast Dye        Objective:  /80   Pulse 66   Ht 1.822 m (5' 11.75\")   Wt 84.4 kg (186 lb)   BMI 25.40 kg/m    Constitutional: pleasant [unfilled] in NAD  Eyes: non icteric  Respiratory: Normal respiratory excursion   MSK: normal range of motion of visualized extremities  Abd: Distended with 2 cm umbilical hernia with overlying irritation, no signs of cellulitis.  Skin: No jaundice  Psychiatric: normal mood and orientation    Labs:  Last Comprehensive Metabolic Panel:  Sodium   Date Value Ref Range Status   10/12/2020 139 133 - 144 mmol/L Final     Potassium   Date Value Ref Range Status   10/12/2020 4.2 3.4 - 5.3 mmol/L Final     Chloride   Date Value Ref Range Status   10/12/2020 108 94 - 109 mmol/L Final     Carbon Dioxide   Date Value Ref Range Status   10/12/2020 26 20 - 32 mmol/L Final     Anion Gap   Date Value Ref Range Status   10/12/2020 6 3 - 14 mmol/L Final     Glucose   Date Value Ref Range Status   10/12/2020 127 (H) 70 - 99 mg/dL Final     Urea Nitrogen   Date Value Ref Range Status   10/12/2020 14 7 - 30 mg/dL Final     Creatinine   Date Value Ref Range Status   10/12/2020 0.92 0.66 - 1.25 mg/dL Final     GFR Estimate   Date Value Ref Range Status   10/12/2020 >90 >60 mL/min/[1.73_m2] Final     Comment:     Non  GFR Calc  Starting 12/18/2018, serum creatinine based estimated GFR (eGFR) will be   calculated using the Chronic Kidney Disease Epidemiology Collaboration   (CKD-EPI) " equation.       Calcium   Date Value Ref Range Status   10/12/2020 8.2 (L) 8.5 - 10.1 mg/dL Final     Bilirubin Total   Date Value Ref Range Status   10/12/2020 0.6 0.2 - 1.3 mg/dL Final     Alkaline Phosphatase   Date Value Ref Range Status   10/12/2020 65 40 - 150 U/L Final     ALT   Date Value Ref Range Status   10/12/2020 39 0 - 70 U/L Final     AST   Date Value Ref Range Status   10/12/2020 36 0 - 45 U/L Final       Lab Results   Component Value Date    WBC 7.4 10/12/2020     Lab Results   Component Value Date    RBC 2.88 10/12/2020     Lab Results   Component Value Date    HGB 8.5 10/12/2020     Lab Results   Component Value Date    HCT 27.5 10/12/2020     Lab Results   Component Value Date    MCV 96 10/12/2020     Lab Results   Component Value Date    MCH 29.5 10/12/2020     Lab Results   Component Value Date    MCHC 30.9 10/12/2020     Lab Results   Component Value Date    RDW 14.9 10/12/2020     Lab Results   Component Value Date    PLT 85 10/12/2020       INR   Date Value Ref Range Status   10/11/2020 1.16 (H) 0.86 - 1.14 Final        Computed MELD 3.0 unavailable. Necessary lab results were not found in the last year.  Computed MELD-Na unavailable. Necessary lab results were not found in the last year.      Imaging:    CT AP 9/2023    1.  Ascites extends into an umbilical hernia sac.   2.  Cirrhosis with portal hypertension.   3.  The stomach is largely collapsed, but does appear thick walled. The distal esophagus also appears thick-walled. Consider gastritis/esophagitis as well as other processes.   4.  Cholelithiasis.   5.  Colonic diverticulosis.   6.  Stable abdominal and retroperitoneal adenopathy.       Endoscopy:    EGD 10/2020:    Impression:          - Bleeding large (> 5 mm) esophageal varices. Completely                        eradicated. Bandedx3.                        - Clotted blood in the gastric fundus and in the gastric                        body.                        - No specimens  collected.     Independently reviewed labs and imaging.     Assessment/Plan: Mr. Bentley is a 58-year-old male with a history of alcohol use disorder in early remission (September 2023), alcohol-related liver disease complicated by variceal bleeding in 2020 and ascites.  It appears he first developed ascites October 2022 when he saw a surgeon for his umbilical hernia.  He has had several CT scans that have shown moderate ascites.  He has never had a paracentesis.    MELD Na 17  MELD 12    Discussed the natural history of alcohol-related liver disease.  Discussed that abstinence from alcohol is the most important thing he can do.  He has been sober about a month.  Often patients with alcohol-related liver disease will have improvement in their liver function as they are sober.  Has never had a paracentesis, we will get him set up for a diagnostic and therapeutic paracentesis.  We will also place orders for weekly standing paracentesis.  Depending on how quickly his fluid accumulates, would consider starting diuretics.  He is on nadolol which typically is stopped with refractory ascites, although his blood pressure is high normal so we will continue for now.  Discussed recommendation for low-salt diet.  Discussed his need for follow-up EGD to follow-up his varices and also the esophagitis and potential gastritis seen on CT scan.  Discussed the need for liver cancer screening and long-term follow-up for his liver disease.  Discussed that he is not currently a transplant candidate given his known liver disease and continued alcohol use.  If his ascites continues to be a problem he could be a TIPS candidate.    Orders Placed This Encounter   Procedures    Adult GI  Referral - Procedure Only     RTC 4 months.    Bee Watkins MD MS  Hepatology/Liver Transplant  [facility]    Approximately 25 minutes was spent for the visit with 35 minutes of non face-to-face time were spent in review of the patient's  medical record on the day of the visit. This included review of previous: clinic visits, hospital records, lab results, imaging studies, and documentation.  The findings from this review are summarized in the above note.        Answers submitted by the patient for this visit:  Patient Health Questionnaire (Submitted on 10/10/2023)  If you checked off any problems, how difficult have these problems made it for you to do your work, take care of things at home, or get along with other people?: Somewhat difficult  PHQ9 TOTAL SCORE: 9        Again, thank you for allowing me to participate in the care of your patient.        Sincerely,        Bee Watkins MD

## 2023-10-10 NOTE — CONFIDENTIAL NOTE
DIAGNOSIS: Chronic liver failure without hepatic coma    Appt Date:  10.10.2023    NOTES STATUS DETAILS   OFFICE NOTE from referring provider Internal 09.21.2023 Remington Valencia,     OFFICE NOTES from other specialists     DISCHARGE SUMMARY from hospital Care Everywhere 09.18.2023 [facility]    01.09.2023 [facility]  - [facility]   MEDICATION LIST Care Everywhere / Internal    LIVER BIOSPY (IF APPLICABLE)      PATHOLOGY REPORTS      IMAGING     ENDOSCOPY (IF AVAILABLE) Internal 10.09.2020   COLONOSCOPY (IF AVAILABLE)     ULTRASOUND LIVER Care Everywhere 07.05.2023 US Abd Upper [facility]    01.09.2023 US Abd Limited [facility]   CT OF ABDOMEN Care Everywhere 09.18.2023, 07.07.2023  CT Abd Pelvis [facility]    01.09.2023 CT Abd [facility]   MRI OF LIVER     FIBROSCAN, US ELASTOGRAPHY, FIBROSIS SCAN, MR ELASTOGRAPHY     LABS     HEPATIC PANEL (LIVER PANEL) Care Everywhere 09.18.2023    BASIC METABOLIC PANEL Care Everywhere 09.18.2023   COMPLETE METABOLIC PANEL Care Everywhere 09.18.2023   COMPLETE BLOOD COUNT (CBC) Care Everywhere 09.18.2023   INTERNATIONAL NORMALIZED RATIO (INR) Care Everywhere 09.18.2023   HEPATITIS C ANTIBODY     HEPATITIS C VIRAL LOAD/PCR     HEPATITIS C GENOTYPE     HEPATITIS B SURFACE ANTIGEN     HEPATITIS B SURFACE ANTIBODY     HEPATITIS B DNA QUANT LEVEL     HEPATITIS B CORE ANTIBODY       Action 10.10.2023 RM   Action Taken Called [facility] to get images pushed called [facility] to get images pushed received images from [facility], still waiting on [facility]'s images       Action 10.10.2023 RM 12:02   Action Taken All images received and uploaded to chart

## 2023-10-10 NOTE — NURSING NOTE
"Chief Complaint   Patient presents with    Establish Care     Chronic Liver Failure       /80   Pulse 66   Ht 1.822 m (5' 11.75\")   Wt 84.4 kg (186 lb)   BMI 25.40 kg/m    Margaret Chamberlain CMA on 10/10/2023 at 12:24 PM    "

## 2023-10-11 ENCOUNTER — PATIENT OUTREACH (OUTPATIENT)
Dept: GASTROENTEROLOGY | Facility: CLINIC | Age: 58
End: 2023-10-11
Payer: COMMERCIAL

## 2023-10-11 NOTE — PROGRESS NOTES
Spoke with pt for initiation of liver care coordination. Pt with history of alcoholic cirrhosis (last drink ~9/14/23) complicated by ascites and variceal bleeding (2020). Has never had a paracentesis, is now reporting significant abdominal distention since ~3-4 weeks ago. Scheduled pt for diagnostic and therapeutic paracentesis at [facility] on Friday 10/13, weekly standing therapeutic para orders sent as well.  Denies any recent hematemesis, hematochezia, or melena. Denies any itching or jaundice. Denies any mental fogginess or overt confusion.  Per Dr. Watkins, if fluid reaccumulates quickly after para will consider starting diuretic. May be TIPS candidate in the future. Due for EGD now, RTC in 4 months (02/2024).  Will plan to check in with pt on Monday 10/16.    Salome Marie, RN Care Coordinator  [facility]  Hepatology Clinic/Specialty Program

## 2023-10-11 NOTE — PROGRESS NOTES
Attempted to reach patient for initiation of liver care coordination, no answer, message left requesting call back, number provided.    Salome Marie, RN Care Coordinator  [facility]  Hepatology Clinic/Specialty Program

## 2023-10-13 ENCOUNTER — TELEPHONE (OUTPATIENT)
Dept: GASTROENTEROLOGY | Facility: CLINIC | Age: 58
End: 2023-10-13
Payer: COMMERCIAL

## 2023-10-13 NOTE — TELEPHONE ENCOUNTER
LUIZA and sent [facility] to schedule 4 month follow up around 2.10.24 with Dr. Bee Watkins// 10.13.23 KET

## 2023-10-18 ENCOUNTER — PATIENT OUTREACH (OUTPATIENT)
Dept: GASTROENTEROLOGY | Facility: CLINIC | Age: 58
End: 2023-10-18
Payer: COMMERCIAL

## 2023-10-18 ENCOUNTER — TELEPHONE (OUTPATIENT)
Dept: GASTROENTEROLOGY | Facility: CLINIC | Age: 58
End: 2023-10-18
Payer: COMMERCIAL

## 2023-10-18 NOTE — TELEPHONE ENCOUNTER
"Endoscopy Scheduling Screen    Have you had a positive Covid test in the last 14 days?  No    Are you active on MyChart?   Yes    What insurance is in the chart?  Other:  [facility]    Ordering/Referring Provider: RABIA NOLAN   (If ordering provider performs procedure, schedule with ordering provider unless otherwise instructed. )    BMI: Estimated body mass index is 25.4 kg/m  as calculated from the following:    Height as of 10/10/23: 1.822 m (5' 11.75\").    Weight as of 10/10/23: 84.4 kg (186 lb).     Sedation Ordered  MAC/deep sedation.   BMI<= 45 45 < BMI <= 48 48 < BMI < = 50  BMI > 50   No Restrictions No [facility]  No [facility]  [facility] with exceptions Hospital Only OR Only       Are you taking any prescription medications for pain 3 or more times per week?   No    Do you have a history of malignant hyperthermia or adverse reaction to anesthesia?  No    (Females) Are you currently pregnant?   No     Have you been diagnosed or told you have pulmonary hypertension?   No    Do you have an LVAD?  No    Have you been told you have moderate to severe sleep apnea?  No    Have you been told you have COPD, asthma, or any other lung disease?  No    Do you have any heart conditions?  No     Have you ever had an organ transplant?   No    Have you ever had or are you awaiting a heart or lung transplant?   No    Have you had a stroke or transient ischemic attack (TIA aka \"mini stroke\" in the last 6 months?   No    Have you been diagnosed with or been told you have cirrhosis of the liver?   Yes (RN Review required for scheduling unless scheduling in Hospital.)    Are you currently on dialysis?   No    Do you need assistance transferring?   No    BMI: Estimated body mass index is 25.4 kg/m  as calculated from the following:    Height as of 10/10/23: 1.822 m (5' 11.75\").    Weight as of 10/10/23: 84.4 kg (186 lb).     Is patients BMI > 40 and scheduling location [facility]?  No    Do you take an injectable medication for weight loss " or diabetes (excluding insulin)?  No    Do you take the medication Naltrexone?  No    Do you take blood thinners?  No       Prep   Are you currently on dialysis or do you have chronic kidney disease?  No    Do you have a diagnosis of diabetes?  Yes (Golytely Prep)    Do you have a diagnosis of cystic fibrosis (CF)?  No    On a regular basis do you go 3 -5 days between bowel movements?  No    BMI > 40?  No    Preferred Pharmacy:    [id number] - [place], [place] - [address]  [address]  [place] [address]  Phone: [phone] Fax: [phone]      Final Scheduling Details   Colonoscopy prep sent?  N/A    Procedure scheduled  Upper endoscopy (EGD)    Surgeon:  SHERLY CHANG     Date of procedure:  1/25/24     Pre-OP / PAC:   No - Not required for this site.    Location  [facility] - Per RN assessment.    Sedation   MAC/Deep Sedation - Per order.      Patient Reminders:   You will receive a call from a Nurse to review instructions and health history.  This assessment must be completed prior to your procedure.  Failure to complete the Nurse assessment may result in the procedure being cancelled.      On the day of your procedure, please designate an adult(s) who can drive you home stay with you for the next 24 hours. The medicines used in the exam will make you sleepy. You will not be able to drive.      You cannot take public transportation, ride share services, or non-medical taxi service without a responsible caregiver.  Medical transport services are allowed with the requirement that a responsible caregiver will receive you at your destination.  We require that drivers and caregivers are confirmed prior to your procedure.

## 2023-10-18 NOTE — TELEPHONE ENCOUNTER
Pre Assessment RN Review    Focused Assessments      Cirrhosis Assessment    No results found for requested labs within last 90 days.       Computed MELD 3.0 unavailable. One or more values for this score either were not found within the given timeframe or did not fit some other criterion.  Computed MELD-Na unavailable. One or more values for this score either were not found within the given timeframe or did not fit some other criterion.      INR <= 2.0*  and  Hgb >= 9 g/dL  and  Plt >= 50 10^9/L INR > 2.0   OR  Hgb < 9 g/dL   OR  Plt < 50 10^9/L   No Scheduling Restrictions Hospital Only   *Exception for patients on anticoagulation therapy.    Hx of variceal bleeding? Yes. (do not schedule upper endoscopy at )    Paracentesis in the last month? Yes (Hospital Only) Had paracentesis on 10/13/23.       Scheduling Status & Recommendations    Location Type: Hospital - Per exclusion criteria.

## 2023-10-18 NOTE — PROGRESS NOTES
"Spoke with pt for check in. He had first paracentesis on Friday 10/13 with 5.5 L output, endorses that he felt \"much better\" for several days but now feels like he's \"starting to fill up again\". Provided pt with [facility]'s imaging scheduling number, pt states he will get scheduled for several weekly paracentesis appts. Pt denies leg swelling at this time. Denies any melena, hematochezia, or hematemesis. Denies any fever, sweats, or chills. Discussed with pt that he is due for EGD for variceal screening, he states he will get that scheduled ASAP.  Per Dr. Watkins, depending on how much fluid removed with next para will consider starting diuretics. Will check in with pt in one week.    Salome Marie, RN Care Coordinator  [facility]  Hepatology Clinic/Specialty Program    "

## 2023-10-31 ENCOUNTER — PATIENT OUTREACH (OUTPATIENT)
Dept: GASTROENTEROLOGY | Facility: CLINIC | Age: 58
End: 2023-10-31
Payer: COMMERCIAL

## 2023-10-31 DIAGNOSIS — K70.31 ALCOHOLIC CIRRHOSIS OF LIVER WITH ASCITES (H): Primary | ICD-10-CM

## 2023-10-31 RX ORDER — SPIRONOLACTONE 25 MG/1
50 TABLET ORAL DAILY
Qty: 180 TABLET | Refills: 1 | Status: ON HOLD | OUTPATIENT
Start: 2023-10-31 | End: 2023-12-11

## 2023-10-31 RX ORDER — FUROSEMIDE 20 MG
20 TABLET ORAL DAILY
Qty: 90 TABLET | Refills: 1 | Status: ON HOLD | OUTPATIENT
Start: 2023-10-31 | End: 2023-12-11

## 2023-10-31 NOTE — PROGRESS NOTES
"Spoke with pt for check in. Pt is now having weekly paracentesis appts at [facility], last para on 10/27 with 3.4 L output. Pt endorses that his ascites \"doesn't seem to be getting better\" and today he feels like he's \"already filling up\" after having his last para 4 days ago. Pt is scheduled for his next para on Friday 11/3. Per Dr. Watkins, OK for pt to start furosemide 20mg daily and spironolactone 50mg daily with BMP check in 3-5 days.  Pt denies any melena, hematochezia, or hematemesis. Denies any fever, chills, or sweats.  Will plan to check in with pt next week.    Salome Marie RN Care Coordinator  [facility]  Hepatology Clinic/Specialty Program   "

## 2023-11-13 ENCOUNTER — TELEPHONE (OUTPATIENT)
Dept: GASTROENTEROLOGY | Facility: CLINIC | Age: 58
End: 2023-11-13
Payer: COMMERCIAL

## 2023-11-13 NOTE — TELEPHONE ENCOUNTER
"[facility] Call Center    Phone Message    May a detailed message be left on voicemail: yes     Reason for Call: Other: RE: Paracentesis orders.  Markus at [facility] has been calling quite frequently to get these orders corrected from Dr. Bee Watkins.  She has a start and end date of 10/11/23 with \"weekly as needed\" and they were signed; however, the end date should be 10/11/24.  Please refax corrected orders to: [phone] ATTN: MARKUS ...ASAP!  Any questions, please call Markus at [phone].     Action Taken: Message routed to:  [email or web address]] Hepatology [facility]    Travel Screening: Not Applicable                                                                    "

## 2023-11-13 NOTE — TELEPHONE ENCOUNTER
Attempted to call Zoya back to clarify request - this is the hepatology clinic's first time receiving a call for paracentesis order update request. When paracentesis orders had been initially sent they had to be faxed multiple times due to update to [facility]'s paper order set, need clarification of where on the paper order set this error needs to be corrected. No answer, left message requesting callback, number provided.    Salome Marie, RN Care Coordinator  [facility]  Hepatology Clinic/Specialty Program

## 2023-11-15 NOTE — TELEPHONE ENCOUNTER
Order corrected, initialed and dated by Dr. Watkins. Faxed to [phone] ATT: Joaquina Marie, RN Care Coordinator  [facility]  Hepatology Clinic/Specialty Program

## 2023-11-20 ENCOUNTER — TELEPHONE (OUTPATIENT)
Dept: GASTROENTEROLOGY | Facility: CLINIC | Age: 58
End: 2023-11-20
Payer: COMMERCIAL

## 2023-11-20 ENCOUNTER — MYC MEDICAL ADVICE (OUTPATIENT)
Dept: GASTROENTEROLOGY | Facility: CLINIC | Age: 58
End: 2023-11-20
Payer: COMMERCIAL

## 2023-11-20 NOTE — TELEPHONE ENCOUNTER
Message sent to provider regarding lab results and pt's current hospitalization/today's EGD. Per Care Everywhere, pt is currently admitted to [facility] ICU for GI bleed.    Salome Marie, RN Care Coordinator  [facility]  Hepatology Clinic/Specialty Program

## 2023-11-20 NOTE — TELEPHONE ENCOUNTER
[facility] Call Center    Phone Message    May a detailed message be left on voicemail: yes     Reason for Call: Other: Federico is calling in asking for a call back. Pt states that he would like to speak with RN Salome in regards to his care, as he would like to ensure that his labs were received and discuss a recent endoscopy he had to have done. Please call back as soon as possible to discuss.     Action Taken: Message routed to:  [facility] Hep    Travel Screening: Not Applicable

## 2023-11-21 ENCOUNTER — PATIENT OUTREACH (OUTPATIENT)
Dept: GASTROENTEROLOGY | Facility: CLINIC | Age: 58
End: 2023-11-21
Payer: COMMERCIAL

## 2023-11-21 DIAGNOSIS — K70.31 ALCOHOLIC CIRRHOSIS OF LIVER WITH ASCITES (H): Primary | ICD-10-CM

## 2023-11-21 NOTE — PROGRESS NOTES
Spoke with pt and spouse for check in. Pt states that over the weekend he started vomiting blood and experiencing black/bloody stools, was brought to [facility] via ambulance. He had an EGD with variceal banding on 11/20, the report recommends a follow-up EGD in 2-4 weeks. Pt already scheduled for EGD on 1/25/24 - message sent to Dr. Watkins about getting pt in for EGD sooner. Pt is currently still admitted to [facility]'s ICU, but states is feeling well and expects to be discharged home before Thanksgiving. Post hospital follow-up appt scheduled with Mary Ding PA-C on Monday 11/27. Per Dr. Watkins, pt should have MELD labs and PETH completed, orders entered and faxed to pt's local [facility] in [facility] with instructions to have completed on Monday morning. Pt and spouse verbalized understanding, in agreement with plan.  Discussed with pt that recent BMP check after starting lasix 20mg daily and spironolactone 50mg daily looked good. Pt endorses that his ascites has improved since starting diuretics, did not need paracentesis at his most recent para appt and does not feel like he needs one anytime soon. Is scheduled for para next Friday 12/1, states that after this appt he will start scheduling only as needed.  Denies any fever, chills, or sweats. Denies any jaundice or itching. Denies any mental fogginess or overt confusion. Will plan to check back in with pt in 1 week.    Salome Marie, RN Care Coordinator  [facility]  Hepatology Clinic/Specialty Program

## 2023-11-21 NOTE — TELEPHONE ENCOUNTER
Spoke with pt regarding post hospital follow-up - see patient outreach encounter.    Salome Marie, RN Care Coordinator  [facility]  Hepatology Clinic/Specialty Program

## 2023-11-27 ENCOUNTER — VIRTUAL VISIT (OUTPATIENT)
Dept: GASTROENTEROLOGY | Facility: CLINIC | Age: 58
End: 2023-11-27
Attending: PHYSICIAN ASSISTANT
Payer: COMMERCIAL

## 2023-11-27 DIAGNOSIS — I85.11 SECONDARY ESOPHAGEAL VARICES WITH BLEEDING (H): ICD-10-CM

## 2023-11-27 DIAGNOSIS — K76.6 PORTAL HYPERTENSION (H): ICD-10-CM

## 2023-11-27 DIAGNOSIS — K92.2 UGIB (UPPER GASTROINTESTINAL BLEED): ICD-10-CM

## 2023-11-27 DIAGNOSIS — K70.31 ALCOHOLIC CIRRHOSIS OF LIVER WITH ASCITES (H): ICD-10-CM

## 2023-11-27 DIAGNOSIS — I85.01 BLEEDING ESOPHAGEAL VARICES, UNSPECIFIED ESOPHAGEAL VARICES TYPE (H): ICD-10-CM

## 2023-11-27 DIAGNOSIS — K92.2 UGIB (UPPER GASTROINTESTINAL BLEED): Primary | ICD-10-CM

## 2023-11-27 DIAGNOSIS — K21.00 GASTROESOPHAGEAL REFLUX DISEASE WITH ESOPHAGITIS, UNSPECIFIED WHETHER HEMORRHAGE: Primary | ICD-10-CM

## 2023-11-27 PROBLEM — K70.30 ALCOHOLIC CIRRHOSIS (H): Status: ACTIVE | Noted: 2023-11-19

## 2023-11-27 PROBLEM — K57.32 DIVERTICULITIS OF COLON: Status: ACTIVE | Noted: 2023-11-27

## 2023-11-27 PROCEDURE — 99214 OFFICE O/P EST MOD 30 MIN: CPT | Mod: VID | Performed by: PHYSICIAN ASSISTANT

## 2023-11-27 RX ORDER — PANTOPRAZOLE SODIUM 40 MG/1
40 TABLET, DELAYED RELEASE ORAL
Qty: 120 TABLET | Refills: 0 | Status: ON HOLD | OUTPATIENT
Start: 2023-11-27 | End: 2023-12-11

## 2023-11-27 ASSESSMENT — PAIN SCALES - GENERAL: PAINLEVEL: MODERATE PAIN (4)

## 2023-11-27 NOTE — LETTER
11/27/2023         RE: Federico Bentley  [address]  [place] [address]        Dear Colleague,    Thank you for referring your patient, Federico Bentley, to the [facility] [place]. Please see a copy of my visit note below.    Hepatology Clinic Follow Up  Federico Bentley   Date of Birth 1965  Date of Service 11/27/2023    REASON FOR FOLLOW UP: decompensated alcohol related liver disease   COLLABORATING PROVIDER: Bee Watkins MD         Assessment/plan:     Mr. Bentley is a 57 YO M with hx of alcohol use disorder, alcohol-related liver dx with recent variceal bleeding/banding and ascites who presents for follow up regarding his liver disease and recent UGIB.      CT Sept 2023 showed ascites extending into umbilical hernia. Was given oral Abx for concern for cellulitis around hernia. Also showed cirrhosis with portal HTN as well as concerns of gastritis and esophagitis. Ascites initially seen on imaging July 2023. S/p 4 paracentesis. 5th attempted but not enough fluid to remove. Recently started on low dose diuretics and has noted improvement in abdominal distension. No LE edema. Losing wt in terms of muscle mass over the past year. Hernia is uncomfortable due to distention.    Recently hospitalized at [facility] for hematemesis. Underwent EGD Nov 2023 and had large esophageal varices banded. Currently taking PPI 40 mg twice daily. No further evidence of GI bleed since EGD.     Saw surgeon Oct 2022 for worsening hernia, they were concerned it was worsening due to ascites.  Recommended he follow-up with GI.    Last drink ~9/15/23. Was sober for 5 mo. after variceal bleeding in past. Has not completed any alcohol counseling or treatment.    MELD 3.0: 12  Ascites:   HCC: no; up to date on imaging   Varices: yes; banded via EGD 11/20/23 at [facility]  PETH: negative 11/20/23    - Placed referral to interventional radiology for consideration of TIPS   > Is scheduled for EGD Jan  "2024, but depending on whether or not he gets TIPS, consider canceling   - Have labs completed (previously ordered by Dr. Watkins)  - For now, continue with scheduled paracentesis as pt feels needed   - Per pt jacinta, sent in refill for pantoprazole 40 mg twice daily  - Continue to take spironolactone and Lasix as prescribed  -  He is on nadolol (typically stopped with refractory ascites), although BP is high/normal so continue for now  - Consume < 2000 mg sodium daily  - Continue to abstain from all alcohol consumption  - HCC screening every 6 mo  - Highly encouraged completing some form of chemical dependency treatment/counseling; mentioned [facility]  - Follow-up in clinic or virtually with myself or Dr. Watkins in 2 months  - Consider pt for transplant evaluation in the near future     Mary Ding PA-C  [facility]   -----------------------------------------------------         HPI:     Federico Bentley is a 58 year old male with a hx of alcohol use disorder in early remission (Sept 2023), alcohol-related liver disease c/b variceal bleeding in 2020 and 2023 as well as ascites. Appears he first developed ascites Oct 2022 when he saw a surgeon for umbilical hernia. Has had several CT scans that have shown moderate ascites. Pt is s/p 4 paracentesis; attempted a 5th on 11/10/23, but there was not enough fluid present.     Patient says he was recently admitted to [facility] after having 1 episode of vomiting up bright red blood at home.  Prior to this episode, he had been feeling \"terrible\" and took his BP, which was noted to be low. He was taken from his home via ambulance to the hospital. States he underwent EGD and had varices banded. Completed 5 days of ciprofloxacin as prescribed. Had been taking omeprazole but reports this was recently changed to pantoprazole 40 mg twice daily, which he takes before meals.    No recent hematochezia, hematemesis or melena since " being discharged.  Passing stool daily. No recent vomiting. Does admit to having some nausea today.  Also states he had some abdominal pain that started this morning but that improved throughout the day.  States it feels sharp and achy in nature.  Eating normally.  Drinking plenty of water.  States weight at home has been stable around 170/171 pounds.  Only checks his blood pressure at home if he is feeling off.  Has not noted yellowing eyes.  Wife states patient's skin has a yellow tinge off-and-on.  Denies lower extremity edema.  Is not currently taking any over-the-counter medications for pain/discomfort.    Has had 4 successful paracentesis.  States they attempted a 5th on November 10th, but states there was not enough fluid to remove.  Continues to take Lasix 20 mg and spironolactone 50 mg daily.  Patient admits he adheres to low-sodium diet.    Last drink of alcohol was Sept 15, 2023.  Denies any current cravings or urges to drink.  Admits he has not completed any form of chemical dependency counseling/treatment.    Health Maintenance:  Dexa scan: none noted per chart review   Hep A/B vaccines: Unsure   Colonoscopy: per chart review, last colonoscopy 2012/2013  HCC screening: up-to-date on imaging; no recent AFT per chart review   EGD: last EGD at [facility] 11/20/23: Large esophageal varices with stigmata of recent bleeding. Banded. Mild portal hypertensive gastropathy throughout stomach.     Medical hx Surgical hx   AUD in early remission  ARLD with variceal bleeding and ascites  GERD  DM -better controlled with weight loss due to cirrhosis Past Surgical History:   Procedure Laterality Date     ESOPHAGOSCOPY, GASTROSCOPY, DUODENOSCOPY (EGD), COMBINED N/A 10/9/2020    Procedure: ESOPHAGOGASTRODUODENOSCOPY (EGD);  Surgeon: Mary Wheatley DO;  Location:  GI                 Medications:     Current Outpatient Medications   Medication     furosemide (LASIX) 20 MG tablet     metFORMIN (GLUCOPHAGE) 1000 MG  tablet     Multiple Vitamins-Minerals (SUPER THERA PREET M) TABS     nadolol (CORGARD) 20 MG tablet     omeprazole (PRILOSEC) 40 MG DR capsule     spironolactone (ALDACTONE) 25 MG tablet     No current facility-administered medications for this visit.          Allergies:     Allergies   Allergen Reactions     Influenza Virus Vaccine H5n1 Anaphylaxis     twice       Iodine Anaphylaxis, Hives and Nausea and Vomiting     Patient was given [id number] IV contrast for CT scan. Patient had immediate contrast reaction, symptoms include anaphylaxis, vomiting, hives, swollen lips and tongue. Patient was given 0.3mg Epi pen and 50 mg diphenhydramine via IV. Patient continued to have hives and vomiting, swollen lips and tongue, sent to ER. Per radiologist patient MUST be premedicated and scanned in a hospital setting due to reaction.    KM     Contrast Dye           Social History:     Social History     Socioeconomic History     Marital status:      Spouse name: Not on file     Number of children: Not on file     Years of education: Not on file     Highest education level: Not on file   Occupational History     Not on file   Tobacco Use     Smoking status: Every Day     Types: Cigarettes     Smokeless tobacco: Never   Substance and Sexual Activity     Alcohol use: Not on file     Drug use: Not on file     Sexual activity: Not on file   Other Topics Concern     Not on file   Social History Narrative     Not on file     Social Determinants of Health     Financial Resource Strain: Not on file   Food Insecurity: Not on file   Transportation Needs: Not on file   Physical Activity: Not on file   Stress: Not on file   Social Connections: Not on file   Interpersonal Safety: Not on file   Housing Stability: Not on file          Family History:   No family history of liver disease, liver cancer          Review of Systems:    See HPI         Physical Exam:     VS:  There were no vitals taken for this visit.    Gen: A&Ox3,  "NAD  HEENT: non-icteric   Abd: hernia noted; abdomen appeared somewhat distended    Skin: No jaundice noted  Psych: appropriate mood and affects         Data:   Reviewed in person and significant for:    Lab Results   Component Value Date     10/12/2020      Lab Results   Component Value Date    POTASSIUM 4.2 10/12/2020     Lab Results   Component Value Date    CHLORIDE 108 10/12/2020     Lab Results   Component Value Date    CO2 26 10/12/2020     Lab Results   Component Value Date    BUN 14 10/12/2020     Lab Results   Component Value Date    CR 0.92 10/12/2020       Lab Results   Component Value Date    WBC 7.4 10/12/2020     Lab Results   Component Value Date    HGB 8.5 10/12/2020     Lab Results   Component Value Date    HCT 27.5 10/12/2020     Lab Results   Component Value Date    MCV 96 10/12/2020     Lab Results   Component Value Date    PLT 85 10/12/2020       Lab Results   Component Value Date    AST 36 10/12/2020     Lab Results   Component Value Date    ALT 39 10/12/2020     No results found for: \"BILICONJ\"   Lab Results   Component Value Date    BILITOTAL 0.6 10/12/2020       Lab Results   Component Value Date    ALBUMIN 3.1 10/12/2020     Lab Results   Component Value Date    PROTTOTAL 6.4 10/12/2020      Lab Results   Component Value Date    ALKPHOS 65 10/12/2020       Lab Results   Component Value Date    INR 1.16 10/11/2020     CT ABDOMEN & PELVIS W/O ORAL W/O IV CON 11/18/2023     CLINICAL: Pain.    COMPARISON: 9/18/2023.    FINDINGS: Cirrhotic appearing liver. Small ascites. Splenic enlargement. Portosystemic varices. Stones are present in the gallbladder. Right-sided renal cyst. No hydronephrosis on either side. Postsurgical changes in the rectum. There is an umbilical hernia which contains a loop of nonobstructed small bowel.       EGD 11/20/23:    Impressions/Post-Op Diagnosis:        - Large (> 5 mm) esophageal varices with stigmata of recent bleeding.        Completely eradicated. " Banded.        - There was mild portal hypertensive gastropathy throughout the entire        examined stomach. No bleeding soruces noted in the stomach. No gastric        varices noted.        - Normal examined duodenum.        - No specimens collected.     Recommendation:       - Return patient to ICU for ongoing care.        - Clear liquid diet today.        - Continue IV PPI BID for 48 hours then convert to PO PPI BID.        - Continue Octeotide gtt for total 72 hours.        - Continue Ceftriaxone or equivalent for 7 days for infectious        prophylaxis.        - If no further evidence of active bleeding, OK to advance to low sodium        diet tomorrow.        - GI will continue to follow.        - Repeat EGD in 2-4 weeks to ensure eradication of varices.   ________   Coby Rushing MD       Again, thank you for allowing me to participate in the care of your patient.        Sincerely,        Mary Ding PA-C

## 2023-11-27 NOTE — NURSING NOTE
Is the patient currently in the state of [place]? YES    Visit mode:VIDEO    If the visit is dropped, the patient can be reconnected by:  Provider joined before I could ask    Will anyone else be joining the visit? NO  (If patient encounters technical issues they should call [phone])    How would you like to obtain your AVS? MyChart    Are changes needed to the allergy or medication list? Pt stated no changes to allergies and Pt stated no med changes    Reason for visit: RECHECK    Unable to complete qnrs due to provider joined before rooming completed.    Deric MEYERF

## 2023-11-27 NOTE — PROGRESS NOTES
"Virtual Visit Details    Type of service:  Video Visit     Originating Location (pt. Location): {video visit patient location:962697::\"Home\"}  {PROVIDER LOCATION On-site should be selected for visits conducted from your clinic location or adjoining [facility] hospital, academic office, or other nearby [facility] building. Off-site should be selected for all other provider locations, including home:[id number]}  Distant Location (provider location):  {virtual location provider:015129}  Platform used for Video Visit: {Virtual Visit Platforms:627156::\"Loteda\"}  "

## 2023-11-27 NOTE — PROGRESS NOTES
Hepatology Clinic Follow Up  Federico Bentley   Date of Birth 1965  Date of Service 11/27/2023    REASON FOR FOLLOW UP: decompensated alcohol related liver disease   COLLABORATING PROVIDER: Bee Watkins MD         Assessment/plan:     Mr. Bentley is a 59 YO M with hx of alcohol use disorder, alcohol-related liver dx with recent variceal bleeding/banding and ascites who presents for follow up regarding his liver disease and recent UGIB.      CT Sept 2023 showed ascites extending into umbilical hernia. Was given oral Abx for concern for cellulitis around hernia. Also showed cirrhosis with portal HTN as well as concerns of gastritis and esophagitis. Ascites initially seen on imaging July 2023. S/p 4 paracentesis. 5th attempted but not enough fluid to remove. Recently started on low dose diuretics and has noted improvement in abdominal distension. No LE edema. Losing wt in terms of muscle mass over the past year. Hernia is uncomfortable due to distention.    Recently hospitalized at [facility] for hematemesis. Underwent EGD Nov 2023 and had large esophageal varices banded. Currently taking PPI 40 mg twice daily. No further evidence of GI bleed since EGD.     Saw surgeon Oct 2022 for worsening hernia, they were concerned it was worsening due to ascites.  Recommended he follow-up with GI.    Last drink ~9/15/23. Was sober for 5 mo. after variceal bleeding in past. Has not completed any alcohol counseling or treatment.    MELD 3.0: 12  Ascites:   HCC: no; up to date on imaging   Varices: yes; banded via EGD 11/20/23 at [facility]  PETH: negative 11/20/23    - Placed referral to interventional radiology for consideration of TIPS   > Is scheduled for EGD Jan 2024, but depending on whether or not he gets TIPS, consider canceling   - Have labs completed (previously ordered by Dr. Watkins)  - For now, continue with scheduled paracentesis as pt feels needed   - Per pt requinn, sent in refill for pantoprazole 40 mg twice daily  -  "Continue to take spironolactone and Lasix as prescribed  -  He is on nadolol (typically stopped with refractory ascites), although BP is high/normal so continue for now  - Consume < 2000 mg sodium daily  - Continue to abstain from all alcohol consumption  - HCC screening every 6 mo  - Highly encouraged completing some form of chemical dependency treatment/counseling; mentioned [facility]  - Follow-up in clinic or virtually with myself or Dr. Watkins in 2 months  - Consider pt for transplant evaluation in the near future     Mary Ding PA-C  [facility]   -----------------------------------------------------         HPI:     Federico Bentley is a 58 year old male with a hx of alcohol use disorder in early remission (Sept 2023), alcohol-related liver disease c/b variceal bleeding in 2020 and 2023 as well as ascites. Appears he first developed ascites Oct 2022 when he saw a surgeon for umbilical hernia. Has had several CT scans that have shown moderate ascites. Pt is s/p 4 paracentesis; attempted a 5th on 11/10/23, but there was not enough fluid present.     Patient says he was recently admitted to [facility] after having 1 episode of vomiting up bright red blood at home.  Prior to this episode, he had been feeling \"terrible\" and took his BP, which was noted to be low. He was taken from his home via ambulance to the hospital. States he underwent EGD and had varices banded. Completed 5 days of ciprofloxacin as prescribed. Had been taking omeprazole but reports this was recently changed to pantoprazole 40 mg twice daily, which he takes before meals.    No recent hematochezia, hematemesis or melena since being discharged.  Passing stool daily. No recent vomiting. Does admit to having some nausea today.  Also states he had some abdominal pain that started this morning but that improved throughout the day.  States it feels sharp and achy in nature.  Eating normally.  " Drinking plenty of water.  States weight at home has been stable around 170/171 pounds.  Only checks his blood pressure at home if he is feeling off.  Has not noted yellowing eyes.  Wife states patient's skin has a yellow tinge off-and-on.  Denies lower extremity edema.  Is not currently taking any over-the-counter medications for pain/discomfort.    Has had 4 successful paracentesis.  States they attempted a 5th on November 10th, but states there was not enough fluid to remove.  Continues to take Lasix 20 mg and spironolactone 50 mg daily.  Patient admits he adheres to low-sodium diet.    Last drink of alcohol was Sept 15, 2023.  Denies any current cravings or urges to drink.  Admits he has not completed any form of chemical dependency counseling/treatment.    Health Maintenance:  Dexa scan: none noted per chart review   Hep A/B vaccines: Unsure   Colonoscopy: per chart review, last colonoscopy 2012/2013  HCC screening: up-to-date on imaging; no recent AFT per chart review   EGD: last EGD at [facility] 11/20/23: Large esophageal varices with stigmata of recent bleeding. Banded. Mild portal hypertensive gastropathy throughout stomach.     Medical hx Surgical hx   AUD in early remission  ARLD with variceal bleeding and ascites  GERD  DM -better controlled with weight loss due to cirrhosis Past Surgical History:   Procedure Laterality Date    ESOPHAGOSCOPY, GASTROSCOPY, DUODENOSCOPY (EGD), COMBINED N/A 10/9/2020    Procedure: ESOPHAGOGASTRODUODENOSCOPY (EGD);  Surgeon: Mary Wheatley DO;  Location:  GI                 Medications:     Current Outpatient Medications   Medication    furosemide (LASIX) 20 MG tablet    metFORMIN (GLUCOPHAGE) 1000 MG tablet    Multiple Vitamins-Minerals (SUPER THERA PREET M) TABS    nadolol (CORGARD) 20 MG tablet    omeprazole (PRILOSEC) 40 MG DR capsule    spironolactone (ALDACTONE) 25 MG tablet     No current facility-administered medications for this visit.          Allergies:      Allergies   Allergen Reactions    Influenza Virus Vaccine H5n1 Anaphylaxis     twice      Iodine Anaphylaxis, Hives and Nausea and Vomiting     Patient was given [id number] IV contrast for CT scan. Patient had immediate contrast reaction, symptoms include anaphylaxis, vomiting, hives, swollen lips and tongue. Patient was given 0.3mg Epi pen and 50 mg diphenhydramine via IV. Patient continued to have hives and vomiting, swollen lips and tongue, sent to ER. Per radiologist patient MUST be premedicated and scanned in a hospital setting due to reaction.    KM    Contrast Dye           Social History:     Social History     Socioeconomic History    Marital status:      Spouse name: Not on file    Number of children: Not on file    Years of education: Not on file    Highest education level: Not on file   Occupational History    Not on file   Tobacco Use    Smoking status: Every Day     Types: Cigarettes    Smokeless tobacco: Never   Substance and Sexual Activity    Alcohol use: Not on file    Drug use: Not on file    Sexual activity: Not on file   Other Topics Concern    Not on file   Social History Narrative    Not on file     Social Determinants of Health     Financial Resource Strain: Not on file   Food Insecurity: Not on file   Transportation Needs: Not on file   Physical Activity: Not on file   Stress: Not on file   Social Connections: Not on file   Interpersonal Safety: Not on file   Housing Stability: Not on file          Family History:   No family history of liver disease, liver cancer          Review of Systems:    See HPI         Physical Exam:     VS:  There were no vitals taken for this visit.    Gen: A&Ox3, NAD  HEENT: non-icteric   Abd: hernia noted; abdomen appeared somewhat distended    Skin: No jaundice noted  Psych: appropriate mood and affects         Data:   Reviewed in person and significant for:    Lab Results   Component Value Date     10/12/2020      Lab Results   Component  "Value Date    POTASSIUM 4.2 10/12/2020     Lab Results   Component Value Date    CHLORIDE 108 10/12/2020     Lab Results   Component Value Date    CO2 26 10/12/2020     Lab Results   Component Value Date    BUN 14 10/12/2020     Lab Results   Component Value Date    CR 0.92 10/12/2020       Lab Results   Component Value Date    WBC 7.4 10/12/2020     Lab Results   Component Value Date    HGB 8.5 10/12/2020     Lab Results   Component Value Date    HCT 27.5 10/12/2020     Lab Results   Component Value Date    MCV 96 10/12/2020     Lab Results   Component Value Date    PLT 85 10/12/2020       Lab Results   Component Value Date    AST 36 10/12/2020     Lab Results   Component Value Date    ALT 39 10/12/2020     No results found for: \"BILICONJ\"   Lab Results   Component Value Date    BILITOTAL 0.6 10/12/2020       Lab Results   Component Value Date    ALBUMIN 3.1 10/12/2020     Lab Results   Component Value Date    PROTTOTAL 6.4 10/12/2020      Lab Results   Component Value Date    ALKPHOS 65 10/12/2020       Lab Results   Component Value Date    INR 1.16 10/11/2020     CT ABDOMEN & PELVIS W/O ORAL W/O IV CON 11/18/2023     CLINICAL: Pain.    COMPARISON: 9/18/2023.    FINDINGS: Cirrhotic appearing liver. Small ascites. Splenic enlargement. Portosystemic varices. Stones are present in the gallbladder. Right-sided renal cyst. No hydronephrosis on either side. Postsurgical changes in the rectum. There is an umbilical hernia which contains a loop of nonobstructed small bowel.       EGD 11/20/23:    Impressions/Post-Op Diagnosis:        - Large (> 5 mm) esophageal varices with stigmata of recent bleeding.        Completely eradicated. Banded.        - There was mild portal hypertensive gastropathy throughout the entire        examined stomach. No bleeding soruces noted in the stomach. No gastric        varices noted.        - Normal examined duodenum.        - No specimens collected.     Recommendation:       - Return " patient to ICU for ongoing care.        - Clear liquid diet today.        - Continue IV PPI BID for 48 hours then convert to PO PPI BID.        - Continue Octeotide gtt for total 72 hours.        - Continue Ceftriaxone or equivalent for 7 days for infectious        prophylaxis.        - If no further evidence of active bleeding, OK to advance to low sodium        diet tomorrow.        - GI will continue to follow.        - Repeat EGD in 2-4 weeks to ensure eradication of varices.   ________   Coby Rushing MD

## 2023-11-28 ENCOUNTER — DOCUMENTATION ONLY (OUTPATIENT)
Dept: VASCULAR SURGERY | Facility: CLINIC | Age: 58
End: 2023-11-28
Payer: COMMERCIAL

## 2023-11-28 ENCOUNTER — TELEPHONE (OUTPATIENT)
Dept: GASTROENTEROLOGY | Facility: CLINIC | Age: 58
End: 2023-11-28
Payer: COMMERCIAL

## 2023-11-28 ENCOUNTER — PATIENT OUTREACH (OUTPATIENT)
Dept: GASTROENTEROLOGY | Facility: CLINIC | Age: 58
End: 2023-11-28
Payer: COMMERCIAL

## 2023-11-28 DIAGNOSIS — K70.31 ALCOHOLIC CIRRHOSIS OF LIVER WITH ASCITES (H): ICD-10-CM

## 2023-11-28 DIAGNOSIS — Z01.810 ENCOUNTER FOR PREPROCEDURAL CARDIOVASCULAR EXAMINATION: Primary | ICD-10-CM

## 2023-11-28 NOTE — PROGRESS NOTES
Spoke with pt for check in. Discussed with pt that Dr. Watkins is willing to fit him in for EGD on 12/11 or 12/13 as he needs follow-up EGD within 2-4 weeks from EGD with banding after variceal bleed on 11/20. Pt verbalized understanding, states he will get scheduled for this. Pt had post-hospital follow-up visit with Mary Ding PA-C on 11/27, discussed TIPS placement for prevention of future variceal bleeds. Discussed TIPS eval process with pt, answered questions accordingly. Pt scheduled for TIPS consult on 12/4 with Dr. Arenas, instructions given to pt for scheduling TIPS eval ECHO.  Will check back in with pt next week.    Salome Marie, RN Care Coordinator  [facility]  Hepatology Clinic/Specialty Program

## 2023-11-28 NOTE — PROGRESS NOTES
Action 11.28.23 rui   Action Taken Per socrates wattsg from [facility] faxed urgent imaging request to [facility] at [phone] to have the CT Abd/Pelvis from 11.18.23 pushed to Pacs.    Imaging received and resolved to Pacs. Notified Kimberly

## 2023-11-30 ENCOUNTER — TELEPHONE (OUTPATIENT)
Dept: GASTROENTEROLOGY | Facility: CLINIC | Age: 58
End: 2023-11-30
Payer: COMMERCIAL

## 2023-11-30 NOTE — TELEPHONE ENCOUNTER
Pre assessment completed for upcoming procedure.   (Please see previous telephone encounter notes for complete details)      Procedure details:    Arrival time and facility location reviewed.    Pre op exam needed? N/A    Designated  policy reviewed. Instructed to have someone stay 6 hours post procedure.     COVID policy reviewed.      Medication review:    Oral diabetic medication(s): Metformin (glucophage): HOLD day of procedure.      Prep for procedure:     Procedure prep instructions reviewed.        Additional information needed?  N/A      Patient  verbalized understanding and had no questions or concerns at this time.      Bee Correa RN  Endoscopy Procedure Pre Assessment RN  [phone] option 4

## 2023-11-30 NOTE — TELEPHONE ENCOUNTER
Caller: Federico  Reason for Reschedule/Cancellation (please be detailed, any staff messages or encounters to note?): Per Dr. Watkins, wanted appt moved up to 12/11      Prior to reschedule please review:  Ordering Provider: Bee Watkins  Sedation per order: Moderate  Does patient have any ASC Exclusions, please identify?: Cirrhosis      Notes on Cancelled Procedure:  Procedure: Upper Endoscopy [EGD]   Date: 1/25/24  Location: [facility]; [address] [place] [address]  Surgeon: Jericho      Rescheduled: Yes  Procedure: Upper Endoscopy [EGD]   Date: 12/11/23  Location: [facility]; [address] [place] [address]  Surgeon: June  Sedation Level Scheduled  Moderate,  Reason for Sedation Level Order  Prep/Instructions updated and sent: Yes       Send In - basket message to Panc - Jon Pool if EUS  procedure is canceled or rescheduled: [ N/A, YES or NO] N/A

## 2023-11-30 NOTE — TELEPHONE ENCOUNTER
Pre visit planning completed.      Procedure details:    Patient scheduled for Upper endoscopy (EGD) on 12/11/2023.     Arrival time: 1030. Procedure time 1130    Pre op exam needed? N/A    Facility location: [facility]; [address], [place] [address]    Sedation type: Conscious sedation     Indication for procedure:   UGIB (upper gastrointestinal bleed) [K92.2]  - Primary      Alcoholic cirrhosis of liver with ascites (H) [K70.31]      Bleeding esophageal varices, unspecified esophageal varices type (H) [I85.01]            Chart review:     Electronic implanted devices? No    Recent diagnosis of diverticulitis within the last 6 weeks? N/A    Diabetic? Yes. See medication holding recommendations     Diabetic medication HOLDING recommendations: (if applicable)  Oral diabetic medications: Yes:  Metformin (glucophage): HOLD day of procedure.  Diabetic injectables: N/A  Insulin: N/A      Medication review:    Anticoagulants? No    NSAIDS? No NSAID medications per patient's medication list.  RN will verify with pre-assessment call.    Other medication HOLDING recommendations:  N/A      Prep for procedure:     Prep instructions sent via Zoombumariela Correa RN  Endoscopy Procedure Pre Assessment RN  [phone] option 4

## 2023-11-30 NOTE — TELEPHONE ENCOUNTER
LVM for patient to call back to move up procedure per the request of Salome Marie and Dr. Watkins.     If patient calls back they can be added onto Dr. Watkins's schedule on 12/11 near the other add on cases. This was ok'd by Dr. Watkins as well.     Ok'd per Dr. Watkins for CS as well.

## 2023-12-02 ENCOUNTER — HEALTH MAINTENANCE LETTER (OUTPATIENT)
Age: 58
End: 2023-12-02

## 2023-12-04 ENCOUNTER — ONCOLOGY VISIT (OUTPATIENT)
Dept: RADIOLOGY | Facility: CLINIC | Age: 58
End: 2023-12-04
Attending: STUDENT IN AN ORGANIZED HEALTH CARE EDUCATION/TRAINING PROGRAM
Payer: COMMERCIAL

## 2023-12-04 VITALS
BODY MASS INDEX: 24.43 KG/M2 | TEMPERATURE: 96.7 F | DIASTOLIC BLOOD PRESSURE: 66 MMHG | RESPIRATION RATE: 16 BRPM | HEART RATE: 73 BPM | WEIGHT: 178.9 LBS | OXYGEN SATURATION: 100 % | SYSTOLIC BLOOD PRESSURE: 112 MMHG

## 2023-12-04 DIAGNOSIS — K76.6 PORTAL HYPERTENSION (H): ICD-10-CM

## 2023-12-04 DIAGNOSIS — K92.2 UGIB (UPPER GASTROINTESTINAL BLEED): ICD-10-CM

## 2023-12-04 DIAGNOSIS — I85.11 SECONDARY ESOPHAGEAL VARICES WITH BLEEDING (H): ICD-10-CM

## 2023-12-04 DIAGNOSIS — K70.31 ALCOHOLIC CIRRHOSIS OF LIVER WITH ASCITES (H): ICD-10-CM

## 2023-12-04 PROCEDURE — 99204 OFFICE O/P NEW MOD 45 MIN: CPT | Mod: GC | Performed by: STUDENT IN AN ORGANIZED HEALTH CARE EDUCATION/TRAINING PROGRAM

## 2023-12-04 PROCEDURE — 99213 OFFICE O/P EST LOW 20 MIN: CPT | Performed by: STUDENT IN AN ORGANIZED HEALTH CARE EDUCATION/TRAINING PROGRAM

## 2023-12-04 ASSESSMENT — PAIN SCALES - GENERAL: PAINLEVEL: NO PAIN (0)

## 2023-12-04 NOTE — PROGRESS NOTES
Interventional Radiology Clinic Visit    Date of this visit: 12/4/2023    Federico Bentley  is referred by Dr. Watkins for treatment recommendations. The patient requires evaluation for possible TIPS.    Primary Physician: Clinic, [facility]        History Of Present Illness:    Federico Bentley is a 58 year old male with EtOH cirrhosis and complications related to portal hypertension including a history of variceal bleeding x2 and ascites. He is here today for TIPS consultation.     Prior episodes of hematemesis (10/20 and most recently 11/23) due to variceal bleeding were managed endoscopically. He has a follow up endoscopy scheduled on 12/11/23.     He also has ascites. Between 10/13 and 10/31 he underwent paracentesis x3 and with 3.4 - 5.5 L of fluid removed. He takes lasix and spironolactone. Since starting the lasix last month he has not required any further paracentesis. He has an umbilical hernia which he is hoping to get repaired and this cannot be done until has ascites is resolved.    His last ECHO was in 2020 without evidence of right heart dysfunction. He has an ECHO currently ordered but this has not been performed yet.No prior history of hepatic encephalopathy. No lower extremity edema.     Had a remote history of contrast reaction which was documented as anaphylactic. He required epinephrine for this. He has not received contrast since this reaction.    Review of Systems:    As above    Past Medical/Surgical History:    No past medical history on file.  Past Surgical History:   Procedure Laterality Date    ESOPHAGOSCOPY, GASTROSCOPY, DUODENOSCOPY (EGD), COMBINED N/A 10/9/2020    Procedure: ESOPHAGOGASTRODUODENOSCOPY (EGD);  Surgeon: Mary Wheatley DO;  Location:  GI       Current Medications:    Current Outpatient Medications   Medication Sig Dispense Refill    furosemide (LASIX) 20 MG tablet Take 1 tablet (20 mg) by mouth daily 90 tablet 1    metFORMIN (GLUCOPHAGE) 1000 MG tablet Take  "1,000 mg by mouth 2 times daily (with meals)      Multiple Vitamins-Minerals (SUPER THERA PREET M) TABS Take 1 tablet by mouth daily      nadolol (CORGARD) 20 MG tablet Take 1 tablet (20 mg) by mouth daily 30 tablet 0    pantoprazole (PROTONIX) 40 MG EC tablet Take 1 tablet (40 mg) by mouth 2 times daily (before meals) for 60 days 120 tablet 0    spironolactone (ALDACTONE) 25 MG tablet Take 2 tablets (50 mg) by mouth daily 180 tablet 1       Allergies:    Influenza virus vaccine h5n1, Iodine, and Contrast dye    Family History:    No family history on file.    Social History:    Last drink of alcohol on 9/15/23    Social History     Socioeconomic History    Marital status:    Tobacco Use    Smoking status: Every Day     Types: Cigarettes    Smokeless tobacco: Never   Vaping Use    Vaping Use: Never used       Physical Exam:    There were no vitals taken for this visit.   CONSTITUTIONAL: healthy, alert and no distress.  PSYCHIATRIC: mentation appears normal and affect normal.  NEURO: Normal movements and speech.  EYES: No jaundice or pallor.  SKIN: No jaundice.   RESP: No audible cough or wheeze.      Laboratory Studies:    Lab Results   Component Value Date    HGB 8.5 10/12/2020     Lab Results   Component Value Date    PLT 85 10/12/2020     Lab Results   Component Value Date    WBC 7.4 10/12/2020       Lab Results   Component Value Date    INR 1.16 10/11/2020       Lab Results   Component Value Date    PROTTOTAL 6.4 10/12/2020      Lab Results   Component Value Date    ALBUMIN 3.1 10/12/2020     Lab Results   Component Value Date    BILITOTAL 0.6 10/12/2020     No results found for: \"BILICONJ\"   Lab Results   Component Value Date    ALKPHOS 65 10/12/2020     Lab Results   Component Value Date    AST 36 10/12/2020     Lab Results   Component Value Date    ALT 39 10/12/2020       Lab Results   Component Value Date    CR 0.92 10/12/2020     Lab Results   Component Value Date    BUN 14 10/12/2020     Care " Everywhere Labs   11/19/23 T Bili 0.6  11/19/23 Creatinine 0.95  11/21/23 INR 1.2    Original MELD based on these values: 8    Imaging:   Outside abdominal ultrasound 7/4/23 - extrahepatic main portal vein is patent.    ASSESSMENT:      Federico Bentley is a 58 year old EtOH cirrhosis and sequale of portal hypertension who is here today for TIPS consultation for secondary prevention of variceal bleeding (variceal bleeding requiring banding x2). He also has ascites but this has responded well to diuretics. His extrahepatic portal vein was patent on 7/4/23. The patient's MELD score is 8. Federico Bentley is a suitable candidate for a TIPS procedure - so long as repeat imaging demonstrates patent portal and hepatic veins.  He does have a contrast allergy and so we would need to perform the procedure with CO2 and/or potentially gadolinium.   I discussed with the patient what a TIPS procedure is and showed them pictures of the stent and the way we join a hepatic and portal vein. I explained why the procedure is performed and how it decompresses the portal venous system. We discussed the risks of the procedure, which include but are not limited to infection, damage to nearby vessels or bile ducts, bleeding, failure of the procedure, stent maldeployment, liver failure, right heart failure, and encephalopathy.    I discussed that the procedure will be performed under general anaesthesia and that most patients go home the same day. I explained they will be followed with regular TIPS ultrasounds due to the risk of narrowing or thrombosis within the stent over time. We discussed that while some patients do not respond to the procedure, most patients experience relief or decrease in their ascites over 3 to 6 months after the procedure. All of the patient's questions were answered and he was interested in proceeding.     PLAN:  - Obtain US Abdomen Complete w/ Doppler to confirm PV and HV patency.  If this is questionable, we will  need to obtain an MRI.  - We will schedule the patient for the TIPS procedure.  - Due to patient severe contrast allergy will likely plan for CO2 and/or gadolinium.   - Procedure should be scheduled when ICE is available      Physician Attestation   I, Goznalez Omer MD, saw this patient and agree with the findings and plan of care as documented in the note.      Items personally reviewed/procedural attestation: vitals, labs, imaging and agree with the interpretation documented in the note, and I was present for the entire visit.    Gonzalez Omer MD         Review of the result(s) of each unique test - abdominal ultrasound 7/4/2023    45 minutes spent by me on the date of the encounter doing chart review, review of test results, interpretation of tests, patient visit, and documentation          CC  Patient Care Team:  Clinic, [facility] as PCP - Ayse Richardson PA-C as Physician Assistant (Gastroenterology)  Remington Valencia DO as Assigned Surgical Provider  Salome Marie, RN as Specialty Care Coordinator  Bee Watkins MD as MD (Gastroenterology)  Bee Watkins MD as Assigned Gastroenterology Provider  GONZALEZ OMER

## 2023-12-04 NOTE — LETTER
12/4/2023         RE: Federico Bentley  [address]  [place] [address]        Dear Colleague,    Thank you for referring your patient, Federico Bentley, to the [facility]. Please see a copy of my visit note below.          Interventional Radiology Clinic Visit    Date of this visit: 12/4/2023    Federico Bentley  is referred by Dr. Watkins for treatment recommendations. The patient requires evaluation for possible TIPS.    Primary Physician: Clinic, Marcell Montoya        History Of Present Illness:    Federico Bentley is a 58 year old male with EtOH cirrhosis and complications related to portal hypertension including a history of variceal bleeding x2 and ascites. He is here today for TIPS consultation.     Prior episodes of hematemesis (10/20 and most recently 11/23) due to variceal bleeding were managed endoscopically. He has a follow up endoscopy scheduled on 12/11/23.     He also has ascites. Between 10/13 and 10/31 he underwent paracentesis x3 and with 3.4 - 5.5 L of fluid removed. He takes lasix and spironolactone. Since starting the lasix last month he has not required any further paracentesis. He has an umbilical hernia which he is hoping to get repaired and this cannot be done until has ascites is resolved.    His last ECHO was in 2020 without evidence of right heart dysfunction. He has an ECHO currently ordered but this has not been performed yet.No prior history of hepatic encephalopathy. No lower extremity edema.     Had a remote history of contrast reaction which was documented as anaphylactic. He required epinephrine for this. He has not received contrast since this reaction.    Review of Systems:    As above    Past Medical/Surgical History:    No past medical history on file.  Past Surgical History:   Procedure Laterality Date    ESOPHAGOSCOPY, GASTROSCOPY, DUODENOSCOPY (EGD), COMBINED N/A 10/9/2020    Procedure: ESOPHAGOGASTRODUODENOSCOPY (EGD);  Surgeon:  "Mary Wheatley DO;  Location:  GI       Current Medications:    Current Outpatient Medications   Medication Sig Dispense Refill    furosemide (LASIX) 20 MG tablet Take 1 tablet (20 mg) by mouth daily 90 tablet 1    metFORMIN (GLUCOPHAGE) 1000 MG tablet Take 1,000 mg by mouth 2 times daily (with meals)      Multiple Vitamins-Minerals (SUPER THERA PREET M) TABS Take 1 tablet by mouth daily      nadolol (CORGARD) 20 MG tablet Take 1 tablet (20 mg) by mouth daily 30 tablet 0    pantoprazole (PROTONIX) 40 MG EC tablet Take 1 tablet (40 mg) by mouth 2 times daily (before meals) for 60 days 120 tablet 0    spironolactone (ALDACTONE) 25 MG tablet Take 2 tablets (50 mg) by mouth daily 180 tablet 1       Allergies:    Influenza virus vaccine h5n1, Iodine, and Contrast dye    Family History:    No family history on file.    Social History:    Last drink of alcohol on 9/15/23    Social History     Socioeconomic History    Marital status:    Tobacco Use    Smoking status: Every Day     Types: Cigarettes    Smokeless tobacco: Never   Vaping Use    Vaping Use: Never used       Physical Exam:    There were no vitals taken for this visit.   CONSTITUTIONAL: healthy, alert and no distress.  PSYCHIATRIC: mentation appears normal and affect normal.  NEURO: Normal movements and speech.  EYES: No jaundice or pallor.  SKIN: No jaundice.   RESP: No audible cough or wheeze.      Laboratory Studies:    Lab Results   Component Value Date    HGB 8.5 10/12/2020     Lab Results   Component Value Date    PLT 85 10/12/2020     Lab Results   Component Value Date    WBC 7.4 10/12/2020       Lab Results   Component Value Date    INR 1.16 10/11/2020       Lab Results   Component Value Date    PROTTOTAL 6.4 10/12/2020      Lab Results   Component Value Date    ALBUMIN 3.1 10/12/2020     Lab Results   Component Value Date    BILITOTAL 0.6 10/12/2020     No results found for: \"BILICONJ\"   Lab Results   Component Value Date    ALKPHOS 65 " 10/12/2020     Lab Results   Component Value Date    AST 36 10/12/2020     Lab Results   Component Value Date    ALT 39 10/12/2020       Lab Results   Component Value Date    CR 0.92 10/12/2020     Lab Results   Component Value Date    BUN 14 10/12/2020     Care Everywhere Labs   11/19/23 T Bili 0.6  11/19/23 Creatinine 0.95  11/21/23 INR 1.2    Original MELD based on these values: 8    Imaging:   Outside abdominal ultrasound 7/4/23 - extrahepatic main portal vein is patent.    ASSESSMENT:      Federico Bentley is a 58 year old EtOH cirrhosis and sequale of portal hypertension who is here today for TIPS consultation for secondary prevention of variceal bleeding (variceal bleeding requiring banding x2). He also has ascites but this has responded well to diuretics. His extrahepatic portal vein was patent on 7/4/23. The patient's MELD score is 8. Federico Bentley is a suitable candidate for a TIPS procedure - so long as repeat imaging demonstrates patent portal and hepatic veins.  He does have a contrast allergy and so we would need to perform the procedure with CO2 and/or potentially gadolinium.   I discussed with the patient what a TIPS procedure is and showed them pictures of the stent and the way we join a hepatic and portal vein. I explained why the procedure is performed and how it decompresses the portal venous system. We discussed the risks of the procedure, which include but are not limited to infection, damage to nearby vessels or bile ducts, bleeding, failure of the procedure, stent maldeployment, liver failure, right heart failure, and encephalopathy.    I discussed that the procedure will be performed under general anaesthesia and that most patients go home the same day. I explained they will be followed with regular TIPS ultrasounds due to the risk of narrowing or thrombosis within the stent over time. We discussed that while some patients do not respond to the procedure, most patients experience relief or  decrease in their ascites over 3 to 6 months after the procedure. All of the patient's questions were answered and he was interested in proceeding.     PLAN:  - Obtain US Abdomen Complete w/ Doppler to confirm PV and HV patency.  If this is questionable, we will need to obtain an MRI.  - We will schedule the patient for the TIPS procedure.  - Due to patient severe contrast allergy will likely plan for CO2 and/or gadolinium.   - Procedure should be scheduled when ICE is available      Physician Attestation  I, Gonzalez Omer MD, saw this patient and agree with the findings and plan of care as documented in the note.      Items personally reviewed/procedural attestation: vitals, labs, imaging and agree with the interpretation documented in the note, and I was present for the entire visit.    Gonzalez Omer MD         Review of the result(s) of each unique test - abdominal ultrasound 7/4/2023    45 minutes spent by me on the date of the encounter doing chart review, review of test results, interpretation of tests, patient visit, and documentation          CC  Patient Care Team:  Clinic, Marcell Montoya as PCP - Ayse Richardson PA-C as Physician Assistant (Gastroenterology)  Remington Valencia DO as Assigned Surgical Provider  Salome Marie, RN as Specialty Care Coordinator  Bee Watkins MD as MD (Gastroenterology)  Bee Watkins MD as Assigned Gastroenterology Provider  GONAZLEZ OMER

## 2023-12-04 NOTE — NURSING NOTE
"Oncology Rooming Note    December 4, 2023 10:33 AM   Federico Bentley is a 58 year old male who presents for:    Chief Complaint   Patient presents with    Oncology Clinic Visit     UGIB (upper gastrointestinal bleed); Alcoholic cirrhosis of liver with ascites; Portal hypertension; Secondary esophageal varices with bleeding     Initial Vitals: /66 (BP Location: Right arm, Patient Position: Sitting, Cuff Size: Adult Regular)   Pulse 73   Temp (!) 96.7  F (35.9  C) (Tympanic)   Resp 16   Wt 81.1 kg (178 lb 14.4 oz)   SpO2 100%   BMI 24.43 kg/m   Estimated body mass index is 24.43 kg/m  as calculated from the following:    Height as of 10/10/23: 1.822 m (5' 11.75\").    Weight as of this encounter: 81.1 kg (178 lb 14.4 oz). Body surface area is 2.03 meters squared.  No Pain (0) Comment: Data Unavailable   No LMP for male patient.  Allergies reviewed: Yes  Medications reviewed: Yes    Medications: Medication refills not needed today.  Pharmacy name entered into [employer]: [id number] - [place], [place] - [facility]note [address] [address]    Clinical concerns: none       Ale Aquino              "

## 2023-12-04 NOTE — PATIENT INSTRUCTIONS
Federico-You had your clinic visit with Dr. Arenas today regarding your TIPS placement. He would like you to get an ultrasound of your liver in the next couple weeks. Our 's will reach out to set that up. After that is completed, they will then be contacting you to get your TIPS scheduled. Let me know if you have any questions or concerns in the meantime.   Thanks!  Aknita Molina MS,RN,CRN  Nurse Care Coordinator-Interventional Radiology  [phone]

## 2023-12-05 ENCOUNTER — PATIENT OUTREACH (OUTPATIENT)
Dept: GASTROENTEROLOGY | Facility: CLINIC | Age: 58
End: 2023-12-05
Payer: COMMERCIAL

## 2023-12-05 NOTE — PROGRESS NOTES
Attempted to reach patient for check in, no answer, message left requesting call back, number provided.    Salome Marie, RN Care Coordinator  [facility]  Hepatology Clinic/Specialty Program

## 2023-12-11 ENCOUNTER — HOSPITAL ENCOUNTER (OUTPATIENT)
Facility: CLINIC | Age: 58
Discharge: HOME | End: 2023-12-11
Attending: STUDENT IN AN ORGANIZED HEALTH CARE EDUCATION/TRAINING PROGRAM | Admitting: STUDENT IN AN ORGANIZED HEALTH CARE EDUCATION/TRAINING PROGRAM
Payer: COMMERCIAL

## 2023-12-11 VITALS
HEART RATE: 63 BPM | DIASTOLIC BLOOD PRESSURE: 69 MMHG | OXYGEN SATURATION: 97 % | RESPIRATION RATE: 16 BRPM | SYSTOLIC BLOOD PRESSURE: 103 MMHG

## 2023-12-11 DIAGNOSIS — I85.01 BLEEDING ESOPHAGEAL VARICES, UNSPECIFIED ESOPHAGEAL VARICES TYPE (H): Primary | ICD-10-CM

## 2023-12-11 DIAGNOSIS — K70.31 ALCOHOLIC CIRRHOSIS OF LIVER WITH ASCITES (H): ICD-10-CM

## 2023-12-11 DIAGNOSIS — K92.2 UGIB (UPPER GASTROINTESTINAL BLEED): ICD-10-CM

## 2023-12-11 DIAGNOSIS — K21.00 GASTROESOPHAGEAL REFLUX DISEASE WITH ESOPHAGITIS, UNSPECIFIED WHETHER HEMORRHAGE: ICD-10-CM

## 2023-12-11 LAB
GLUCOSE BLDC GLUCOMTR-MCNC: 122 MG/DL (ref 70–99)
UPPER GI ENDOSCOPY: NORMAL

## 2023-12-11 PROCEDURE — 43244 EGD VARICES LIGATION: CPT | Performed by: STUDENT IN AN ORGANIZED HEALTH CARE EDUCATION/TRAINING PROGRAM

## 2023-12-11 PROCEDURE — 82962 GLUCOSE BLOOD TEST: CPT

## 2023-12-11 PROCEDURE — G0500 MOD SEDAT ENDO SERVICE >5YRS: HCPCS | Performed by: STUDENT IN AN ORGANIZED HEALTH CARE EDUCATION/TRAINING PROGRAM

## 2023-12-11 PROCEDURE — 250N000009 HC RX 250: Performed by: STUDENT IN AN ORGANIZED HEALTH CARE EDUCATION/TRAINING PROGRAM

## 2023-12-11 PROCEDURE — 250N000011 HC RX IP 250 OP 636: Mod: JZ | Performed by: STUDENT IN AN ORGANIZED HEALTH CARE EDUCATION/TRAINING PROGRAM

## 2023-12-11 RX ORDER — FLUMAZENIL 0.1 MG/ML
0.2 INJECTION, SOLUTION INTRAVENOUS
Status: DISCONTINUED | OUTPATIENT
Start: 2023-12-11 | End: 2023-12-11 | Stop reason: HOSPADM

## 2023-12-11 RX ORDER — ONDANSETRON 2 MG/ML
4 INJECTION INTRAMUSCULAR; INTRAVENOUS
Status: DISCONTINUED | OUTPATIENT
Start: 2023-12-11 | End: 2023-12-11 | Stop reason: HOSPADM

## 2023-12-11 RX ORDER — NALOXONE HYDROCHLORIDE 0.4 MG/ML
0.2 INJECTION, SOLUTION INTRAMUSCULAR; INTRAVENOUS; SUBCUTANEOUS
Status: DISCONTINUED | OUTPATIENT
Start: 2023-12-11 | End: 2023-12-11 | Stop reason: HOSPADM

## 2023-12-11 RX ORDER — NALOXONE HYDROCHLORIDE 0.4 MG/ML
0.4 INJECTION, SOLUTION INTRAMUSCULAR; INTRAVENOUS; SUBCUTANEOUS
Status: DISCONTINUED | OUTPATIENT
Start: 2023-12-11 | End: 2023-12-11 | Stop reason: HOSPADM

## 2023-12-11 RX ORDER — LIDOCAINE 40 MG/G
CREAM TOPICAL
Status: DISCONTINUED | OUTPATIENT
Start: 2023-12-11 | End: 2023-12-11 | Stop reason: HOSPADM

## 2023-12-11 RX ORDER — DIPHENHYDRAMINE HYDROCHLORIDE 50 MG/ML
INJECTION INTRAMUSCULAR; INTRAVENOUS PRN
Status: DISCONTINUED | OUTPATIENT
Start: 2023-12-11 | End: 2023-12-11 | Stop reason: HOSPADM

## 2023-12-11 RX ORDER — FENTANYL CITRATE 50 UG/ML
INJECTION, SOLUTION INTRAMUSCULAR; INTRAVENOUS PRN
Status: DISCONTINUED | OUTPATIENT
Start: 2023-12-11 | End: 2023-12-11 | Stop reason: HOSPADM

## 2023-12-11 RX ORDER — FUROSEMIDE 20 MG
40 TABLET ORAL DAILY
Qty: 90 TABLET | Refills: 1 | Status: ON HOLD | OUTPATIENT
Start: 2023-12-11 | End: 2024-01-31

## 2023-12-11 RX ORDER — PANTOPRAZOLE SODIUM 40 MG/1
40 TABLET, DELAYED RELEASE ORAL DAILY
Qty: 120 TABLET | Refills: 0 | Status: SHIPPED | OUTPATIENT
Start: 2023-12-11 | End: 2023-12-27

## 2023-12-11 RX ORDER — SPIRONOLACTONE 25 MG/1
100 TABLET ORAL DAILY
Qty: 180 TABLET | Refills: 1 | Status: ON HOLD | OUTPATIENT
Start: 2023-12-11 | End: 2024-01-31

## 2023-12-11 RX ORDER — ONDANSETRON 2 MG/ML
4 INJECTION INTRAMUSCULAR; INTRAVENOUS EVERY 6 HOURS PRN
Status: DISCONTINUED | OUTPATIENT
Start: 2023-12-11 | End: 2023-12-11 | Stop reason: HOSPADM

## 2023-12-11 RX ORDER — PROCHLORPERAZINE MALEATE 5 MG
10 TABLET ORAL EVERY 6 HOURS PRN
Status: DISCONTINUED | OUTPATIENT
Start: 2023-12-11 | End: 2023-12-11 | Stop reason: HOSPADM

## 2023-12-11 RX ORDER — ONDANSETRON 4 MG/1
4 TABLET, ORALLY DISINTEGRATING ORAL EVERY 6 HOURS PRN
Status: DISCONTINUED | OUTPATIENT
Start: 2023-12-11 | End: 2023-12-11 | Stop reason: HOSPADM

## 2023-12-11 ASSESSMENT — ACTIVITIES OF DAILY LIVING (ADL): ADLS_ACUITY_SCORE: 35

## 2023-12-11 NOTE — H&P
Federico Bentley  [id number]  male  58 year old      Reason for procedure/surgery: EGD to follow up variceal bleeding    Patient Active Problem List   Diagnosis    GI bleed    Alcohol abuse    Diverticular disease    Hepatic cirrhosis (H)    Microalbuminuria due to type 2 diabetes mellitus (H)    Portal hypertension (H)    Type 2 diabetes mellitus, without long-term current use of insulin (H)    Secondary esophageal varices with bleeding (H)    UGIB (upper gastrointestinal bleed)    History of diverticulitis of colon    Sigmoid diverticulitis    Alcoholic cirrhosis (H)    Diverticulitis of colon       Past Surgical History:    Past Surgical History:   Procedure Laterality Date    ESOPHAGOSCOPY, GASTROSCOPY, DUODENOSCOPY (EGD), COMBINED N/A 10/9/2020    Procedure: ESOPHAGOGASTRODUODENOSCOPY (EGD);  Surgeon: Mary Wheatley DO;  Location:  GI       Past Medical History: History reviewed. No pertinent past medical history.    Social History:   Social History     Tobacco Use    Smoking status: Every Day     Types: Cigarettes    Smokeless tobacco: Never   Substance Use Topics    Alcohol use: Not on file       Family History: History reviewed. No pertinent family history.    Allergies:   Allergies   Allergen Reactions    Influenza Virus Vaccine H5n1 Anaphylaxis     twice      Iodine Anaphylaxis, Hives and Nausea and Vomiting     Patient was given [id number] IV contrast for CT scan. Patient had immediate contrast reaction, symptoms include anaphylaxis, vomiting, hives, swollen lips and tongue. Patient was given 0.3mg Epi pen and 50 mg diphenhydramine via IV. Patient continued to have hives and vomiting, swollen lips and tongue, sent to ER. Per radiologist patient MUST be premedicated and scanned in a hospital setting due to reaction.    KM    Contrast Dye        Active Medications:   No current outpatient medications on file.       Systemic Review:   CONSTITUTIONAL: NEGATIVE for fever, chills, change in  weight  ENT/MOUTH: NEGATIVE for ear, mouth and throat problems  RESP: NEGATIVE for significant cough or SOB  CV: NEGATIVE for chest pain, palpitations or peripheral edema    Physical Examination:   Vital Signs: BP 94/64   Pulse 63   Resp 16   SpO2 100%   GENERAL: healthy, alert and no distress  NECK: no adenopathy, no asymmetry, masses, or scars  RESP: lungs clear to auscultation - no rales, rhonchi or wheezes  CV: regular rate and rhythm, normal S1 S2, no S3 or S4, no murmur, click or rub, no peripheral edema and peripheral pulses strong  ABDOMEN: soft, nontender, no hepatosplenomegaly, no masses and bowel sounds normal  MS: no gross musculoskeletal defects noted, no edema      Plan: Appropriate to proceed as scheduled.      Bee Watkins MD  12/11/2023    PCP:  Marcell Canada

## 2023-12-11 NOTE — OR NURSING
Procedure: egd  Sedation: conscious sedation   Specimens: n/a  O2: ra  Tolerated procedure: well  Pt to recovery area in stable condition accompanied by RN.   Other:  bands x 3 placed for esophageal varices

## 2023-12-12 ENCOUNTER — PATIENT OUTREACH (OUTPATIENT)
Dept: GASTROENTEROLOGY | Facility: CLINIC | Age: 58
End: 2023-12-12
Payer: COMMERCIAL

## 2023-12-12 ENCOUNTER — HOSPITAL ENCOUNTER (OUTPATIENT)
Facility: CLINIC | Age: 58
End: 2023-12-12
Attending: INTERNAL MEDICINE | Admitting: INTERNAL MEDICINE
Payer: COMMERCIAL

## 2023-12-12 ENCOUNTER — TELEPHONE (OUTPATIENT)
Dept: GASTROENTEROLOGY | Facility: CLINIC | Age: 58
End: 2023-12-12
Payer: COMMERCIAL

## 2023-12-12 DIAGNOSIS — K70.31 ALCOHOLIC CIRRHOSIS OF LIVER WITH ASCITES (H): Primary | ICD-10-CM

## 2023-12-12 NOTE — TELEPHONE ENCOUNTER
Endoscopy Scheduling Screen  Completed 10/18    Final Scheduling Details   Colonoscopy prep sent?  N/A    Procedure scheduled  Upper endoscopy (EGD)    Surgeon:  frank     Date of procedure:  1/22     Pre-OP / PAC:   No - Not required for this site.    Location  [facility] - Per exclusion criteria.    Sedation   Moderate Sedation - Per order.      Patient Reminders:   You will receive a call from a Nurse to review instructions and health history.  This assessment must be completed prior to your procedure.  Failure to complete the Nurse assessment may result in the procedure being cancelled.      On the day of your procedure, please designate an adult(s) who can drive you home stay with you for the next 24 hours. The medicines used in the exam will make you sleepy. You will not be able to drive.      You cannot take public transportation, ride share services, or non-medical taxi service without a responsible caregiver.  Medical transport services are allowed with the requirement that a responsible caregiver will receive you at your destination.  We require that drivers and caregivers are confirmed prior to your procedure.

## 2023-12-12 NOTE — PROGRESS NOTES
"TTE orders for TIPS eval faxed to [facility] at [phone].    Spoke with pt for check in. Pt had EGD done yesterday with Dr. Watkins with variceal banding, needs follow-up in 4 weeks unless TIPS can be scheduled before then. Now that TIPS eval orders have been sent to [facility], pt plans to schedule US and TTE this week, then will schedule TIPS.   Pt denies abdominal distention or leg swelling at this time, had paracentesis on 12/8 with 4.1 L output. Pt has scheduled weekly paracentesis appts X4. Dr. Watkins increased pt's diuretics yesterday to furosemide 40mg daily and spironolactone 100mg daily, pt to have BMP check 3-5 days after dose change. Pt states he plans to start dose change tomorrow and schedule lab appt for Monday 12/18. Lab orders faxed to [facility].  Denies any hematochezia, hematemesis, or melena. Denies any mental fogginess or overt confusion, states he feels \"groggy\" from yesterday's EGD. Message send to endoscopy scheduling pool regarding follow-up EGD in 4 weeks.  Will check back in with pt next week.    Salome Marie RN Care Coordinator  [facility]  Hepatology Clinic/Specialty Program    "

## 2023-12-14 ENCOUNTER — TELEPHONE (OUTPATIENT)
Dept: RADIOLOGY | Facility: CLINIC | Age: 58
End: 2023-12-14
Payer: COMMERCIAL

## 2023-12-14 NOTE — TELEPHONE ENCOUNTER
I called and spoke with Federico. He has his ultrasound scheduled for tomorrow. I told him I will have Dr. Arenas look at those results and confirm that TIPS can proceed. IR 's already have procedure information and are working on a date for the TIPS. I told Federico I would get back in touch early next week. He agreed with POC.  Ankita Molina MS,RN,CRN  Nurse Care Coordinator-Interventional Radiology  [phone]

## 2023-12-15 ENCOUNTER — PATIENT OUTREACH (OUTPATIENT)
Dept: GASTROENTEROLOGY | Facility: CLINIC | Age: 58
End: 2023-12-15

## 2023-12-15 NOTE — PROGRESS NOTES
"Spoke with pt for check in. Pt states he's \"feeling OK', reports minimal abdominal distention. Pt is scheduled for abdominal US for TIPS eval with paracentesis afterwards this afternoon. Last had paracentesis on 12/11 with 4.1L output, increased diuretics doses on 12/13. Scheduled for BMP check on Monday 12/18, scheduled for TTE for TIPS eval on Wednesday 12/20.  Pt denies any melena, hematochezia, or hematemesis. Denies any fever, chills, or sweats. Will check back in with pt regarding Monday's lab results.    Salome Marie, RN Care Coordinator  [facility]  Hepatology Clinic/Specialty Program    "

## 2023-12-22 ENCOUNTER — PATIENT OUTREACH (OUTPATIENT)
Dept: GASTROENTEROLOGY | Facility: CLINIC | Age: 58
End: 2023-12-22
Payer: COMMERCIAL

## 2023-12-22 DIAGNOSIS — K92.2 UGIB (UPPER GASTROINTESTINAL BLEED): ICD-10-CM

## 2023-12-22 DIAGNOSIS — K21.00 GASTROESOPHAGEAL REFLUX DISEASE WITH ESOPHAGITIS, UNSPECIFIED WHETHER HEMORRHAGE: ICD-10-CM

## 2023-12-22 NOTE — PROGRESS NOTES
Attempted to reach patient for check in, no answer, message left requesting call back, number provided.    [employer] message sent as well.    Salome Marie, RN Care Coordinator  [facility]  Hepatology Clinic/Specialty Program

## 2023-12-27 ENCOUNTER — TELEPHONE (OUTPATIENT)
Dept: GASTROENTEROLOGY | Facility: CLINIC | Age: 58
End: 2023-12-27
Payer: COMMERCIAL

## 2023-12-27 RX ORDER — PANTOPRAZOLE SODIUM 40 MG/1
40 TABLET, DELAYED RELEASE ORAL DAILY
Qty: 90 TABLET | Refills: 1 | Status: SHIPPED | OUTPATIENT
Start: 2023-12-27 | End: 2024-04-23

## 2023-12-27 NOTE — TELEPHONE ENCOUNTER
Caller:   Reason for Reschedule/Cancellation (please be detailed, any staff messages or encounters to note?): NOT NEEDED PER DOC      Prior to reschedule please review:  Ordering Provider:     RABIA NOLAN     Sedation per order: CS  Does patient have any ASC Exclusions, please identify?:       Notes on Cancelled Procedure:  Procedure: Upper Endoscopy [EGD]   Date: 1/22  Location: [facility]; [address] [place] [address], [address] Floor, [place] [address]  Surgeon: NOEMÍ      Rescheduled: No  Procedure:    Date:   Location:   Surgeon:   Sedation Level Scheduled  ,  Reason for Sedation Level   Prep/Instructions updated and sent:        Send In - basket message to Panc - Jon Pool if EUS  procedure is canceled or rescheduled: [ N/A, YES or NO]

## 2023-12-27 NOTE — PROGRESS NOTES
"Spoke with pt for check in. Pt states he's \"doing well\", had another paracentesis appt on Friday 12/22, did not have enough ascites to drain. Is scheduled for next para appt on 12/29, states he's \"still feeling good fluid-wise\". Denies any hematochezia, hematemesis, or melena. Denies any fever, chills, or sweats, Denies any mental fogginess or overt confusion. Requested refills of pantoprazole, sent to pt's pharmacy.  Pt states that he heard from IR scheduling yesterday, has been scheduled and confirmed appt for TIPS procedure with Dr. Arenas on 1/24/24. Pt is scheduled for follow-up EGD two days prior on 1/22 with Dr. Espino. Per Dr. Watkins, OK to cancel EGD on 1/22 as it's only 2 days prior to TIPS procedure. Discussed with pt, he states he will call to cancel his EGD.  Will plan to follow-up with pt in 1-2 weeks.    Salome Marie, RN Care Coordinator  [facility]  Hepatology Clinic/Specialty Program    "

## 2024-01-08 ENCOUNTER — TELEPHONE (OUTPATIENT)
Dept: RADIOLOGY | Facility: CLINIC | Age: 59
End: 2024-01-08
Payer: COMMERCIAL

## 2024-01-08 NOTE — TELEPHONE ENCOUNTER
I called and spoke with Federico. I answered his questions that he had regarding his TIPS procedure. I did remind him that he needed to get a pre-op History and Physical. He stated that he will call today and get the scheduled. He denied any other questions at this time.    Ankita Molina RN  Nurse Care Coordinator-Interventional Radiology  [phone]

## 2024-01-17 ENCOUNTER — PATIENT OUTREACH (OUTPATIENT)
Dept: GASTROENTEROLOGY | Facility: CLINIC | Age: 59
End: 2024-01-17
Payer: COMMERCIAL

## 2024-01-17 NOTE — PROGRESS NOTES
"Short term disability form received by clinic from pt's employer, paperwork completed and signed by provider, faxed to pt's employer at [phone]. Spoke with pt regarding paperwork, notified of completion and return to employer.  Pt states he's \"doing alright\", reports that ascites has improved with increased diuretic dosing. Did not need paracentesis at last appt on 1/12/24. Denies any abdominal distention or leg swelling, still reporting hernia pain. Denies any hematochezia, hematemesis, or melena. Pt is scheduled for TIPS on 1/24/24.    Salome Marie, RN Care Coordinator  [facility]  Hepatology Clinic/Specialty Program    "

## 2024-01-24 ENCOUNTER — APPOINTMENT (OUTPATIENT)
Dept: INTERVENTIONAL RADIOLOGY/VASCULAR | Facility: CLINIC | Age: 59
End: 2024-01-24
Attending: STUDENT IN AN ORGANIZED HEALTH CARE EDUCATION/TRAINING PROGRAM
Payer: COMMERCIAL

## 2024-01-24 ENCOUNTER — ANESTHESIA EVENT (OUTPATIENT)
Dept: SURGERY | Facility: CLINIC | Age: 59
End: 2024-01-24
Payer: COMMERCIAL

## 2024-01-24 ENCOUNTER — ANESTHESIA (OUTPATIENT)
Dept: SURGERY | Facility: CLINIC | Age: 59
End: 2024-01-24
Payer: COMMERCIAL

## 2024-01-24 ENCOUNTER — HOSPITAL ENCOUNTER (INPATIENT)
Facility: CLINIC | Age: 59
LOS: 1 days | Discharge: HOME | End: 2024-01-24
Attending: STUDENT IN AN ORGANIZED HEALTH CARE EDUCATION/TRAINING PROGRAM | Admitting: STUDENT IN AN ORGANIZED HEALTH CARE EDUCATION/TRAINING PROGRAM
Payer: COMMERCIAL

## 2024-01-24 VITALS
BODY MASS INDEX: 22.9 KG/M2 | HEIGHT: 72 IN | SYSTOLIC BLOOD PRESSURE: 97 MMHG | HEART RATE: 66 BPM | RESPIRATION RATE: 16 BRPM | OXYGEN SATURATION: 98 % | DIASTOLIC BLOOD PRESSURE: 57 MMHG | TEMPERATURE: 98.3 F | WEIGHT: 169.09 LBS

## 2024-01-24 DIAGNOSIS — K76.6 PORTAL HYPERTENSION (H): ICD-10-CM

## 2024-01-24 DIAGNOSIS — K92.2 UGIB (UPPER GASTROINTESTINAL BLEED): ICD-10-CM

## 2024-01-24 DIAGNOSIS — I85.11 SECONDARY ESOPHAGEAL VARICES WITH BLEEDING (H): ICD-10-CM

## 2024-01-24 DIAGNOSIS — K70.31 ALCOHOLIC CIRRHOSIS OF LIVER WITH ASCITES (H): ICD-10-CM

## 2024-01-24 PROBLEM — Z95.828 S/P TIPS (TRANSJUGULAR INTRAHEPATIC PORTOSYSTEMIC SHUNT): Status: ACTIVE | Noted: 2024-01-24

## 2024-01-24 LAB
ABO/RH(D): NORMAL
ACANTHOCYTES BLD QL SMEAR: ABNORMAL
ALBUMIN SERPL BCG-MCNC: 3.2 G/DL (ref 3.5–5.2)
ALP SERPL-CCNC: 95 U/L (ref 40–150)
ALT SERPL W P-5'-P-CCNC: 25 U/L (ref 0–70)
ANION GAP SERPL CALCULATED.3IONS-SCNC: 9 MMOL/L (ref 7–15)
ANTIBODY SCREEN: NEGATIVE
AST SERPL W P-5'-P-CCNC: 42 U/L (ref 0–45)
AUER BODIES BLD QL SMEAR: ABNORMAL
BASO STIPL BLD QL SMEAR: ABNORMAL
BASOPHILS # BLD AUTO: 0 10E3/UL (ref 0–0.2)
BASOPHILS NFR BLD AUTO: 1 %
BILIRUB DIRECT SERPL-MCNC: 0.38 MG/DL (ref 0–0.3)
BILIRUB SERPL-MCNC: 1 MG/DL
BITE CELLS BLD QL SMEAR: ABNORMAL
BLISTER CELLS BLD QL SMEAR: ABNORMAL
BUN SERPL-MCNC: 19.9 MG/DL (ref 6–20)
BURR CELLS BLD QL SMEAR: ABNORMAL
CALCIUM SERPL-MCNC: 8.6 MG/DL (ref 8.6–10)
CHLORIDE SERPL-SCNC: 100 MMOL/L (ref 98–107)
CREAT SERPL-MCNC: 0.94 MG/DL (ref 0.67–1.17)
DACRYOCYTES BLD QL SMEAR: SLIGHT
DEPRECATED HCO3 PLAS-SCNC: 24 MMOL/L (ref 22–29)
EGFRCR SERPLBLD CKD-EPI 2021: >90 ML/MIN/1.73M2
ELLIPTOCYTES BLD QL SMEAR: ABNORMAL
EOSINOPHIL # BLD AUTO: 0.2 10E3/UL (ref 0–0.7)
EOSINOPHIL NFR BLD AUTO: 4 %
ERYTHROCYTE [DISTWIDTH] IN BLOOD BY AUTOMATED COUNT: 16.7 % (ref 10–15)
FRAGMENTS BLD QL SMEAR: ABNORMAL
GLUCOSE BLDC GLUCOMTR-MCNC: 161 MG/DL (ref 70–99)
GLUCOSE BLDC GLUCOMTR-MCNC: 188 MG/DL (ref 70–99)
GLUCOSE BLDC GLUCOMTR-MCNC: 235 MG/DL (ref 70–99)
GLUCOSE SERPL-MCNC: 249 MG/DL (ref 70–99)
HCT VFR BLD AUTO: 30.7 % (ref 40–53)
HGB BLD-MCNC: 9.5 G/DL (ref 13.3–17.7)
HGB C CRYSTALS: ABNORMAL
HOWELL-JOLLY BOD BLD QL SMEAR: ABNORMAL
IMM GRANULOCYTES # BLD: 0 10E3/UL
IMM GRANULOCYTES NFR BLD: 1 %
INR PPP: 1.29 (ref 0.85–1.15)
LYMPHOCYTES # BLD AUTO: 1.1 10E3/UL (ref 0.8–5.3)
LYMPHOCYTES NFR BLD AUTO: 20 %
MCH RBC QN AUTO: 26.6 PG (ref 26.5–33)
MCHC RBC AUTO-ENTMCNC: 30.9 G/DL (ref 31.5–36.5)
MCV RBC AUTO: 86 FL (ref 78–100)
MONOCYTES # BLD AUTO: 0.6 10E3/UL (ref 0–1.3)
MONOCYTES NFR BLD AUTO: 11 %
NEUTROPHILS # BLD AUTO: 3.5 10E3/UL (ref 1.6–8.3)
NEUTROPHILS NFR BLD AUTO: 63 %
NEUTS HYPERSEG BLD QL SMEAR: ABNORMAL
NRBC # BLD AUTO: 0 10E3/UL
NRBC BLD AUTO-RTO: 0 /100
PLAT MORPH BLD: ABNORMAL
PLATELET # BLD AUTO: 77 10E3/UL (ref 150–450)
POLYCHROMASIA BLD QL SMEAR: SLIGHT
POTASSIUM SERPL-SCNC: 4.4 MMOL/L (ref 3.4–5.3)
PROT SERPL-MCNC: 6.8 G/DL (ref 6.4–8.3)
RBC # BLD AUTO: 3.57 10E6/UL (ref 4.4–5.9)
RBC AGGLUT BLD QL: ABNORMAL
RBC MORPH BLD: ABNORMAL
ROULEAUX BLD QL SMEAR: ABNORMAL
SICKLE CELLS BLD QL SMEAR: ABNORMAL
SMUDGE CELLS BLD QL SMEAR: ABNORMAL
SODIUM SERPL-SCNC: 133 MMOL/L (ref 135–145)
SPECIMEN EXPIRATION DATE: NORMAL
SPHEROCYTES BLD QL SMEAR: ABNORMAL
STOMATOCYTES BLD QL SMEAR: ABNORMAL
TARGETS BLD QL SMEAR: ABNORMAL
TOXIC GRANULES BLD QL SMEAR: ABNORMAL
VARIANT LYMPHS BLD QL SMEAR: PRESENT
WBC # BLD AUTO: 5.4 10E3/UL (ref 4–11)

## 2024-01-24 PROCEDURE — 250N000011 HC RX IP 250 OP 636: Performed by: NURSE ANESTHETIST, CERTIFIED REGISTERED

## 2024-01-24 PROCEDURE — 710N000010 HC RECOVERY PHASE 1, LEVEL 2, PER MIN

## 2024-01-24 PROCEDURE — C1769 GUIDE WIRE: HCPCS

## 2024-01-24 PROCEDURE — 255N000002 HC RX 255 OP 636: Performed by: STUDENT IN AN ORGANIZED HEALTH CARE EDUCATION/TRAINING PROGRAM

## 2024-01-24 PROCEDURE — 86900 BLOOD TYPING SEROLOGIC ABO: CPT | Performed by: NURSE PRACTITIONER

## 2024-01-24 PROCEDURE — 250N000011 HC RX IP 250 OP 636: Performed by: NURSE PRACTITIONER

## 2024-01-24 PROCEDURE — 250N000012 HC RX MED GY IP 250 OP 636 PS 637: Performed by: ANESTHESIOLOGY

## 2024-01-24 PROCEDURE — 36415 COLL VENOUS BLD VENIPUNCTURE: CPT | Performed by: NURSE PRACTITIONER

## 2024-01-24 PROCEDURE — 250N000009 HC RX 250: Performed by: NURSE ANESTHETIST, CERTIFIED REGISTERED

## 2024-01-24 PROCEDURE — 272N000504 HC NEEDLE CR4

## 2024-01-24 PROCEDURE — 37182 INSERT HEPATIC SHUNT (TIPS): CPT

## 2024-01-24 PROCEDURE — 258N000003 HC RX IP 258 OP 636: Performed by: ANESTHESIOLOGY

## 2024-01-24 PROCEDURE — 272N000302 HC DEVICE INFLATION CR5

## 2024-01-24 PROCEDURE — A9585 GADOBUTROL INJECTION: HCPCS | Performed by: STUDENT IN AN ORGANIZED HEALTH CARE EDUCATION/TRAINING PROGRAM

## 2024-01-24 PROCEDURE — 272N000566 HC SHEATH CR3

## 2024-01-24 PROCEDURE — 80053 COMPREHEN METABOLIC PANEL: CPT | Performed by: NURSE PRACTITIONER

## 2024-01-24 PROCEDURE — 258N000003 HC RX IP 258 OP 636: Performed by: NURSE ANESTHETIST, CERTIFIED REGISTERED

## 2024-01-24 PROCEDURE — C1887 CATHETER, GUIDING: HCPCS

## 2024-01-24 PROCEDURE — 250N000011 HC RX IP 250 OP 636

## 2024-01-24 PROCEDURE — 37182 INSERT HEPATIC SHUNT (TIPS): CPT | Mod: GC | Performed by: STUDENT IN AN ORGANIZED HEALTH CARE EDUCATION/TRAINING PROGRAM

## 2024-01-24 PROCEDURE — 06183J4 BYPASS PORTAL VEIN TO HEPATIC VEIN WITH SYNTHETIC SUBSTITUTE, PERCUTANEOUS APPROACH: ICD-10-PCS | Performed by: STUDENT IN AN ORGANIZED HEALTH CARE EDUCATION/TRAINING PROGRAM

## 2024-01-24 PROCEDURE — 999N000141 HC STATISTIC PRE-PROCEDURE NURSING ASSESSMENT

## 2024-01-24 PROCEDURE — 250N000024 HC ISOFLURANE, PER MIN

## 2024-01-24 PROCEDURE — C1753 CATH, INTRAVAS ULTRASOUND: HCPCS

## 2024-01-24 PROCEDURE — 4A043B2 MEASUREMENT OF VENOUS PRESSURE, PORTAL, PERCUTANEOUS APPROACH: ICD-10-PCS | Performed by: STUDENT IN AN ORGANIZED HEALTH CARE EDUCATION/TRAINING PROGRAM

## 2024-01-24 PROCEDURE — 85025 COMPLETE CBC W/AUTO DIFF WBC: CPT | Performed by: NURSE PRACTITIONER

## 2024-01-24 PROCEDURE — 120N000002 HC R&B MED SURG/OB UMMC: Performed by: NURSE ANESTHETIST, CERTIFIED REGISTERED

## 2024-01-24 PROCEDURE — 93005 ELECTROCARDIOGRAM TRACING: CPT

## 2024-01-24 PROCEDURE — 272N000124 HC CATH CR11

## 2024-01-24 PROCEDURE — C1725 CATH, TRANSLUMIN NON-LASER: HCPCS

## 2024-01-24 PROCEDURE — 82248 BILIRUBIN DIRECT: CPT | Performed by: NURSE PRACTITIONER

## 2024-01-24 PROCEDURE — 710N000012 HC RECOVERY PHASE 2, PER MINUTE

## 2024-01-24 PROCEDURE — 272N000570 HC SHEATH CR7

## 2024-01-24 PROCEDURE — 250N000025 HC SEVOFLURANE, PER MIN

## 2024-01-24 PROCEDURE — 85610 PROTHROMBIN TIME: CPT | Performed by: NURSE PRACTITIONER

## 2024-01-24 PROCEDURE — 272N000123 HC CATH CR9

## 2024-01-24 PROCEDURE — 370N000017 HC ANESTHESIA TECHNICAL FEE, PER MIN

## 2024-01-24 PROCEDURE — C1874 STENT, COATED/COV W/DEL SYS: HCPCS

## 2024-01-24 RX ORDER — LIDOCAINE HYDROCHLORIDE 20 MG/ML
INJECTION, SOLUTION INFILTRATION; PERINEURAL PRN
Status: DISCONTINUED | OUTPATIENT
Start: 2024-01-24 | End: 2024-01-24

## 2024-01-24 RX ORDER — OXYCODONE HYDROCHLORIDE 10 MG/1
10 TABLET ORAL
Status: DISCONTINUED | OUTPATIENT
Start: 2024-01-24 | End: 2024-01-24 | Stop reason: HOSPADM

## 2024-01-24 RX ORDER — FENTANYL CITRATE 50 UG/ML
50 INJECTION, SOLUTION INTRAMUSCULAR; INTRAVENOUS EVERY 5 MIN PRN
Status: DISCONTINUED | OUTPATIENT
Start: 2024-01-24 | End: 2024-01-24 | Stop reason: HOSPADM

## 2024-01-24 RX ORDER — DIMENHYDRINATE 50 MG/ML
25 INJECTION, SOLUTION INTRAMUSCULAR; INTRAVENOUS
Status: DISCONTINUED | OUTPATIENT
Start: 2024-01-24 | End: 2024-01-24 | Stop reason: HOSPADM

## 2024-01-24 RX ORDER — LABETALOL HYDROCHLORIDE 5 MG/ML
10 INJECTION, SOLUTION INTRAVENOUS
Status: DISCONTINUED | OUTPATIENT
Start: 2024-01-24 | End: 2024-01-24 | Stop reason: HOSPADM

## 2024-01-24 RX ORDER — PROPOFOL 10 MG/ML
INJECTION, EMULSION INTRAVENOUS PRN
Status: DISCONTINUED | OUTPATIENT
Start: 2024-01-24 | End: 2024-01-24

## 2024-01-24 RX ORDER — DEXTROSE MONOHYDRATE 25 G/50ML
25-50 INJECTION, SOLUTION INTRAVENOUS
Status: DISCONTINUED | OUTPATIENT
Start: 2024-01-24 | End: 2024-01-24 | Stop reason: HOSPADM

## 2024-01-24 RX ORDER — SODIUM CHLORIDE, SODIUM LACTATE, POTASSIUM CHLORIDE, CALCIUM CHLORIDE 600; 310; 30; 20 MG/100ML; MG/100ML; MG/100ML; MG/100ML
INJECTION, SOLUTION INTRAVENOUS CONTINUOUS
Status: DISCONTINUED | OUTPATIENT
Start: 2024-01-24 | End: 2024-01-24 | Stop reason: HOSPADM

## 2024-01-24 RX ORDER — NICOTINE POLACRILEX 4 MG
15-30 LOZENGE BUCCAL
Status: DISCONTINUED | OUTPATIENT
Start: 2024-01-24 | End: 2024-01-24 | Stop reason: HOSPADM

## 2024-01-24 RX ORDER — HYDRALAZINE HYDROCHLORIDE 20 MG/ML
2.5-5 INJECTION INTRAMUSCULAR; INTRAVENOUS EVERY 10 MIN PRN
Status: DISCONTINUED | OUTPATIENT
Start: 2024-01-24 | End: 2024-01-24 | Stop reason: HOSPADM

## 2024-01-24 RX ORDER — FENTANYL CITRATE 50 UG/ML
INJECTION, SOLUTION INTRAMUSCULAR; INTRAVENOUS PRN
Status: DISCONTINUED | OUTPATIENT
Start: 2024-01-24 | End: 2024-01-24

## 2024-01-24 RX ORDER — ONDANSETRON 2 MG/ML
4 INJECTION INTRAMUSCULAR; INTRAVENOUS EVERY 30 MIN PRN
Status: DISCONTINUED | OUTPATIENT
Start: 2024-01-24 | End: 2024-01-24 | Stop reason: HOSPADM

## 2024-01-24 RX ORDER — SODIUM CHLORIDE, SODIUM LACTATE, POTASSIUM CHLORIDE, CALCIUM CHLORIDE 600; 310; 30; 20 MG/100ML; MG/100ML; MG/100ML; MG/100ML
INJECTION, SOLUTION INTRAVENOUS CONTINUOUS PRN
Status: DISCONTINUED | OUTPATIENT
Start: 2024-01-24 | End: 2024-01-24

## 2024-01-24 RX ORDER — HYDROMORPHONE HYDROCHLORIDE 1 MG/ML
0.2 INJECTION, SOLUTION INTRAMUSCULAR; INTRAVENOUS; SUBCUTANEOUS EVERY 5 MIN PRN
Status: DISCONTINUED | OUTPATIENT
Start: 2024-01-24 | End: 2024-01-24 | Stop reason: HOSPADM

## 2024-01-24 RX ORDER — FENTANYL CITRATE 50 UG/ML
25 INJECTION, SOLUTION INTRAMUSCULAR; INTRAVENOUS EVERY 5 MIN PRN
Status: DISCONTINUED | OUTPATIENT
Start: 2024-01-24 | End: 2024-01-24 | Stop reason: HOSPADM

## 2024-01-24 RX ORDER — OXYCODONE HYDROCHLORIDE 5 MG/1
5 TABLET ORAL
Status: DISCONTINUED | OUTPATIENT
Start: 2024-01-24 | End: 2024-01-24 | Stop reason: HOSPADM

## 2024-01-24 RX ORDER — HYDROMORPHONE HYDROCHLORIDE 1 MG/ML
0.4 INJECTION, SOLUTION INTRAMUSCULAR; INTRAVENOUS; SUBCUTANEOUS EVERY 5 MIN PRN
Status: DISCONTINUED | OUTPATIENT
Start: 2024-01-24 | End: 2024-01-24 | Stop reason: HOSPADM

## 2024-01-24 RX ORDER — ACETAMINOPHEN 500 MG
1000 TABLET ORAL ONCE
Status: DISCONTINUED | OUTPATIENT
Start: 2024-01-24 | End: 2024-01-24 | Stop reason: HOSPADM

## 2024-01-24 RX ORDER — ONDANSETRON 2 MG/ML
INJECTION INTRAMUSCULAR; INTRAVENOUS PRN
Status: DISCONTINUED | OUTPATIENT
Start: 2024-01-24 | End: 2024-01-24

## 2024-01-24 RX ORDER — AMPICILLIN AND SULBACTAM 2; 1 G/1; G/1
3 INJECTION, POWDER, FOR SOLUTION INTRAMUSCULAR; INTRAVENOUS
Status: COMPLETED | OUTPATIENT
Start: 2024-01-24 | End: 2024-01-24

## 2024-01-24 RX ORDER — GADOBUTROL 604.72 MG/ML
0.1 INJECTION INTRAVENOUS ONCE
Status: COMPLETED | OUTPATIENT
Start: 2024-01-24 | End: 2024-01-24

## 2024-01-24 RX ORDER — ONDANSETRON 4 MG/1
4 TABLET, ORALLY DISINTEGRATING ORAL EVERY 30 MIN PRN
Status: DISCONTINUED | OUTPATIENT
Start: 2024-01-24 | End: 2024-01-24 | Stop reason: HOSPADM

## 2024-01-24 RX ORDER — AMPICILLIN AND SULBACTAM 1; .5 G/1; G/1
INJECTION, POWDER, FOR SOLUTION INTRAMUSCULAR; INTRAVENOUS PRN
Status: DISCONTINUED | OUTPATIENT
Start: 2024-01-24 | End: 2024-01-24

## 2024-01-24 RX ADMIN — FENTANYL CITRATE 100 MCG: 50 INJECTION INTRAMUSCULAR; INTRAVENOUS at 08:34

## 2024-01-24 RX ADMIN — LIDOCAINE HYDROCHLORIDE 100 MG: 20 INJECTION, SOLUTION INFILTRATION; PERINEURAL at 08:34

## 2024-01-24 RX ADMIN — SUGAMMADEX 200 MG: 100 INJECTION, SOLUTION INTRAVENOUS at 12:20

## 2024-01-24 RX ADMIN — Medication 10 MG: at 10:13

## 2024-01-24 RX ADMIN — SODIUM CHLORIDE, POTASSIUM CHLORIDE, SODIUM LACTATE AND CALCIUM CHLORIDE: 600; 310; 30; 20 INJECTION, SOLUTION INTRAVENOUS at 08:00

## 2024-01-24 RX ADMIN — NOREPINEPHRINE BITARTRATE 12.8 MCG: 1 INJECTION, SOLUTION, CONCENTRATE INTRAVENOUS at 09:46

## 2024-01-24 RX ADMIN — MIDAZOLAM 2 MG: 1 INJECTION INTRAMUSCULAR; INTRAVENOUS at 08:12

## 2024-01-24 RX ADMIN — Medication 50 MG: at 08:34

## 2024-01-24 RX ADMIN — Medication 10 MG: at 09:31

## 2024-01-24 RX ADMIN — PROCHLORPERAZINE EDISYLATE 5 MG: 5 INJECTION INTRAMUSCULAR; INTRAVENOUS at 12:55

## 2024-01-24 RX ADMIN — AMPICILLIN SODIUM AND SULBACTAM SODIUM 1.5 G: 1; .5 INJECTION, POWDER, FOR SOLUTION INTRAMUSCULAR; INTRAVENOUS at 10:30

## 2024-01-24 RX ADMIN — Medication 20 MG: at 10:31

## 2024-01-24 RX ADMIN — Medication 10 MG: at 11:35

## 2024-01-24 RX ADMIN — GADOBUTROL 10 ML: 604.72 INJECTION INTRAVENOUS at 12:28

## 2024-01-24 RX ADMIN — AMPICILLIN SODIUM AND SULBACTAM SODIUM 3 G: 2; 1 INJECTION, POWDER, FOR SOLUTION INTRAMUSCULAR; INTRAVENOUS at 08:27

## 2024-01-24 RX ADMIN — ONDANSETRON 4 MG: 2 INJECTION INTRAMUSCULAR; INTRAVENOUS at 08:12

## 2024-01-24 RX ADMIN — Medication 10 MG: at 11:17

## 2024-01-24 RX ADMIN — SODIUM CHLORIDE 5 UNITS: 9 INJECTION, SOLUTION INTRAVENOUS at 07:53

## 2024-01-24 RX ADMIN — PROPOFOL 100 MG: 10 INJECTION, EMULSION INTRAVENOUS at 08:34

## 2024-01-24 ASSESSMENT — ACTIVITIES OF DAILY LIVING (ADL)
ADLS_ACUITY_SCORE: 31

## 2024-01-24 NOTE — PROCEDURES
[facility]    Procedure: IR Procedure Note    Date/Time: 1/24/2024 12:27 PM    Performed by: Derek Shafer MD  Authorized by: Derek Shafer MD  IR Fellow Physician: Derek Shafer  Other(s) attending procedure: Attending: MOHSEN Arenas MD      UNIVERSAL PROTOCOL   Site Marked: NA  Prior Images Obtained and Reviewed:  Yes  Required items: Required blood products, implants, devices and special equipment available    Patient identity confirmed:  Verbally with patient, arm band, provided demographic data and hospital-assigned identification number  Patient was reevaluated immediately before administering moderate or deep sedation or anesthesia  Confirmation Checklist:  Patient's identity using two indicators, relevant allergies, procedure was appropriate and matched the consent or emergent situation and correct equipment/implants were available  Time out: Immediately prior to the procedure a time out was called    Universal Protocol: the Joint Commission Universal Protocol was followed    Preparation: Patient was prepped and draped in usual sterile fashion    ESBL (mL):  10     ANESTHESIA    Anesthesia:  Local infiltration  Local Anesthetic:  Lidocaine 1% without epinephrine  Anesthetic Total (mL):  5      SEDATION  Patient Sedated: Yes    Sedation:  See MAR for details  Vital signs: Vital signs monitored during sedation    See dictated procedure note for full details.  Findings: See dictation in imaging tab of chart review.    Specimens: none    Complications: None    Condition: Stable    Plan: Return to PACU.  Bedrest x 4 hours.   If meeting discharge criteria after 4 hours ok to discharge to home.      PROCEDURE  Describe Procedure: Successful TIPS creation. Final portosystemic gradient of 12 after plastying the stent to 10 mm.  Patient Tolerance:  Patient tolerated the procedure well with no immediate complications  Length of time physician/provider present for 1:1  monitoring during sedation: 0

## 2024-01-24 NOTE — PROGRESS NOTES
MPV:28  PVMS:27  MID-STENT:17  HP:17    HPNS:26  PVNS:24  MID STENT:17  RA:14  MPV: 26  GRADIENT:12

## 2024-01-24 NOTE — DISCHARGE INSTRUCTIONS
Contacting your Doctor -   To contact a doctor, call Interventional Radiology from 8 am to 5 pm @ [phone]. After hours call [phone] and ask for MD on call for Interventional Radiology   or:   Emergency Department:  [place]: [phone]  [facility]: [phone] 911 if you are in need of immediate or emergent help

## 2024-01-24 NOTE — ANESTHESIA POSTPROCEDURE EVALUATION
Patient: Federico Bentley    Procedure: Procedure(s):  ANESTHESIA OUT OF OR Transjugular Intrahepatic Portosystemic Shunts (TIPS) Procedure @0800       Anesthesia Type:  General    Note:  Disposition: Outpatient   Postop Pain Control: Uneventful            Sign Out: Well controlled pain   PONV: No   Neuro/Psych: Uneventful            Sign Out: Acceptable/Baseline neuro status   Airway/Respiratory: Uneventful            Sign Out: Acceptable/Baseline resp. status   CV/Hemodynamics: Uneventful            Sign Out: Acceptable CV status; No obvious hypovolemia; No obvious fluid overload   Other NRE: NONE   DID A NON-ROUTINE EVENT OCCUR? No           Last vitals:  Vitals Value Taken Time   BP 98/56 01/24/24 1300   Temp 36.8  C (98.3  F) 01/24/24 1240   Pulse 65 01/24/24 1313   Resp 16 01/24/24 1240   SpO2 95 % 01/24/24 1313   Vitals shown include unfiled device data.    Electronically Signed By: Berto Ring MD  January 24, 2024  1:14 PM

## 2024-01-24 NOTE — ANESTHESIA CARE TRANSFER NOTE
Patient: Federico Bentley    Procedure: Procedure(s):  ANESTHESIA OUT OF OR Transjugular Intrahepatic Portosystemic Shunts (TIPS) Procedure @0800       Diagnosis: Upper gastrointestinal bleeding [K92.2]  Diagnosis Additional Information: No value filed.    Anesthesia Type:   General     Note:      Level of Consciousness: awake  Oxygen Supplementation: face mask  Level of Supplemental Oxygen (L/min / FiO2): 8  Independent Airway: airway patency satisfactory and stable  Dentition: dentition unchanged  Vital Signs Stable: post-procedure vital signs reviewed and stable  Report to RN Given: handoff report given  Patient transferred to: PACU    Handoff Report: Identifed the Patient, Identified the Reponsible Provider, Reviewed the pertinent medical history, Discussed the surgical course, Reviewed Intra-OP anesthesia mangement and issues during anesthesia, Set expectations for post-procedure period and Allowed opportunity for questions and acknowledgement of understanding      Vitals:  Vitals Value Taken Time   /59 01/24/24 1241   Temp 36.8  C (98.3  F) 01/24/24 1240   Pulse 67 01/24/24 1243   Resp 16 01/24/24 1240   SpO2 96 % 01/24/24 1243   Vitals shown include unfiled device data.    Electronically Signed By: ELMER Sr CRNA  January 24, 2024  12:44 PM

## 2024-01-24 NOTE — ANESTHESIA PROCEDURE NOTES
Airway       Patient location during procedure: OR       Procedure Start/Stop Times: 1/24/2024 8:37 AM  Staff -        CRNA: Moises Iqbal APRN CRNA       Performed By: CRNA  Consent for Airway        Urgency: elective  Indications and Patient Condition       Indications for airway management: nu-procedural       Induction type:intravenous       Mask difficulty assessment: 0 - not attempted    Final Airway Details       Final airway type: endotracheal airway       Successful airway: ETT - single  Endotracheal Airway Details        ETT size (mm): 8.0       Cuffed: yes       Successful intubation technique: direct laryngoscopy       DL Blade Type: Roberson 2       Grade View of Cords: 1       Position: Right       Measured from: gums/teeth       Secured at (cm): 24    Post intubation assessment        Placement verified by: capnometry, equal breath sounds and chest rise        Number of attempts at approach: 1       Secured with: cloth tape       Ease of procedure: easy       Dentition: Intact    Medication(s) Administered   Medication Administration Time: 1/24/2024 8:37 AM

## 2024-01-24 NOTE — PROGRESS NOTES
Patient Name: Federico Bentley  Medical Record Number: [id number]  Today's Date: 1/24/2024    Procedure: Transjugular intrahepatic portosystemic shunt placement  Proceduralist: Dr. Hollis Hardin  Pathology present: NA    Procedure Start: 0920  Procedure end: 1215  Sedation medications administered: By anesthesia     Report given to: PACU, RN  : RONALD    Other Notes: Pt arrived to IR room 1 from . Consent reviewed. Pt denies any questions or concerns regarding procedure. Pt positioned supine and monitored per protocol. Pt tolerated procedure without any noted complications. Pt transferred to PACU.

## 2024-01-24 NOTE — ANESTHESIA PROCEDURE NOTES
Arterial Line Procedure Note    Pre-Procedure   Staff -        CRNA: Moises Iqbal APRN CRNA       Performed By: CRNA       Location: OR       Pre-Anesthestic Checklist: patient identified, IV checked, risks and benefits discussed, informed consent, monitors and equipment checked, pre-op evaluation and at physician/surgeon's request  Timeout:       Correct Patient: Yes        Correct Procedure: Yes        Correct Site: Yes        Correct Position: Yes   Line Placement:   This line was placed Post Induction starting at 1/24/2024 8:20 AM  Procedure   Procedure: arterial line       Laterality: left       Insertion Site: radial.  Sterile Prep        Standard elements of sterile barrier followed       Skin prep: Chloraprep  Insertion/Injection        Catheter Type/Size: 20 G, 1.75 in/4.5 cm quick cath (integral wire)  Narrative        Tegaderm dressing used.       Complications: None apparent,        Arterial waveform: Yes        IBP within 10% of NIBP: Yes

## 2024-01-24 NOTE — ANESTHESIA PREPROCEDURE EVALUATION
Anesthesia Pre-Procedure Evaluation    Patient: Federico Bentley   MRN: [id number] : 1965        Procedure : Procedure(s):  ANESTHESIA OUT OF OR Transjugular Intrahepatic Portosystemic Shunts (TIPS) Procedure @0800          Past Medical History:   Diagnosis Date    Alcoholic cirrhosis (H)     Diabetes (H)     Portal hypertension (H)     UGIB (upper gastrointestinal bleed)       Past Surgical History:   Procedure Laterality Date    APPENDECTOMY      COLECTOMY PARTIAL      COLONOSCOPY      ESOPHAGOSCOPY, GASTROSCOPY, DUODENOSCOPY (EGD), COMBINED N/A 10/09/2020    Procedure: ESOPHAGOGASTRODUODENOSCOPY (EGD);  Surgeon: Mary Wheatley DO;  Location:  GI      Allergies   Allergen Reactions    Influenza Virus Vaccine H5n1 Anaphylaxis     twice      Iodine Anaphylaxis, Hives and Nausea and Vomiting     Patient was given [id number] IV contrast for CT scan. Patient had immediate contrast reaction, symptoms include anaphylaxis, vomiting, hives, swollen lips and tongue. Patient was given 0.3mg Epi pen and 50 mg diphenhydramine via IV. Patient continued to have hives and vomiting, swollen lips and tongue, sent to ER. Per radiologist patient MUST be premedicated and scanned in a hospital setting due to reaction.    KM    Contrast Dye       Social History     Tobacco Use    Smoking status: Every Day     Types: Cigarettes    Smokeless tobacco: Never   Substance Use Topics    Alcohol use: Not Currently     Comment: Sober since 25      Wt Readings from Last 1 Encounters:   24 76.7 kg (169 lb 1.5 oz)        Anesthesia Evaluation   Pt has had prior anesthetic. Type: MAC and General.        ROS/MED HX  ENT/Pulmonary:  - neg pulmonary ROS     Neurologic:  - neg neurologic ROS     Cardiovascular:       METS/Exercise Tolerance:     Hematologic:  - neg hematologic  ROS     Musculoskeletal:  - neg musculoskeletal ROS     GI/Hepatic:     (+)             liver disease,       Renal/Genitourinary:     (+) renal disease,              Endo:     (+)  type II DM,                    Psychiatric/Substance Use:  - neg psychiatric ROS     Infectious Disease:  - neg infectious disease ROS     Malignancy:       Other:            Physical Exam    Airway  airway exam normal      Mallampati: I   TM distance: > 3 FB   Neck ROM: full   Mouth opening: > 3 cm    Respiratory Devices and Support         Dental       (+) Minor Abnormalities - some fillings, tiny chips      Cardiovascular   cardiovascular exam normal       Rhythm and rate: regular and normal     Pulmonary   pulmonary exam normal        breath sounds clear to auscultation           OUTSIDE LABS:  CBC:   Lab Results   Component Value Date    WBC 7.4 10/12/2020    WBC 8.9 10/11/2020    HGB 8.5 (L) 10/12/2020    HGB 8.0 (L) 10/11/2020    HCT 27.5 (L) 10/12/2020    HCT 26.0 (L) 10/11/2020    PLT 85 (L) 10/12/2020    PLT 73 (L) 10/11/2020     BMP:   Lab Results   Component Value Date     10/12/2020     10/11/2020    POTASSIUM 4.2 10/12/2020    POTASSIUM 3.8 10/11/2020    CHLORIDE 108 10/12/2020    CHLORIDE 111 (H) 10/11/2020    CO2 26 10/12/2020    CO2 24 10/11/2020    BUN 14 10/12/2020    BUN 20 10/11/2020    CR 0.92 10/12/2020    CR 0.82 10/11/2020     (H) 12/11/2023     (H) 10/12/2020     COAGS:   Lab Results   Component Value Date    PTT 28 10/09/2020    INR 1.29 (H) 01/24/2024    FIBR 257 10/09/2020     POC:   Lab Results   Component Value Date     (H) 10/12/2020     HEPATIC:   Lab Results   Component Value Date    ALBUMIN 3.1 (L) 10/12/2020    PROTTOTAL 6.4 (L) 10/12/2020    ALT 39 10/12/2020    AST 36 10/12/2020    ALKPHOS 65 10/12/2020    BILITOTAL 0.6 10/12/2020     OTHER:   Lab Results   Component Value Date    LACT 1.9 10/09/2020    A1C 6.9 (H) 10/09/2020    CONSTANZA 8.2 (L) 10/12/2020    PHOS 3.0 10/12/2020    MAG 1.6 10/10/2020       Anesthesia Plan    ASA Status:  3    NPO Status:  NPO Appropriate    Anesthesia Type: General.     - Airway: ETT    Induction: Intravenous.   Maintenance: Inhalation.        Consents    Anesthesia Plan(s) and associated risks, benefits, and realistic alternatives discussed. Questions answered and patient/representative(s) expressed understanding.     - Discussed: Risks, Benefits and Alternatives for BOTH SEDATION and the PROCEDURE were discussed     - Discussed with:  Patient      - Extended Intubation/Ventilatory Support Discussed: Yes.      - Patient is DNR/DNI Status: No     Use of blood products discussed: Yes.     - Discussed with: Patient.     Postoperative Care    Pain management: IV analgesics.   PONV prophylaxis: Ondansetron (or other 5HT-3), Dexamethasone or Solumedrol     Comments:    Other Comments: The material risks, benefits, and alternatives were discussed in detail.  The patient agrees to proceed.  The patient has no other complaints at this time.           Moises Ramirez MD    I have reviewed the pertinent notes and labs in the chart from the past 30 days and (re)examined the patient.  Any updates or changes from those notes are reflected in this note.            # Coagulation Defect: INR = 1.29 (Ref range: 0.85 - 1.15) and/or PTT = N/A, will monitor for bleeding

## 2024-01-24 NOTE — ANESTHESIA POSTPROCEDURE EVALUATION
Patient: Federico Bentley    Procedure: Procedure(s):  ANESTHESIA OUT OF OR Transjugular Intrahepatic Portosystemic Shunts (TIPS) Procedure @0800       Anesthesia Type:  General    Note:  Disposition: Admission   Postop Pain Control: Uneventful            Sign Out: Well controlled pain   PONV: No   Neuro/Psych: Uneventful            Sign Out: Acceptable/Baseline neuro status   Airway/Respiratory: Uneventful            Sign Out: Acceptable/Baseline resp. status   CV/Hemodynamics: Uneventful            Sign Out: Acceptable CV status; No obvious hypovolemia; No obvious fluid overload   Other NRE: NONE   DID A NON-ROUTINE EVENT OCCUR? No    Event details/Postop Comments:  No complications.           Last vitals:  Vitals Value Taken Time   /64 01/24/24 1315   Temp 36.8  C (98.3  F) 01/24/24 1240   Pulse 68 01/24/24 1316   Resp 16 01/24/24 1240   SpO2 95 % 01/24/24 1316   Vitals shown include unfiled device data.    Electronically Signed By: Moises Ramirez MD  January 24, 2024  1:17 PM

## 2024-01-24 NOTE — PROVIDER NOTIFICATION
Notified Dr. Ramirez, blood sugar 235 in pre op.    Verbal order to give 5 units regular insulin IV.

## 2024-01-25 ENCOUNTER — PATIENT OUTREACH (OUTPATIENT)
Dept: CARE COORDINATION | Facility: CLINIC | Age: 59
End: 2024-01-25
Payer: COMMERCIAL

## 2024-01-25 ENCOUNTER — TELEPHONE (OUTPATIENT)
Dept: RADIOLOGY | Facility: CLINIC | Age: 59
End: 2024-01-25
Payer: COMMERCIAL

## 2024-01-25 LAB
ATRIAL RATE - MUSE: 67 BPM
DIASTOLIC BLOOD PRESSURE - MUSE: NORMAL MMHG
INTERPRETATION ECG - MUSE: NORMAL
P AXIS - MUSE: 31 DEGREES
PR INTERVAL - MUSE: 150 MS
QRS DURATION - MUSE: 100 MS
QT - MUSE: 442 MS
QTC - MUSE: 467 MS
R AXIS - MUSE: 22 DEGREES
SYSTOLIC BLOOD PRESSURE - MUSE: NORMAL MMHG
T AXIS - MUSE: 42 DEGREES
VENTRICULAR RATE- MUSE: 67 BPM

## 2024-01-25 NOTE — TELEPHONE ENCOUNTER
I called and spoke with Federico's wife. She states that he is doing well post TIPS. Is sleepy, but no confusion noted. He is eating and drinking without any issues. His access site is flat and soft with minimal bruising. He does not have any edema. He will have imaging and a clinic visit with Dr. Arenas in one month. His wife denied any questions or concerns at this time.     Ankita Molina RN  Nurse Care Coordinator-Interventional Radiology  [phone]

## 2024-01-25 NOTE — PROGRESS NOTES
Connected Care Resource Center: [facility]    Background: Transitional Care Management program identified per system criteria and reviewed by [facility] Resource [place] team for possible outreach.    Assessment: Upon chart review, CCR Team member will not proceed with patient outreach related to this episode of Transitional Care Management program due to reason below:    Patient has active communication with a nurse, provider or care team for reason of post-hospital follow up plan.  Outreach call by CCRC team not indicated to minimize duplicative efforts.     Plan: Transitional Care Management episode addressed appropriately per reason noted above.      Charley Allen  Community Health Worker  [facility]  [phone]      *Connected Care Resource Team does NOT follow patient ongoing. Referrals are identified based on internal discharge reports and the outreach is to ensure patient has an understanding of their discharge instructions.

## 2024-01-27 ENCOUNTER — HOSPITAL ENCOUNTER (INPATIENT)
Facility: CLINIC | Age: 59
LOS: 4 days | Discharge: HOME | End: 2024-01-31
Attending: INTERNAL MEDICINE | Admitting: INTERNAL MEDICINE
Payer: COMMERCIAL

## 2024-01-27 DIAGNOSIS — K76.82 HEPATIC ENCEPHALOPATHY (H): Primary | ICD-10-CM

## 2024-01-27 DIAGNOSIS — K70.31 ALCOHOLIC CIRRHOSIS OF LIVER WITH ASCITES (H): ICD-10-CM

## 2024-01-27 PROBLEM — R65.21 SEPTIC SHOCK (H): Status: ACTIVE | Noted: 2024-01-27

## 2024-01-27 PROBLEM — A41.9 SEPTIC SHOCK (H): Status: ACTIVE | Noted: 2024-01-27

## 2024-01-27 LAB
ALBUMIN UR-MCNC: 10 MG/DL
APPEARANCE UR: CLEAR
BASOPHILS # BLD AUTO: 0 10E3/UL (ref 0–0.2)
BASOPHILS NFR BLD AUTO: 0 %
BILIRUB UR QL STRIP: ABNORMAL
COLOR UR AUTO: ABNORMAL
EOSINOPHIL # BLD AUTO: 0.2 10E3/UL (ref 0–0.7)
EOSINOPHIL NFR BLD AUTO: 2 %
ERYTHROCYTE [DISTWIDTH] IN BLOOD BY AUTOMATED COUNT: 17.9 % (ref 10–15)
GLUCOSE BLDC GLUCOMTR-MCNC: 142 MG/DL (ref 70–99)
GLUCOSE BLDC GLUCOMTR-MCNC: 143 MG/DL (ref 70–99)
GLUCOSE BLDC GLUCOMTR-MCNC: 186 MG/DL (ref 70–99)
GLUCOSE UR STRIP-MCNC: NEGATIVE MG/DL
HBA1C MFR BLD: 8.8 %
HCT VFR BLD AUTO: 27.5 % (ref 40–53)
HGB BLD-MCNC: 8.5 G/DL (ref 13.3–17.7)
HGB UR QL STRIP: ABNORMAL
IMM GRANULOCYTES # BLD: 0.1 10E3/UL
IMM GRANULOCYTES NFR BLD: 1 %
KETONES UR STRIP-MCNC: NEGATIVE MG/DL
LEUKOCYTE ESTERASE UR QL STRIP: NEGATIVE
LYMPHOCYTES # BLD AUTO: 1.6 10E3/UL (ref 0.8–5.3)
LYMPHOCYTES NFR BLD AUTO: 15 %
MCH RBC QN AUTO: 26.4 PG (ref 26.5–33)
MCHC RBC AUTO-ENTMCNC: 30.9 G/DL (ref 31.5–36.5)
MCV RBC AUTO: 85 FL (ref 78–100)
MONOCYTES # BLD AUTO: 1.4 10E3/UL (ref 0–1.3)
MONOCYTES NFR BLD AUTO: 13 %
NEUTROPHILS # BLD AUTO: 7.3 10E3/UL (ref 1.6–8.3)
NEUTROPHILS NFR BLD AUTO: 69 %
NITRATE UR QL: NEGATIVE
NRBC # BLD AUTO: 0 10E3/UL
NRBC BLD AUTO-RTO: 0 /100
PH UR STRIP: 6.5 [PH] (ref 5–7)
PLATELET # BLD AUTO: 61 10E3/UL (ref 150–450)
RBC # BLD AUTO: 3.22 10E6/UL (ref 4.4–5.9)
RBC URINE: 6 /HPF
SP GR UR STRIP: 1.03 (ref 1–1.03)
SQUAMOUS EPITHELIAL: <1 /HPF
UROBILINOGEN UR STRIP-MCNC: 4 MG/DL
WBC # BLD AUTO: 10.5 10E3/UL (ref 4–11)
WBC URINE: 1 /HPF

## 2024-01-27 PROCEDURE — 87040 BLOOD CULTURE FOR BACTERIA: CPT | Performed by: INTERNAL MEDICINE

## 2024-01-27 PROCEDURE — 82040 ASSAY OF SERUM ALBUMIN: CPT

## 2024-01-27 PROCEDURE — 83036 HEMOGLOBIN GLYCOSYLATED A1C: CPT

## 2024-01-27 PROCEDURE — 250N000013 HC RX MED GY IP 250 OP 250 PS 637: Performed by: INTERNAL MEDICINE

## 2024-01-27 PROCEDURE — 83735 ASSAY OF MAGNESIUM: CPT

## 2024-01-27 PROCEDURE — 36415 COLL VENOUS BLD VENIPUNCTURE: CPT | Performed by: INTERNAL MEDICINE

## 2024-01-27 PROCEDURE — 200N000001 HC R&B ICU

## 2024-01-27 PROCEDURE — 250N000011 HC RX IP 250 OP 636: Performed by: INTERNAL MEDICINE

## 2024-01-27 PROCEDURE — 82962 GLUCOSE BLOOD TEST: CPT

## 2024-01-27 PROCEDURE — 250N000012 HC RX MED GY IP 250 OP 636 PS 637

## 2024-01-27 PROCEDURE — 84100 ASSAY OF PHOSPHORUS: CPT

## 2024-01-27 PROCEDURE — 87086 URINE CULTURE/COLONY COUNT: CPT | Performed by: INTERNAL MEDICINE

## 2024-01-27 PROCEDURE — 85025 COMPLETE CBC W/AUTO DIFF WBC: CPT

## 2024-01-27 PROCEDURE — 81001 URINALYSIS AUTO W/SCOPE: CPT | Performed by: INTERNAL MEDICINE

## 2024-01-27 PROCEDURE — 3E043XZ INTRODUCTION OF VASOPRESSOR INTO CENTRAL VEIN, PERCUTANEOUS APPROACH: ICD-10-PCS | Performed by: INTERNAL MEDICINE

## 2024-01-27 PROCEDURE — 99233 SBSQ HOSP IP/OBS HIGH 50: CPT | Performed by: INTERNAL MEDICINE

## 2024-01-27 PROCEDURE — 258N000003 HC RX IP 258 OP 636: Performed by: INTERNAL MEDICINE

## 2024-01-27 RX ORDER — NICOTINE POLACRILEX 4 MG
15-30 LOZENGE BUCCAL
Status: DISCONTINUED | OUTPATIENT
Start: 2024-01-27 | End: 2024-01-29

## 2024-01-27 RX ORDER — LACTULOSE 10 G/15ML
20 SOLUTION ORAL
Status: DISCONTINUED | OUTPATIENT
Start: 2024-01-27 | End: 2024-01-31 | Stop reason: HOSPADM

## 2024-01-27 RX ORDER — NOREPINEPHRINE BITARTRATE 0.02 MG/ML
INJECTION, SOLUTION INTRAVENOUS
Status: DISCONTINUED
Start: 2024-01-27 | End: 2024-01-28 | Stop reason: HOSPADM

## 2024-01-27 RX ORDER — DEXTROSE MONOHYDRATE 25 G/50ML
25-50 INJECTION, SOLUTION INTRAVENOUS
Status: DISCONTINUED | OUTPATIENT
Start: 2024-01-27 | End: 2024-01-28

## 2024-01-27 RX ORDER — DEXTROSE MONOHYDRATE 25 G/50ML
25-50 INJECTION, SOLUTION INTRAVENOUS
Status: DISCONTINUED | OUTPATIENT
Start: 2024-01-27 | End: 2024-01-29

## 2024-01-27 RX ORDER — NICOTINE POLACRILEX 4 MG
15-30 LOZENGE BUCCAL
Status: DISCONTINUED | OUTPATIENT
Start: 2024-01-27 | End: 2024-01-28

## 2024-01-27 RX ORDER — CEFTRIAXONE 1 G/1
1 INJECTION, POWDER, FOR SOLUTION INTRAMUSCULAR; INTRAVENOUS EVERY 24 HOURS
Status: DISCONTINUED | OUTPATIENT
Start: 2024-01-27 | End: 2024-01-27

## 2024-01-27 RX ORDER — PIPERACILLIN SODIUM, TAZOBACTAM SODIUM 3; .375 G/15ML; G/15ML
3.38 INJECTION, POWDER, LYOPHILIZED, FOR SOLUTION INTRAVENOUS EVERY 6 HOURS
Status: DISCONTINUED | OUTPATIENT
Start: 2024-01-27 | End: 2024-01-29

## 2024-01-27 RX ORDER — NOREPINEPHRINE BITARTRATE 0.02 MG/ML
.01-.6 INJECTION, SOLUTION INTRAVENOUS CONTINUOUS
Status: DISCONTINUED | OUTPATIENT
Start: 2024-01-27 | End: 2024-01-28

## 2024-01-27 RX ORDER — CEFAZOLIN SODIUM 1 G/50ML
750 SOLUTION INTRAVENOUS EVERY 12 HOURS
Status: DISCONTINUED | OUTPATIENT
Start: 2024-01-28 | End: 2024-01-28

## 2024-01-27 RX ADMIN — VANCOMYCIN HYDROCHLORIDE 750 MG: 1 INJECTION, POWDER, LYOPHILIZED, FOR SOLUTION INTRAVENOUS at 23:39

## 2024-01-27 RX ADMIN — RIFAXIMIN 550 MG: 550 TABLET ORAL at 21:08

## 2024-01-27 RX ADMIN — INSULIN ASPART 1 UNITS: 100 INJECTION, SOLUTION INTRAVENOUS; SUBCUTANEOUS at 23:09

## 2024-01-27 RX ADMIN — PIPERACILLIN AND TAZOBACTAM 3.38 G: 3; .375 INJECTION, POWDER, FOR SOLUTION INTRAVENOUS at 21:09

## 2024-01-27 ASSESSMENT — ACTIVITIES OF DAILY LIVING (ADL)
ADLS_ACUITY_SCORE: 35

## 2024-01-27 NOTE — H&P
[facility]    History and Physical  [facility] Intensive Care     Date of Admission:  1/27/2024  Date of Service (when I saw the patient): 01/27/24    Assessment & Plan   Federico Bentley is a 58 year old male who presents with cough and fever 100.8 x 3 days and encephalopathy/confusion per wife. Cxr negative, Covid and flu negative. Recent TIPS procedure for portal hypertension     Neurology:  Encephalopathy: He had some confusion at home prior to presentation and his ammonia level was elevated.  He received lactulose in the ER and an ammonia level came down.  He is currently lucid.  -Mild hepatic encephalopathy is apparently not typical this soon after a TIPS so raises the question of decompensation due to acute infection?    Cardiovascular:  shock: he came here on 4 mcg/min  (=0.05 mcg/kg/min) now off. A typical blood pressure for his is ~100/60  -    Pulmonary:        CT chest done 1/26: showing Old granulomatous disease. Trace right pleural effusion. Minimal dependent atelectasis of the posterior lungs. No acute infiltrate to suggest pneumonia.   - patient reported cough and fever, negative flu, covid and rsv swab in ED  - he does smoke cigarettes but does not usually have a cough    Renal  Mild hyponatremia: likely related to liver disease  -normal creatinine    ID:         Mild temp elevation: empiric treatment for ?community acquired pneumonia  - he was treated with vanc and zosyn at [facility]  - no signs of ascites, will obtain cultures and scale back antibiotics as needed    GI  Cirrhosis with portal hypertension: complicated by esophageal varices  - underwent TIPS on 1/24 at [facility]  - elevated transaminases today, normal pre-procedure  - Patent TIPS shunt with normal velocities seen on Doppler at [facility] on 1/26  - ammonia level was 80 on initial presentation to [facility] (1/26) then down to 40 this morning  - given the proximity to TIPS and elevated transaminases/bili, I guess this is a  decompensated cirrhosis with possible infection trigger    Nutrition  Regular diet:   -    Endocrine:  Monitor glucose:   -    Heme/Onc:  leukocytosis: stable anemia and thrombocytopeni  -    DVT Prophylaxis: Pneumatic Compression Devices  GI Prophylaxis: Not indicated    Restraints: Restraints for medical healing needed: NO    Family update by me today: No    Aishwarya Jarvis MD    Time Spent on this Encounter   Billing:  I spent 30 minutes, not including any procedures, at the bedside and on the inpatient unit today managing the critical care of Federico Bentley in relation to the issues listed in this note.    Code Status   Full Code    Primary Care Physician   Chuck Shafer    Chief Complaint   Fever and cough after TIPS    History is obtained from the patient    History of Present Illness   Federico Bentley is a 58 year old male who presents with fever and cough after TIPS procedure.  He has been dealing with decompensated cirrhosis for several months.  He has been getting weekly paracentesis throughout October and November.  He was able to go a few weeks without 1 but then his last 1 was on 1/19 in anticipation of scheduled TIPS on 1/24.  He had had a little bit of a cough prior to the TIPS but it went away around the time of the procedure.  Yesterday, 2 days after his procedure his wife noticed he was confused at home and took him to the emergency department at [facility].  He spent approximately 20 hours in the ER at [facility] and was initially going to be transferred to the [facility] ICU but they did not have beds.  They did a Doppler of his TIPS which showed that it was patent.  They administered antibiotics and started a central line and started him on Levophed for hypotension.  They did a CT scan of the chest abdomen and pelvis which reportedly did not show any obvious signs of infection.  He does currently still have a productive cough and has been noted to have  low-grade temperature elevation in the ER.  It is not typical for him to have a cough although he does smoke cigarettes.  He has quit drinking since September.  He is still on beta-blockers for portal hypertension as he was not yet told to stop taking those.  Prior to this presentation, he has not had hepatic Encephalopathy or taken treatment for that.    Past Medical History    Alcoholic cirrhosis, diagnosed a few years ago.    Past Surgical History   Past surgical history reviewed with no previous surgeries identified.    Prior to Admission Medications   Prior to Admission Medications   Prescriptions Last Dose Informant Patient Reported? Taking?   Multiple Vitamins-Minerals (SUPER THERA PREET M) TABS   Yes No   Sig: Take 1 tablet by mouth daily   furosemide (LASIX) 20 MG tablet   No No   Sig: Take 2 tablets (40 mg) by mouth daily   metFORMIN (GLUCOPHAGE) 500 MG tablet   Yes Yes   Sig: Take 500 mg by mouth daily (with breakfast)   nadolol (CORGARD) 20 MG tablet   No No   Sig: Take 1 tablet (20 mg) by mouth daily   pantoprazole (PROTONIX) 40 MG EC tablet   No No   Sig: Take 1 tablet (40 mg) by mouth daily   spironolactone (ALDACTONE) 25 MG tablet   No No   Sig: Take 4 tablets (100 mg) by mouth daily      Facility-Administered Medications: None     Allergies   Allergies   Allergen Reactions    Influenza Virus Vaccine H5n1 Anaphylaxis     twice      Iodine Anaphylaxis, Hives and Nausea and Vomiting     Patient was given [id number] IV contrast for CT scan. Patient had immediate contrast reaction, symptoms include anaphylaxis, vomiting, hives, swollen lips and tongue. Patient was given 0.3mg Epi pen and 50 mg diphenhydramine via IV. Patient continued to have hives and vomiting, swollen lips and tongue, sent to ER. Per radiologist patient MUST be premedicated and scanned in a hospital setting due to reaction.    KM    Other Reaction(s): hives, swelling lips/tongue, vomiting, anaphylaxis    Patient was given [id number]  IV contrast for CT scan. Patient had immediate contrast reaction, symptoms include anaphylaxis, vomiting, hives, swollen lips and tongue. Patient was given 0.3mg Epi pen and 50 mg diphenhydramine via IV. Patient continued to have hives and vomiting, swollen lips and tongue, sent to ER. Per radiologist patient MUST be premedicated and scanned in a hospital setting due to reaction.  KM    Contrast Dye      Other Reaction(s): hives, swelling lips/tongue, vomiting, anaphylaxis       Social History   I have reviewed this patient's social history and updated it with pertinent information if needed. Federico SOMMERS Mc  reports that he has been smoking cigarettes. He has never used smokeless tobacco. He reports that he does not currently use alcohol. He reports that he does not currently use drugs.    Family History   Family history reviewed with patient and is noncontributory.    Review of Systems   The 10 point Review of Systems is negative other than noted in the HPI or here.     Physical Exam       No data recorded                Vital Signs with Ranges     0 lbs 0 oz    Awake, alert and following commands  PERRL and conjugate gaze  Lips are dry  Lungs clear  H-RR w/o murmur  Abdomen-soft, non tender, non distended.  Ultrasound exam shows no indication of ascites.  There is a small umbilical hernia  Extremities-warm and no edema  Central line insertion site without inflammation    Data   No results found for this or any previous visit (from the past 24 hour(s)).  Labs reviewed from care everywhere.

## 2024-01-27 NOTE — PHARMACY-ADMISSION MEDICATION HISTORY
Pharmacist Admission Medication History    Admission medication history is complete. The information provided in this note is only as accurate as the sources available at the time of the update.    Information Source(s): Patient and CareEverywhere/SureScripts via in-person    Pertinent Information: none    Changes made to PTA medication list:  Added: None  Deleted: mvi  Changed: metformin from 1g to 0.5g    Allergies reviewed with patient and updates made in EHR: yes    Medication History Completed By: Dread Bolden RPH 1/27/2024 5:55 PM    PTA Med List   Medication Sig Last Dose    furosemide (LASIX) 20 MG tablet Take 2 tablets (40 mg) by mouth daily 1/25/2024    metFORMIN (GLUCOPHAGE) 500 MG tablet Take 500 mg by mouth daily 1/25/2024    nadolol (CORGARD) 20 MG tablet Take 1 tablet (20 mg) by mouth daily 1/25/2024    pantoprazole (PROTONIX) 40 MG EC tablet Take 1 tablet (40 mg) by mouth daily 1/18/2024    spironolactone (ALDACTONE) 25 MG tablet Take 4 tablets (100 mg) by mouth daily 1/25/2024

## 2024-01-27 NOTE — LETTER
[facility] INTENSIVE [facility]  [address] [place] [address]  Phone: [phone]    January 28, 2024        Federico Bentley  [facility] [place] [address]          To whom it may concern:    RE: Federico Bentley    Federico is currently hospitalized and in the intensive care unit. He has been in the hospital since 1/26 but discharge is not expected today and anticipated discharge date is not yet known.           Sincerely,      Aishwarya Jarvis MD

## 2024-01-28 LAB
ALBUMIN SERPL BCG-MCNC: 2.4 G/DL (ref 3.5–5.2)
ALBUMIN SERPL BCG-MCNC: 2.5 G/DL (ref 3.5–5.2)
ALP SERPL-CCNC: 95 U/L (ref 40–150)
ALP SERPL-CCNC: 99 U/L (ref 40–150)
ALT SERPL W P-5'-P-CCNC: 410 U/L (ref 0–70)
ALT SERPL W P-5'-P-CCNC: 453 U/L (ref 0–70)
ANION GAP SERPL CALCULATED.3IONS-SCNC: 7 MMOL/L (ref 7–15)
ANION GAP SERPL CALCULATED.3IONS-SCNC: 8 MMOL/L (ref 7–15)
APTT PPP: 40 SECONDS (ref 22–38)
AST SERPL W P-5'-P-CCNC: 320 U/L (ref 0–45)
AST SERPL W P-5'-P-CCNC: 374 U/L (ref 0–45)
BACTERIA UR CULT: NO GROWTH
BASOPHILS # BLD AUTO: 0 10E3/UL (ref 0–0.2)
BASOPHILS NFR BLD AUTO: 0 %
BILIRUB DIRECT SERPL-MCNC: 2.45 MG/DL (ref 0–0.3)
BILIRUB SERPL-MCNC: 3.6 MG/DL
BILIRUB SERPL-MCNC: 4 MG/DL
BUN SERPL-MCNC: 11.2 MG/DL (ref 6–20)
BUN SERPL-MCNC: 11.7 MG/DL (ref 6–20)
CALCIUM SERPL-MCNC: 7.7 MG/DL (ref 8.6–10)
CALCIUM SERPL-MCNC: 7.7 MG/DL (ref 8.6–10)
CHLORIDE SERPL-SCNC: 104 MMOL/L (ref 98–107)
CHLORIDE SERPL-SCNC: 104 MMOL/L (ref 98–107)
CREAT SERPL-MCNC: 0.85 MG/DL (ref 0.67–1.17)
CREAT SERPL-MCNC: 0.89 MG/DL (ref 0.67–1.17)
DEPRECATED HCO3 PLAS-SCNC: 18 MMOL/L (ref 22–29)
DEPRECATED HCO3 PLAS-SCNC: 21 MMOL/L (ref 22–29)
EGFRCR SERPLBLD CKD-EPI 2021: >90 ML/MIN/1.73M2
EGFRCR SERPLBLD CKD-EPI 2021: >90 ML/MIN/1.73M2
EOSINOPHIL # BLD AUTO: 0.2 10E3/UL (ref 0–0.7)
EOSINOPHIL NFR BLD AUTO: 2 %
ERYTHROCYTE [DISTWIDTH] IN BLOOD BY AUTOMATED COUNT: 18.3 % (ref 10–15)
GLUCOSE BLDC GLUCOMTR-MCNC: 109 MG/DL (ref 70–99)
GLUCOSE BLDC GLUCOMTR-MCNC: 113 MG/DL (ref 70–99)
GLUCOSE BLDC GLUCOMTR-MCNC: 178 MG/DL (ref 70–99)
GLUCOSE BLDC GLUCOMTR-MCNC: 192 MG/DL (ref 70–99)
GLUCOSE BLDC GLUCOMTR-MCNC: 205 MG/DL (ref 70–99)
GLUCOSE BLDC GLUCOMTR-MCNC: 205 MG/DL (ref 70–99)
GLUCOSE SERPL-MCNC: 110 MG/DL (ref 70–99)
GLUCOSE SERPL-MCNC: 137 MG/DL (ref 70–99)
HCT VFR BLD AUTO: 28.8 % (ref 40–53)
HGB BLD-MCNC: 8.8 G/DL (ref 13.3–17.7)
IMM GRANULOCYTES # BLD: 0.1 10E3/UL
IMM GRANULOCYTES NFR BLD: 1 %
INR PPP: 1.48 (ref 0.85–1.15)
LYMPHOCYTES # BLD AUTO: 1.6 10E3/UL (ref 0.8–5.3)
LYMPHOCYTES NFR BLD AUTO: 15 %
MAGNESIUM SERPL-MCNC: 1.5 MG/DL (ref 1.7–2.3)
MAGNESIUM SERPL-MCNC: 2.4 MG/DL (ref 1.7–2.3)
MCH RBC QN AUTO: 26.2 PG (ref 26.5–33)
MCHC RBC AUTO-ENTMCNC: 30.6 G/DL (ref 31.5–36.5)
MCV RBC AUTO: 86 FL (ref 78–100)
MONOCYTES # BLD AUTO: 1.3 10E3/UL (ref 0–1.3)
MONOCYTES NFR BLD AUTO: 12 %
NEUTROPHILS # BLD AUTO: 7.6 10E3/UL (ref 1.6–8.3)
NEUTROPHILS NFR BLD AUTO: 70 %
NRBC # BLD AUTO: 0 10E3/UL
NRBC BLD AUTO-RTO: 0 /100
PHOSPHATE SERPL-MCNC: 1.8 MG/DL (ref 2.5–4.5)
PHOSPHATE SERPL-MCNC: 2.7 MG/DL (ref 2.5–4.5)
PLATELET # BLD AUTO: 68 10E3/UL (ref 150–450)
POTASSIUM SERPL-SCNC: 4 MMOL/L (ref 3.4–5.3)
POTASSIUM SERPL-SCNC: 4.5 MMOL/L (ref 3.4–5.3)
PROT SERPL-MCNC: 5.3 G/DL (ref 6.4–8.3)
PROT SERPL-MCNC: 5.6 G/DL (ref 6.4–8.3)
RBC # BLD AUTO: 3.36 10E6/UL (ref 4.4–5.9)
SODIUM SERPL-SCNC: 130 MMOL/L (ref 135–145)
SODIUM SERPL-SCNC: 132 MMOL/L (ref 135–145)
WBC # BLD AUTO: 10.7 10E3/UL (ref 4–11)

## 2024-01-28 PROCEDURE — 85025 COMPLETE CBC W/AUTO DIFF WBC: CPT | Performed by: INTERNAL MEDICINE

## 2024-01-28 PROCEDURE — 250N000013 HC RX MED GY IP 250 OP 250 PS 637: Performed by: INTERNAL MEDICINE

## 2024-01-28 PROCEDURE — 82248 BILIRUBIN DIRECT: CPT | Performed by: INTERNAL MEDICINE

## 2024-01-28 PROCEDURE — 99232 SBSQ HOSP IP/OBS MODERATE 35: CPT | Performed by: INTERNAL MEDICINE

## 2024-01-28 PROCEDURE — 99233 SBSQ HOSP IP/OBS HIGH 50: CPT | Performed by: INTERNAL MEDICINE

## 2024-01-28 PROCEDURE — 84100 ASSAY OF PHOSPHORUS: CPT | Performed by: INTERNAL MEDICINE

## 2024-01-28 PROCEDURE — 85610 PROTHROMBIN TIME: CPT | Performed by: INTERNAL MEDICINE

## 2024-01-28 PROCEDURE — 85730 THROMBOPLASTIN TIME PARTIAL: CPT | Performed by: INTERNAL MEDICINE

## 2024-01-28 PROCEDURE — 250N000011 HC RX IP 250 OP 636: Performed by: INTERNAL MEDICINE

## 2024-01-28 PROCEDURE — 80053 COMPREHEN METABOLIC PANEL: CPT | Performed by: INTERNAL MEDICINE

## 2024-01-28 PROCEDURE — 83735 ASSAY OF MAGNESIUM: CPT | Performed by: INTERNAL MEDICINE

## 2024-01-28 PROCEDURE — 258N000003 HC RX IP 258 OP 636: Performed by: INTERNAL MEDICINE

## 2024-01-28 PROCEDURE — 120N000001 HC R&B MED SURG/OB

## 2024-01-28 RX ORDER — PANTOPRAZOLE SODIUM 40 MG/1
40 TABLET, DELAYED RELEASE ORAL
Status: DISCONTINUED | OUTPATIENT
Start: 2024-01-29 | End: 2024-01-31 | Stop reason: HOSPADM

## 2024-01-28 RX ORDER — VANCOMYCIN HYDROCHLORIDE 1 G/200ML
1000 INJECTION, SOLUTION INTRAVENOUS EVERY 12 HOURS
Status: DISCONTINUED | OUTPATIENT
Start: 2024-01-28 | End: 2024-01-29

## 2024-01-28 RX ORDER — SODIUM CHLORIDE 9 MG/ML
INJECTION, SOLUTION INTRAVENOUS CONTINUOUS
Status: DISCONTINUED | OUTPATIENT
Start: 2024-01-28 | End: 2024-01-28

## 2024-01-28 RX ORDER — MAGNESIUM SULFATE HEPTAHYDRATE 40 MG/ML
4 INJECTION, SOLUTION INTRAVENOUS ONCE
Status: COMPLETED | OUTPATIENT
Start: 2024-01-28 | End: 2024-01-28

## 2024-01-28 RX ADMIN — POTASSIUM & SODIUM PHOSPHATES POWDER PACK 280-160-250 MG 2 PACKET: 280-160-250 PACK at 05:27

## 2024-01-28 RX ADMIN — POTASSIUM & SODIUM PHOSPHATES POWDER PACK 280-160-250 MG 2 PACKET: 280-160-250 PACK at 00:58

## 2024-01-28 RX ADMIN — PIPERACILLIN AND TAZOBACTAM 3.38 G: 3; .375 INJECTION, POWDER, FOR SOLUTION INTRAVENOUS at 15:20

## 2024-01-28 RX ADMIN — PIPERACILLIN AND TAZOBACTAM 3.38 G: 3; .375 INJECTION, POWDER, FOR SOLUTION INTRAVENOUS at 20:19

## 2024-01-28 RX ADMIN — INSULIN ASPART 1 UNITS: 100 INJECTION, SOLUTION INTRAVENOUS; SUBCUTANEOUS at 11:40

## 2024-01-28 RX ADMIN — PIPERACILLIN AND TAZOBACTAM 3.38 G: 3; .375 INJECTION, POWDER, FOR SOLUTION INTRAVENOUS at 08:35

## 2024-01-28 RX ADMIN — SODIUM CHLORIDE: 9 INJECTION, SOLUTION INTRAVENOUS at 00:57

## 2024-01-28 RX ADMIN — RIFAXIMIN 550 MG: 550 TABLET ORAL at 08:35

## 2024-01-28 RX ADMIN — MAGNESIUM SULFATE IN WATER 4 G: 40 INJECTION, SOLUTION INTRAVENOUS at 01:16

## 2024-01-28 RX ADMIN — POTASSIUM & SODIUM PHOSPHATES POWDER PACK 280-160-250 MG 2 PACKET: 280-160-250 PACK at 08:35

## 2024-01-28 RX ADMIN — INSULIN ASPART 2 UNITS: 100 INJECTION, SOLUTION INTRAVENOUS; SUBCUTANEOUS at 16:32

## 2024-01-28 RX ADMIN — VANCOMYCIN HYDROCHLORIDE 1000 MG: 1 INJECTION, SOLUTION INTRAVENOUS at 13:33

## 2024-01-28 RX ADMIN — INSULIN ASPART 2 UNITS: 100 INJECTION, SOLUTION INTRAVENOUS; SUBCUTANEOUS at 20:09

## 2024-01-28 RX ADMIN — RIFAXIMIN 550 MG: 550 TABLET ORAL at 20:19

## 2024-01-28 RX ADMIN — PIPERACILLIN AND TAZOBACTAM 3.38 G: 3; .375 INJECTION, POWDER, FOR SOLUTION INTRAVENOUS at 03:52

## 2024-01-28 ASSESSMENT — ACTIVITIES OF DAILY LIVING (ADL)
ADLS_ACUITY_SCORE: 20
ADLS_ACUITY_SCORE: 20
ADLS_ACUITY_SCORE: 36
ADLS_ACUITY_SCORE: 20

## 2024-01-28 NOTE — PLAN OF CARE
"Goal Outcome Evaluation:      Plan of Care Reviewed With: patient, spouse    Overall Patient Progress: improvingOverall Patient Progress: improving    Outcome Evaluation: vitals stable, BP 89/50 when up in chair, asymptomatic. BP otherwise /50-60s. Denies pain. Neuro exam intact, oriented x 4. Tolerating PO intake, voiding. 3 BM today. Transferring to .      Problem: Adult Inpatient Plan of Care  Goal: Plan of Care Review  Description: The Plan of Care Review/Shift note should be completed every shift.  The Outcome Evaluation is a brief statement about your assessment that the patient is improving, declining, or no change.  This information will be displayed automatically on your shift  note.  Outcome: Progressing  Flowsheets (Taken 1/28/2024 1644)  Outcome Evaluation: vitals stable, BP 89/50 when up in chair, asymptomatic. BP otherwise /50-60s. Denies pain. Neuro exam intact, oriented x 4. Tolerating PO intake, voiding. 3 BM today. Transferring to .  Plan of Care Reviewed With:   patient   spouse  Overall Patient Progress: improving  Goal: Patient-Specific Goal (Individualized)  Description: You can add care plan individualizations to a care plan. Examples of Individualization might be:  \"Parent requests to be called daily at 9am for status\", \"I have a hard time hearing out of my right ear\", or \"Do not touch me to wake me up as it startles  me\".  Outcome: Progressing  Goal: Absence of Hospital-Acquired Illness or Injury  Outcome: Progressing  Intervention: Identify and Manage Fall Risk  Recent Flowsheet Documentation  Taken 1/28/2024 1200 by Bryanna Mrate, RN  Safety Promotion/Fall Prevention:   safety round/check completed   nonskid shoes/slippers when out of bed   mobility aid in reach   lighting adjusted   assistive device/personal items within reach   activity supervised  Taken 1/28/2024 0800 by Bryanna Marte, RN  Safety Promotion/Fall Prevention:   safety round/check completed   nonskid " shoes/slippers when out of bed   mobility aid in reach   lighting adjusted   assistive device/personal items within reach   activity supervised  Intervention: Prevent Skin Injury  Recent Flowsheet Documentation  Taken 1/28/2024 1400 by Bryanna Marte RN  Body Position: position changed independently  Taken 1/28/2024 1200 by Bryanna Marte RN  Body Position: position changed independently  Taken 1/28/2024 1000 by Bryanna Marte RN  Body Position: position changed independently  Taken 1/28/2024 0800 by Bryanna Marte RN  Body Position: position changed independently  Intervention: Prevent and Manage VTE (Venous Thromboembolism) Risk  Recent Flowsheet Documentation  Taken 1/28/2024 1200 by Bryanna Marte RN  VTE Prevention/Management: patient refused intervention  Taken 1/28/2024 0800 by Bryanna Marte RN  VTE Prevention/Management: patient refused intervention  Intervention: Prevent Infection  Recent Flowsheet Documentation  Taken 1/28/2024 1200 by Bryanna Marte RN  Infection Prevention:   cohorting utilized   environmental surveillance performed   rest/sleep promoted  Taken 1/28/2024 0800 by Bryanna Marte RN  Infection Prevention:   cohorting utilized   environmental surveillance performed   rest/sleep promoted  Goal: Optimal Comfort and Wellbeing  Outcome: Met  Intervention: Provide Person-Centered Care  Recent Flowsheet Documentation  Taken 1/28/2024 1200 by Bryanna Marte RN  Trust Relationship/Rapport:   care explained   empathic listening provided   emotional support provided   questions answered   questions encouraged   reassurance provided   thoughts/feelings acknowledged  Taken 1/28/2024 0800 by Bryanna Marte RN  Trust Relationship/Rapport:   care explained   empathic listening provided   emotional support provided   questions answered   questions encouraged   reassurance provided   thoughts/feelings acknowledged

## 2024-01-28 NOTE — PHARMACY-VANCOMYCIN DOSING SERVICE
"Pharmacy Vancomycin Initial Note  Date of Service 2024  Patient's  1965  58 year old, male    Indication: Community Acquired Pneumonia and Urinary Tract Infection    Current estimated CrCl = 77ml/min based on SCr of 1.22       Vancomycin IV Administrations (past 72 hours)        24 2321 Vancomycin 1750mg IV    24 1202 Vancomycin 750mg IV            Nephrotoxins and other renal medications (From now, onward)      Start     Dose/Rate Route Frequency Ordered Stop    24 0000  vancomycin (VANCOCIN) 750 mg in sodium chloride 0.9 % 250 mL intermittent infusion         750 mg  over 60 Minutes Intravenous EVERY 12 HOURS 24 1901      24 1900  norepinephrine (LEVOPHED) 4 mg in  mL infusion PREMIX         0.01-0.6 mcg/kg/min × 76.7 kg  2.9-172.6 mL/hr  Intravenous CONTINUOUS 24 1834      24 1900  piperacillin-tazobactam (ZOSYN) 3.375 g vial to attach to  mL bag        Note to Pharmacy: For [facility], [facility] and [facility]: For Zosyn-naive patients, use the \"Zosyn initial dose + extended infusion\" order panel.    3.375 g  over 30 Minutes Intravenous EVERY 6 HOURS 24 1843              [employer] Prediction of Planned Initial Vancomycin Regimen  Regimen: 750 mg IV every 12 hours.  Start time: 00:02 on 2024  Exposure target: AUC24 (range)400-600 mg/L.hr   AUC24,ss: 486 mg/L.hr  Probability of AUC24 > 400: 70 %  Ctrough,ss: 16.2 mg/L  Probability of Ctrough,ss > 20: 31 %  Probability of nephrotoxicity (Lodise HAILEY ): 12 %          Plan:  Continue vancomycin  750 mg IV q12h from [facility].   Vancomycin monitoring method: AUC  Vancomycin therapeutic monitoring goal: 400-600 mg*h/L  Pharmacy will check vancomycin levels as appropriate in 1-3 Days.    Serum creatinine levels will be ordered every 48 hours.      Dread Bolden, PharmD  "

## 2024-01-28 NOTE — PROGRESS NOTES
[facility]    Medicine Progress Note - Hospitalist Service    Date of Admission:  1/27/2024    Assessment & Plan     58-year-old male with past medical history of cirrhosis s/p TIPS procedure on 1/24 presented with fever and cough along with altered mental status.  Patient admitted to the ICU due to concern for altered mental status/encephalopathy with low-grade fever of 100.8 for 3 days following which infectious workup was done with a chest x-ray which was negative, respiratory panel which was negative for COVID and flu, with no clear signs of ascites, cultures were drawn patient was started on IV antibiotics.  Initial ammonia level 80 on initial presentation to [facility] (1/26) then down to 40 this morning after lactulose administration.    Due to concern for recent TIPS procedure following which patient developed low-grade fever with possibility of an infectious trigger after discussion with [facility] hepatologist on call recommendation per the intensivist includes the patient can stop nadolol and continue other medications including diuretics.  Recommend to monitor overnight continue antibiotic and probably transition to oral antibiotics depending on outcome of cultures and discharge home with lactulose and rifaximin.  Call received from intensivist for transfer to the floor and monitoring overnight, patient currently stable for transfer is alert oriented no signs of confusion.     Cirrhosis with portal hypertension  Decompensated cirrhosis s/p TIPS  Possible hepatic encephalopathy  History of esophageal varices  CT chest done 1/26: showing Old granulomatous disease. Trace right pleural effusion. Minimal dependent atelectasis of the posterior lungs. No acute infiltrate to suggest pneumonia.     Bcx ordered and pending  IV vancomycin and zosyn awaiting Bcx  Continue pantoprazole and rifaximin  -pt will need lactulose at discharge currently having bowel movements hence not  scheduled,will need likely dosage adjust ment at discharge currently on PRN        Diet: Regular Diet Adult    DVT Prophylaxis: Pneumatic Compression Devices  Berger Catheter: Not present  Lines: PRESENT      CVC Triple Lumen Right Internal jugular-Site Assessment: WDL      Cardiac Monitoring: ACTIVE order. Indication: ICU  Code Status: Full Code      Clinically Significant Risk Factors Present on Admission            # Hypomagnesemia: Lowest Mg = 1.5 mg/dL in last 2 days, will replace as needed   # Hypoalbuminemia: Lowest albumin = 2.4 g/dL at 1/27/2024 11:33 PM, will monitor as appropriate  # Coagulation Defect: INR = 1.48 (Ref range: 0.85 - 1.15) and/or PTT = 40 Seconds (Ref range: 22 - 38 Seconds), will monitor for bleeding  # Thrombocytopenia: Lowest platelets = 61 in last 2 days, will monitor for bleeding     # Circulatory Shock: required vasopressors within past 24 hours      # DMII: A1C = 8.8 % (Ref range: <5.7 %) within past 6 months               Disposition Plan     Expected Discharge Date: 01/31/2024                    Perez Middleton  Hospitalist Service  [facility]  Securely message with [employer] (more info)  Text page via [employer] Paging/Directory   ______________________________________________________________________    Interval History   Pt is awake alert and no c/o chest pain/n/v,HAS DIARRHEA POST the lactulose.    Recent pertinent history  58-year-old male with a history of alcoholic liver cirrhosis with known varices and portal hypertension.  Ascites s/p TIPS at the [facility] by IR presented to the ER on 1/26 due to concern for altered mental status/confusion that has been progressively worse throughout the day.  Patient did have cough low grade fever with 200.8 Fahrenheit no abdominal pain no nausea or vomiting no sore throat headache nasal congestion no sick contacts.  In the ER he was given a dose of Zosyn while initial workup was being undertaken. His  laboratory evaluation is notable for a leukocytosis with a left shift, minimal hyponatremia, newly abnormal LFTs, and a lactic acidosis with a lactate of 2.8 initially. Additionally he has an elevated ammonia level at 85. He has not it seems had hepatic encephalopathy before and is not on lactulose currently. Blood cultures were sent. Chest x-ray shows no evidence of pneumonia and his urinalysis shows no evidence of infection. COVID, influenza, and RSV testing is negative. Concerning only the patient's LFTs just 2 days ago were normal with the exception of an albumin of 3.2. His alkaline phosphatase, total bilirubin, and AST/ALT were all normal. His INR was 1.32 days ago and his hemoglobin was 9.5 with platelets of 77. His INR is similar at 1.4 today. His hemoglobin has trended up slightly at 10.4, and his platelets are mildly lower at 67. His LFTs notably are newly abnormal with an ALT of 859, alkaline phosphatase of 125, AST of 1169, and direct bilirubin of 2.29 with a total bilirubin of 4.7. The patient was given a dose of oral lactulose.   CT abdomen pelvis was done as below  1.  Status post TIPS.   2.  Extensive nonspecific diminished attenuation throughout the right hepatic lobe. Considerations would include hepatitis, vascular insult, or possibly trauma from the recent TIPS procedure. There is no well-defined abscess or active bleeding site demonstrated.   3.  Cholelithiasis. No evidence of cholecystitis.   4.  Splenomegaly.   5.  Fluid containing umbilical hernia.   6.  Small amount of free intraperitoneal fluid.       Physical Exam   Vital Signs: Temp: 97.6  F (36.4  C) Temp src: Axillary BP: 96/58 Pulse: 72   Resp: 16 SpO2: 97 % O2 Device: None (Room air)    Weight: 177 lbs 7.52 oz    Constitutional: Awake, alert, cooperative, no apparent distress  Respiratory: Clear to auscultation bilaterally, no crackles or wheezing  Cardiovascular: Regular rate and rhythm, normal S1 and S2, and no murmur noted  GI:  Normal bowel sounds, soft, non-distended, non-tender  Skin/Integumen: No rashes, no cyanosis, no edema      Medical Decision Making

## 2024-01-28 NOTE — PLAN OF CARE
Goal Outcome Evaluation:      Plan of Care Reviewed With: patient    Overall Patient Progress: improvingOverall Patient Progress: improving    Outcome Evaluation: A/O x 4, VSS. Remains off levophed overnight, MAP > 65. Tele SR. LS clear on RA. NS infusing @ 50 mL/hr. Mg and P replaced per protocol. RIJ, keep in place per MD, plan to reassess this AM. Up w/ SBA. Tolerating regular diet.      Problem: Sepsis/Septic Shock  Goal: Optimal Coping  Outcome: Progressing     Problem: Sepsis/Septic Shock  Goal: Blood Glucose Level Within Targeted Range  Outcome: Progressing     Problem: Sepsis/Septic Shock  Goal: Absence of Infection Signs and Symptoms  Outcome: Progressing  Intervention: Initiate Sepsis Management  Recent Flowsheet Documentation  Taken 1/28/2024 0400 by Shawna Plata RN  Infection Prevention:   cohorting utilized   environmental surveillance performed   rest/sleep promoted  Taken 1/28/2024 0000 by Shawna Plata RN  Infection Prevention:   cohorting utilized   environmental surveillance performed   rest/sleep promoted  Taken 1/27/2024 2000 by Shawna Plata RN  Infection Prevention:   cohorting utilized   environmental surveillance performed   rest/sleep promoted  Intervention: Promote Recovery  Recent Flowsheet Documentation  Taken 1/28/2024 0400 by Shawna Plata RN  Activity Management: activity adjusted per tolerance  Taken 1/28/2024 0200 by Shawna Plata RN  Activity Management: ambulated to bathroom  Taken 1/28/2024 0000 by Shawna Plata RN  Activity Management: activity adjusted per tolerance  Taken 1/27/2024 2300 by Shawna Plata RN  Activity Management: ambulated to bathroom  Taken 1/27/2024 2200 by Shawna Plata RN  Activity Management: ambulated to bathroom  Taken 1/27/2024 2000 by Shawna Plata RN  Activity Management: activity adjusted per tolerance     Problem: Comorbidity Management  Goal: Blood Glucose Levels Within Targeted Range  Outcome:  Progressing

## 2024-01-28 NOTE — CARE PLAN
Patient admitted from [facility] ER. Arrived on low dose norepi, titrated off shortly after arrival. MD in to assess patient this evening, orders pending. Lungs clear, patient on room air. Tele SR. Patient updated his wife on transfer. He states no pain at this time.

## 2024-01-28 NOTE — PHARMACY-VANCOMYCIN DOSING SERVICE
Pharmacy Vancomycin Empiric Dose Change  Date of Service 2024  Patient's  1965   58 year old, male    Indication: Community Acquired Pneumonia and Urinary Tract Infection  Day of Therapy: 2    Original Dose Ordered: Vancomycin 750 mg IV q12h    [employer] Prediction of Planned New Vancomycin Regimen  Regimen: 750 mg IV every 12 hours.  Start time: 11:39 on 2024  Exposure target: AUC24 (range)400-600 mg/L.hr   AUC24,ss: 372 mg/L.hr  Probability of AUC24 > 400: 43 %  Ctrough,ss: 11.7 mg/L  Probability of Ctrough,ss > 20: 12 %  Probability of nephrotoxicity (Lodise HAILEY ): 7 %      Dose Changed To: Vancomycin 1000 mg IV q12h    [employer] Prediction of Planned New Vancomycin Regimen  Regimen: 1000 mg IV every 12 hours.  Start time: 11:39 on 2024  Exposure target: AUC24 (range)400-600 mg/L.hr   AUC24,ss: 492 mg/L.hr  Probability of AUC24 > 400: 71 %  Ctrough,ss: 15.4 mg/L  Probability of Ctrough,ss > 20: 30 %  Probability of nephrotoxicity (Lodise HAILEY ): 11 %    This dose change was based on the pharmacist's assessment of this patient's age, weight, concurrent drug therapy, treatment goals, whether patient's creatinine clearance adequately indicates renal function (factoring in age, muscle mass, fluid and clinical status), and, if applicable, prior pharmacokinetic data.    Estimated Creatinine Clearance: 105.5 mL/min (based on SCr of 0.89 mg/dL).  Will continue to follow and modify dosage according to levels, organ function and clinical condition  Racquel Tapia, PharmD, BCPS

## 2024-01-28 NOTE — PROGRESS NOTES
[facility]    History and Physical  [facility] Intensive Care     Date of Admission:  1/27/2024  Date of Service (when I saw the patient): 01/27/24    Assessment & Plan   Federico Bentley is a 58 year old male who presents with cough and fever 100.8 x 3 days and encephalopathy/confusion per wife. Cxr negative, Covid and flu negative. Recent TIPS procedure for portal hypertension     Neurology:  Encephalopathy: He had some confusion at home prior to presentation and his ammonia level was elevated.  He received lactulose in the ER and an ammonia level came down.  He is currently lucid.  -Mild hepatic encephalopathy is apparently not typical this soon after a TIPS so raises the question of decompensation due to acute infection?    Cardiovascular:  shock: he came here on 4 mcg/min  (=0.05 mcg/kg/min) now off. A typical blood pressure for his is ~100/60  - has been off of levophed essentially since arrival    Pulmonary:        CT chest done 1/26: showing Old granulomatous disease. Trace right pleural effusion. Minimal dependent atelectasis of the posterior lungs. No acute infiltrate to suggest pneumonia.   - patient reported cough and fever, negative flu, covid and rsv swab in ED  - he does smoke cigarettes but does not usually have a cough    Renal  Mild hyponatremia: likely related to liver disease  -normal creatinine    ID:         Mild temp elevation: empiric treatment for ?community acquired pneumonia  - he was treated with vanc and zosyn at [facility]  - no signs of ascites, will obtain cultures and scale back antibiotics as needed  - likely transition to oral antibiotics and continue those for a few more days    GI  Cirrhosis with portal hypertension: complicated by esophageal varices  - underwent TIPS on 1/24 at [facility]  - elevated transaminases today, normal pre-procedure  - Patent TIPS shunt with normal velocities seen on Doppler at [facility] on 1/26  - ammonia level was 80 on initial  presentation to [facility] (1/26) then down to 40 this morning  - given the proximity to TIPS and elevated transaminases/bili, I guess this is a decompensated cirrhosis with possible infection trigger  - I was able to speak with [place] hepatology - he can STOP NADOLOL, continue other meds including diuretics. He is recommended to be discharged with lactulose and rifaximin.     Nutrition  Regular diet:   -    Endocrine:  Monitor glucose:   -    Heme/Onc:  leukocytosis: stable anemia and thrombocytopenia  -    DVT Prophylaxis: Pneumatic Compression Devices  GI Prophylaxis: Not indicated    Restraints: Restraints for medical healing needed: NO    Family update by me today: No    Aishwarya Jarvis MD    Time Spent on this Encounter   Billing:  I spent 30 minutes, not including any procedures, at the bedside and on the inpatient unit today managing the critical care of Federico Bentley in relation to the issues listed in this note.    Code Status   Full Code    Primary Care Physician   Chuck Shafer    Chief Complaint   Fever and cough after TIPS    History is obtained from the patient    History of Present Illness   Federico Bentley is a 58 year old male who presents with fever and cough after TIPS procedure.  He has been dealing with decompensated cirrhosis for several months.  He has been getting weekly paracentesis throughout October and November.  He was able to go a few weeks without 1 but then his last 1 was on 1/19 in anticipation of scheduled TIPS on 1/24.  He had had a little bit of a cough prior to the TIPS but it went away around the time of the procedure.  Yesterday, 2 days after his procedure his wife noticed he was confused at home and took him to the emergency department at [facility].  He spent approximately 20 hours in the ER at [facility] and was initially going to be transferred to the [facility] ICU but they did not have beds.  They did a Doppler of his TIPS which showed  that it was patent.  They administered antibiotics and started a central line and started him on Levophed for hypotension.  They did a CT scan of the chest abdomen and pelvis which reportedly did not show any obvious signs of infection.  He does currently still have a productive cough and has been noted to have low-grade temperature elevation in the ER.  It is not typical for him to have a cough although he does smoke cigarettes.  He has quit drinking since September.  Prior to this presentation, he has not had hepatic Encephalopathy or taken treatment for that.    Past Medical History    Alcoholic cirrhosis, diagnosed a few years ago.    Past Surgical History   Past surgical history reviewed with no previous surgeries identified.    Prior to Admission Medications   Prior to Admission Medications   Prescriptions Last Dose Informant Patient Reported? Taking?   furosemide (LASIX) 20 MG tablet 1/25/2024  No Yes   Sig: Take 2 tablets (40 mg) by mouth daily   metFORMIN (GLUCOPHAGE) 500 MG tablet 1/25/2024  Yes Yes   Sig: Take 500 mg by mouth daily   nadolol (CORGARD) 20 MG tablet 1/25/2024  No Yes   Sig: Take 1 tablet (20 mg) by mouth daily   pantoprazole (PROTONIX) 40 MG EC tablet 1/18/2024  No Yes   Sig: Take 1 tablet (40 mg) by mouth daily   spironolactone (ALDACTONE) 25 MG tablet 1/25/2024  No Yes   Sig: Take 4 tablets (100 mg) by mouth daily      Facility-Administered Medications: None     Allergies   Allergies   Allergen Reactions    Influenza Virus Vaccine H5n1 Anaphylaxis     twice      Iodine Anaphylaxis, Nausea and Vomiting and Hives     Patient was given [id number] IV contrast for CT scan. Patient had immediate contrast reaction, symptoms include anaphylaxis, vomiting, hives, swollen lips and tongue. Patient was given 0.3mg Epi pen and 50 mg diphenhydramine via IV. Patient continued to have hives and vomiting, swollen lips and tongue, sent to ER. Per radiologist patient MUST be premedicated and scanned in a  hospital setting due to reaction. KM  Other Reaction(s): hives, swelling lips/tongue, vomiting, anaphylaxis    Contrast Dye      Other Reaction(s): hives, swelling lips/tongue, vomiting, anaphylaxis 2012.    1/26/24 Pt received iohexol 350 mgI/mL (OMNIPAQUE) at [facility]. Received Diphenhydramine 50mg IV prior. Pt tolerated contrast dye with no adverse effects.        Social History   I have reviewed this patient's social history and updated it with pertinent information if needed. Federico Bentley  reports that he has been smoking cigarettes. He has never used smokeless tobacco. He reports that he does not currently use alcohol. He reports that he does not currently use drugs.    Family History   Family history reviewed with patient and is noncontributory.    Review of Systems   The 10 point Review of Systems is negative other than noted in the HPI or here.     Physical Exam   Temp: 98.4  F (36.9  C) Temp src: Axillary Temp  Min: 98.4  F (36.9  C)  Max: 100.9  F (38.3  C) BP: 101/61 Pulse: 67   Resp: 27 SpO2: 96 % O2 Device: None (Room air)    Vital Signs with Ranges  Temp:  [98.4  F (36.9  C)-100.9  F (38.3  C)] 98.4  F (36.9  C)  Pulse:  [64-90] 67  Resp:  [12-30] 27  BP: ()/(47-96) 101/61  SpO2:  [94 %-99 %] 96 %  177 lbs 7.52 oz    Awake, alert and following commands  PERRL and conjugate gaze  Lips are dry  Lungs clear  H-RR w/o murmur  Abdomen-soft, non tender, non distended.  Ultrasound exam shows no indication of ascites.  There is a small umbilical hernia  Extremities-warm and no edema  Central line insertion site without inflammation    Data   Results for orders placed or performed during the hospital encounter of 01/27/24 (from the past 24 hour(s))   Glucose by meter   Result Value Ref Range    GLUCOSE BY METER POCT 142 (H) 70 - 99 mg/dL   Blood Culture Hand, Right    Specimen: Hand, Right; Blood   Result Value Ref Range    Culture No growth after 12 hours    Blood Culture Hand, Left     Specimen: Hand, Left; Blood   Result Value Ref Range    Culture No growth after 12 hours     Narrative    Only an Aerobic Blood Culture Bottle was collected, interpret results with caution.      Glucose by meter   Result Value Ref Range    GLUCOSE BY METER POCT 186 (H) 70 - 99 mg/dL   UA with Microscopic reflex to Culture    Specimen: Urine, Midstream   Result Value Ref Range    Color Urine Dark Yellow (A) Colorless, Straw, Light Yellow, Yellow    Appearance Urine Clear Clear    Glucose Urine Negative Negative mg/dL    Bilirubin Urine Small (A) Negative    Ketones Urine Negative Negative mg/dL    Specific Gravity Urine 1.034 1.003 - 1.035    Blood Urine Small (A) Negative    pH Urine 6.5 5.0 - 7.0    Protein Albumin Urine 10 (A) Negative mg/dL    Urobilinogen Urine 4.0 (A) Normal, 2.0 mg/dL    Nitrite Urine Negative Negative    Leukocyte Esterase Urine Negative Negative    RBC Urine 6 (H) <=2 /HPF    WBC Urine 1 <=5 /HPF    Squamous Epithelials Urine <1 <=1 /HPF    Narrative    Urine Culture not indicated   Glucose by meter   Result Value Ref Range    GLUCOSE BY METER POCT 143 (H) 70 - 99 mg/dL   Hemoglobin A1c   Result Value Ref Range    Hemoglobin A1C 8.8 (H) <5.7 %   Comprehensive metabolic panel   Result Value Ref Range    Sodium 130 (L) 135 - 145 mmol/L    Potassium 4.0 3.4 - 5.3 mmol/L    Carbon Dioxide (CO2) 18 (L) 22 - 29 mmol/L    Anion Gap 8 7 - 15 mmol/L    Urea Nitrogen 11.7 6.0 - 20.0 mg/dL    Creatinine 0.85 0.67 - 1.17 mg/dL    GFR Estimate >90 >60 mL/min/1.73m2    Calcium 7.7 (L) 8.6 - 10.0 mg/dL    Chloride 104 98 - 107 mmol/L    Glucose 137 (H) 70 - 99 mg/dL    Alkaline Phosphatase 95 40 - 150 U/L     (H) 0 - 45 U/L     (H) 0 - 70 U/L    Protein Total 5.3 (L) 6.4 - 8.3 g/dL    Albumin 2.4 (L) 3.5 - 5.2 g/dL    Bilirubin Total 3.6 (H) <=1.2 mg/dL   CBC with Platelets & Differential    Narrative    The following orders were created for panel order CBC with Platelets &  Differential.  Procedure                               Abnormality         Status                     ---------                               -----------         ------                     CBC with platelets and d...[[id number]]  Abnormal            Final result                 Please view results for these tests on the individual orders.   Magnesium   Result Value Ref Range    Magnesium 1.5 (L) 1.7 - 2.3 mg/dL   Phosphorus   Result Value Ref Range    Phosphorus 1.8 (L) 2.5 - 4.5 mg/dL   CBC with platelets and differential   Result Value Ref Range    WBC Count 10.5 4.0 - 11.0 10e3/uL    RBC Count 3.22 (L) 4.40 - 5.90 10e6/uL    Hemoglobin 8.5 (L) 13.3 - 17.7 g/dL    Hematocrit 27.5 (L) 40.0 - 53.0 %    MCV 85 78 - 100 fL    MCH 26.4 (L) 26.5 - 33.0 pg    MCHC 30.9 (L) 31.5 - 36.5 g/dL    RDW 17.9 (H) 10.0 - 15.0 %    Platelet Count 61 (L) 150 - 450 10e3/uL    % Neutrophils 69 %    % Lymphocytes 15 %    % Monocytes 13 %    % Eosinophils 2 %    % Basophils 0 %    % Immature Granulocytes 1 %    NRBCs per 100 WBC 0 <1 /100    Absolute Neutrophils 7.3 1.6 - 8.3 10e3/uL    Absolute Lymphocytes 1.6 0.8 - 5.3 10e3/uL    Absolute Monocytes 1.4 (H) 0.0 - 1.3 10e3/uL    Absolute Eosinophils 0.2 0.0 - 0.7 10e3/uL    Absolute Basophils 0.0 0.0 - 0.2 10e3/uL    Absolute Immature Granulocytes 0.1 <=0.4 10e3/uL    Absolute NRBCs 0.0 10e3/uL   Glucose by meter   Result Value Ref Range    GLUCOSE BY METER POCT 113 (H) 70 - 99 mg/dL   CBC with platelets differential    Narrative    The following orders were created for panel order CBC with platelets differential.  Procedure                               Abnormality         Status                     ---------                               -----------         ------                     CBC with platelets and d...[[id number]]  Abnormal            Final result                 Please view results for these tests on the individual orders.   Basic metabolic panel   Result Value Ref Range     Sodium 132 (L) 135 - 145 mmol/L    Potassium 4.5 3.4 - 5.3 mmol/L    Chloride 104 98 - 107 mmol/L    Carbon Dioxide (CO2) 21 (L) 22 - 29 mmol/L    Anion Gap 7 7 - 15 mmol/L    Urea Nitrogen 11.2 6.0 - 20.0 mg/dL    Creatinine 0.89 0.67 - 1.17 mg/dL    GFR Estimate >90 >60 mL/min/1.73m2    Calcium 7.7 (L) 8.6 - 10.0 mg/dL    Glucose 110 (H) 70 - 99 mg/dL   Hepatic panel   Result Value Ref Range    Protein Total 5.6 (L) 6.4 - 8.3 g/dL    Albumin 2.5 (L) 3.5 - 5.2 g/dL    Bilirubin Total 4.0 (H) <=1.2 mg/dL    Alkaline Phosphatase 99 40 - 150 U/L     (H) 0 - 45 U/L     (H) 0 - 70 U/L    Bilirubin Direct 2.45 (H) 0.00 - 0.30 mg/dL   INR   Result Value Ref Range    INR 1.48 (H) 0.85 - 1.15   Partial thromboplastin time   Result Value Ref Range    aPTT 40 (H) 22 - 38 Seconds   Magnesium   Result Value Ref Range    Magnesium 2.4 (H) 1.7 - 2.3 mg/dL   CBC with platelets and differential   Result Value Ref Range    WBC Count 10.7 4.0 - 11.0 10e3/uL    RBC Count 3.36 (L) 4.40 - 5.90 10e6/uL    Hemoglobin 8.8 (L) 13.3 - 17.7 g/dL    Hematocrit 28.8 (L) 40.0 - 53.0 %    MCV 86 78 - 100 fL    MCH 26.2 (L) 26.5 - 33.0 pg    MCHC 30.6 (L) 31.5 - 36.5 g/dL    RDW 18.3 (H) 10.0 - 15.0 %    Platelet Count 68 (L) 150 - 450 10e3/uL    % Neutrophils 70 %    % Lymphocytes 15 %    % Monocytes 12 %    % Eosinophils 2 %    % Basophils 0 %    % Immature Granulocytes 1 %    NRBCs per 100 WBC 0 <1 /100    Absolute Neutrophils 7.6 1.6 - 8.3 10e3/uL    Absolute Lymphocytes 1.6 0.8 - 5.3 10e3/uL    Absolute Monocytes 1.3 0.0 - 1.3 10e3/uL    Absolute Eosinophils 0.2 0.0 - 0.7 10e3/uL    Absolute Basophils 0.0 0.0 - 0.2 10e3/uL    Absolute Immature Granulocytes 0.1 <=0.4 10e3/uL    Absolute NRBCs 0.0 10e3/uL   Glucose by meter   Result Value Ref Range    GLUCOSE BY METER POCT 109 (H) 70 - 99 mg/dL   Glucose by meter   Result Value Ref Range    GLUCOSE BY METER POCT 178 (H) 70 - 99 mg/dL     Labs reviewed from care everywhere.

## 2024-01-29 ENCOUNTER — APPOINTMENT (OUTPATIENT)
Dept: ULTRASOUND IMAGING | Facility: CLINIC | Age: 59
End: 2024-01-29
Attending: PHYSICIAN ASSISTANT
Payer: COMMERCIAL

## 2024-01-29 LAB
ALBUMIN SERPL BCG-MCNC: 2.4 G/DL (ref 3.5–5.2)
ALP SERPL-CCNC: 108 U/L (ref 40–150)
ALT SERPL W P-5'-P-CCNC: 301 U/L (ref 0–70)
ANION GAP SERPL CALCULATED.3IONS-SCNC: 9 MMOL/L (ref 7–15)
AST SERPL W P-5'-P-CCNC: 148 U/L (ref 0–45)
BASOPHILS # BLD AUTO: 0 10E3/UL (ref 0–0.2)
BASOPHILS NFR BLD AUTO: 0 %
BILIRUB DIRECT SERPL-MCNC: 1.76 MG/DL (ref 0–0.3)
BILIRUB SERPL-MCNC: 2.8 MG/DL
BUN SERPL-MCNC: 9.3 MG/DL (ref 6–20)
CALCIUM SERPL-MCNC: 7.5 MG/DL (ref 8.6–10)
CHLORIDE SERPL-SCNC: 101 MMOL/L (ref 98–107)
CREAT SERPL-MCNC: 0.88 MG/DL (ref 0.67–1.17)
DEPRECATED HCO3 PLAS-SCNC: 19 MMOL/L (ref 22–29)
EGFRCR SERPLBLD CKD-EPI 2021: >90 ML/MIN/1.73M2
EOSINOPHIL # BLD AUTO: 0.2 10E3/UL (ref 0–0.7)
EOSINOPHIL NFR BLD AUTO: 3 %
ERYTHROCYTE [DISTWIDTH] IN BLOOD BY AUTOMATED COUNT: 19.1 % (ref 10–15)
GLUCOSE BLDC GLUCOMTR-MCNC: 141 MG/DL (ref 70–99)
GLUCOSE BLDC GLUCOMTR-MCNC: 161 MG/DL (ref 70–99)
GLUCOSE BLDC GLUCOMTR-MCNC: 184 MG/DL (ref 70–99)
GLUCOSE BLDC GLUCOMTR-MCNC: 188 MG/DL (ref 70–99)
GLUCOSE BLDC GLUCOMTR-MCNC: 198 MG/DL (ref 70–99)
GLUCOSE BLDC GLUCOMTR-MCNC: 238 MG/DL (ref 70–99)
GLUCOSE SERPL-MCNC: 245 MG/DL (ref 70–99)
HCT VFR BLD AUTO: 26.9 % (ref 40–53)
HGB BLD-MCNC: 8.4 G/DL (ref 13.3–17.7)
IMM GRANULOCYTES # BLD: 0.1 10E3/UL
IMM GRANULOCYTES NFR BLD: 1 %
LYMPHOCYTES # BLD AUTO: 1 10E3/UL (ref 0.8–5.3)
LYMPHOCYTES NFR BLD AUTO: 15 %
MAGNESIUM SERPL-MCNC: 1.7 MG/DL (ref 1.7–2.3)
MCH RBC QN AUTO: 26.8 PG (ref 26.5–33)
MCHC RBC AUTO-ENTMCNC: 31.2 G/DL (ref 31.5–36.5)
MCV RBC AUTO: 86 FL (ref 78–100)
MONOCYTES # BLD AUTO: 0.8 10E3/UL (ref 0–1.3)
MONOCYTES NFR BLD AUTO: 12 %
MRSA DNA SPEC QL NAA+PROBE: NEGATIVE
NEUTROPHILS # BLD AUTO: 4.5 10E3/UL (ref 1.6–8.3)
NEUTROPHILS NFR BLD AUTO: 69 %
NRBC # BLD AUTO: 0 10E3/UL
NRBC BLD AUTO-RTO: 0 /100
PHOSPHATE SERPL-MCNC: 2.5 MG/DL (ref 2.5–4.5)
PLATELET # BLD AUTO: 66 10E3/UL (ref 150–450)
POTASSIUM SERPL-SCNC: 4 MMOL/L (ref 3.4–5.3)
PROT SERPL-MCNC: 5.5 G/DL (ref 6.4–8.3)
RBC # BLD AUTO: 3.13 10E6/UL (ref 4.4–5.9)
SA TARGET DNA: NEGATIVE
SODIUM SERPL-SCNC: 129 MMOL/L (ref 135–145)
WBC # BLD AUTO: 6.7 10E3/UL (ref 4–11)

## 2024-01-29 PROCEDURE — 36415 COLL VENOUS BLD VENIPUNCTURE: CPT | Performed by: INTERNAL MEDICINE

## 2024-01-29 PROCEDURE — 83735 ASSAY OF MAGNESIUM: CPT | Performed by: INTERNAL MEDICINE

## 2024-01-29 PROCEDURE — 85025 COMPLETE CBC W/AUTO DIFF WBC: CPT | Performed by: INTERNAL MEDICINE

## 2024-01-29 PROCEDURE — 87640 STAPH A DNA AMP PROBE: CPT | Performed by: INTERNAL MEDICINE

## 2024-01-29 PROCEDURE — 87641 MR-STAPH DNA AMP PROBE: CPT | Performed by: INTERNAL MEDICINE

## 2024-01-29 PROCEDURE — 84100 ASSAY OF PHOSPHORUS: CPT | Performed by: INTERNAL MEDICINE

## 2024-01-29 PROCEDURE — 250N000011 HC RX IP 250 OP 636: Performed by: INTERNAL MEDICINE

## 2024-01-29 PROCEDURE — 250N000011 HC RX IP 250 OP 636: Performed by: PHYSICIAN ASSISTANT

## 2024-01-29 PROCEDURE — 99233 SBSQ HOSP IP/OBS HIGH 50: CPT | Performed by: INTERNAL MEDICINE

## 2024-01-29 PROCEDURE — 80053 COMPREHEN METABOLIC PANEL: CPT | Performed by: INTERNAL MEDICINE

## 2024-01-29 PROCEDURE — 99223 1ST HOSP IP/OBS HIGH 75: CPT | Performed by: PHYSICIAN ASSISTANT

## 2024-01-29 PROCEDURE — 76705 ECHO EXAM OF ABDOMEN: CPT

## 2024-01-29 PROCEDURE — 250N000013 HC RX MED GY IP 250 OP 250 PS 637: Performed by: INTERNAL MEDICINE

## 2024-01-29 PROCEDURE — 120N000001 HC R&B MED SURG/OB

## 2024-01-29 PROCEDURE — 99418 PROLNG IP/OBS E/M EA 15 MIN: CPT | Performed by: PHYSICIAN ASSISTANT

## 2024-01-29 RX ORDER — FUROSEMIDE 20 MG
20 TABLET ORAL DAILY
Status: DISCONTINUED | OUTPATIENT
Start: 2024-01-29 | End: 2024-01-31 | Stop reason: HOSPADM

## 2024-01-29 RX ORDER — ALBUMIN (HUMAN) 12.5 G/50ML
50 SOLUTION INTRAVENOUS ONCE
Status: COMPLETED | OUTPATIENT
Start: 2024-01-29 | End: 2024-01-29

## 2024-01-29 RX ORDER — NICOTINE POLACRILEX 4 MG
15-30 LOZENGE BUCCAL
Status: DISCONTINUED | OUTPATIENT
Start: 2024-01-29 | End: 2024-01-31 | Stop reason: HOSPADM

## 2024-01-29 RX ORDER — CEFTRIAXONE 2 G/1
2 INJECTION, POWDER, FOR SOLUTION INTRAMUSCULAR; INTRAVENOUS EVERY 24 HOURS
Status: DISCONTINUED | OUTPATIENT
Start: 2024-01-29 | End: 2024-01-30

## 2024-01-29 RX ORDER — DEXTROSE MONOHYDRATE 25 G/50ML
25-50 INJECTION, SOLUTION INTRAVENOUS
Status: DISCONTINUED | OUTPATIENT
Start: 2024-01-29 | End: 2024-01-31 | Stop reason: HOSPADM

## 2024-01-29 RX ORDER — GLIPIZIDE 5 MG/1
5 TABLET ORAL
Status: DISCONTINUED | OUTPATIENT
Start: 2024-01-29 | End: 2024-01-29

## 2024-01-29 RX ADMIN — CEFTRIAXONE SODIUM 2 G: 2 INJECTION, POWDER, FOR SOLUTION INTRAMUSCULAR; INTRAVENOUS at 14:41

## 2024-01-29 RX ADMIN — RIFAXIMIN 550 MG: 550 TABLET ORAL at 20:47

## 2024-01-29 RX ADMIN — PANTOPRAZOLE SODIUM 40 MG: 40 TABLET, DELAYED RELEASE ORAL at 06:41

## 2024-01-29 RX ADMIN — PIPERACILLIN AND TAZOBACTAM 3.38 G: 3; .375 INJECTION, POWDER, FOR SOLUTION INTRAVENOUS at 08:32

## 2024-01-29 RX ADMIN — VANCOMYCIN HYDROCHLORIDE 1000 MG: 1 INJECTION, SOLUTION INTRAVENOUS at 11:50

## 2024-01-29 RX ADMIN — METFORMIN HYDROCHLORIDE 500 MG: 500 TABLET, FILM COATED ORAL at 17:10

## 2024-01-29 RX ADMIN — LACTULOSE 20 G: 10 SOLUTION ORAL at 22:24

## 2024-01-29 RX ADMIN — RIFAXIMIN 550 MG: 550 TABLET ORAL at 08:32

## 2024-01-29 RX ADMIN — PIPERACILLIN AND TAZOBACTAM 3.38 G: 3; .375 INJECTION, POWDER, FOR SOLUTION INTRAVENOUS at 03:19

## 2024-01-29 RX ADMIN — INSULIN ASPART 1 UNITS: 100 INJECTION, SOLUTION INTRAVENOUS; SUBCUTANEOUS at 08:32

## 2024-01-29 RX ADMIN — INSULIN ASPART 2 UNITS: 100 INJECTION, SOLUTION INTRAVENOUS; SUBCUTANEOUS at 00:19

## 2024-01-29 RX ADMIN — VANCOMYCIN HYDROCHLORIDE 1000 MG: 1 INJECTION, SOLUTION INTRAVENOUS at 00:31

## 2024-01-29 RX ADMIN — INSULIN ASPART 1 UNITS: 100 INJECTION, SOLUTION INTRAVENOUS; SUBCUTANEOUS at 04:43

## 2024-01-29 RX ADMIN — METFORMIN HYDROCHLORIDE 500 MG: 500 TABLET, FILM COATED ORAL at 09:55

## 2024-01-29 ASSESSMENT — ACTIVITIES OF DAILY LIVING (ADL)
ADLS_ACUITY_SCORE: 20

## 2024-01-29 NOTE — PLAN OF CARE
Goal Outcome Evaluation:         1/28 9958-0410    Orientation: A&Ox4  Activity: Independent  Diet/BS Checks: Regular; BG checks before meals  Tele: N/A  IV Access/Drains: R PIV  Pain Management: Denies  Abnormal VS/Results: VSS ex soft BP; K and Phos replaced, recheck tomorrow  Bowel/Bladder: Continent of B/B  Skin/Wounds: Slight jaundice  D/C Disposition: Possibly tomorrow  Other Info: Pt transferred from [place] ER to [facility] ICU, transferred to [address] approx 6pm

## 2024-01-29 NOTE — PLAN OF CARE
19:00-07:30  Orientation: A&Ox4  Activity: Independent  Diet/BS Checks: Regular, carb count. Q4H BG checks x48H  Tele: N/A  IV Access/Drains: R PIV SL'd  Pain Management: Denied  Abnormal VS/Results: On K, Mg, and P protocols. Ca: 7.7, ALT: 410, AST: 320  Bowel/Bladder: Continent of B/B, several BMs during the shift  Skin/Wounds: Slightly jaundiced. 1+ edema to legs  D/C Disposition: Pending

## 2024-01-29 NOTE — PROGRESS NOTES
[facility]    Medicine Progress Note - Hospitalist Service    Date of Admission:  1/27/2024    Assessment & Plan   58-year-old male with past medical history of cirrhosis s/p TIPS procedure on 1/24 presented with fever and cough along with altered mental status.     Cirrhosis with portal hypertension  Decompensated cirrhosis s/p TIPS on 1/24/2024  Possible hepatic encephalopathy  History of esophageal varices with bleeding, recently banded December 2023  Acute transaminitis and hyperbilirubinemia likely from recently TIPS start patient on low-dose   Patient initially admitted to the ICU due to hypotension needing pressors due to concern for altered mental status/encephalopathy with low-grade fever of 100.8 for 3 days  -Infectious workup was done with a chest x-ray which was negative, respiratory panel which was negative for COVID and flu, with no clear signs of ascites, cultures were drawn patient was started on IV antibiotics.    -Initial ammonia level 80 on initial presentation to [facility] (1/26) then down to 40   CT chest done 1/26: showing Old granulomatous disease. Trace right pleural effusion. Minimal dependent atelectasis of the posterior lungs. No acute infiltrate to suggest pneumonia.   -Patient was started on vancomycin and Zosyn in the ICU,   -Blood culture negative to date  -Blood pressure still soft, PTA on nadolol, Lasix 40 mg twice daily and spironolactone 100 mg daily, which currently is on hold  -Start patient Lasix 20 mg daily, gradually uptitrate as blood pressure allows  -Started on rifaximin 550 mg twice daily, has more than 3 bowel movements per day currently so lactulose is a as needed  -Patient is currently alert and oriented x 3  -Given recent TI PS, consulted and discussed plan of care with jessica [facility]  - antibiotic now changed to ceftriaxone  -Diagnostic and therapeutic paracentesis ordered with albumin 50 g  -Monitor LFTs, seems to be gradually  improving    Anemia  Thrombocytopenia  -Appears chronic with baseline hemoglobin around 8 and platelet 60s-70s, hemoglobin around his baseline and patient denies any active bleed  -Monitor intermittently    Type 2 diabetes mellitus   -Hemoglobin A1c 8.8, PTA on metformin, resume  -SSI with medium sliding scale while in the hospital    Hyponatremia, mild  Monitor        Diet: Regular Diet Adult    DVT Prophylaxis: Pneumatic Compression Devices  Berger Catheter: Not present  Lines: None     Cardiac Monitoring: ACTIVE order. Indication: ICU  Code Status: Full Code      Clinically Significant Risk Factors            # Hypomagnesemia: Lowest Mg = 1.5 mg/dL in last 2 days, will replace as needed   # Hypoalbuminemia: Lowest albumin = 2.4 g/dL at 1/27/2024 11:33 PM, will monitor as appropriate  # Coagulation Defect: INR = 1.48 (Ref range: 0.85 - 1.15) and/or PTT = 40 Seconds (Ref range: 22 - 38 Seconds), will monitor for bleeding  # Thrombocytopenia: Lowest platelets = 61 in last 2 days, will monitor for bleeding         # DMII: A1C = 8.8 % (Ref range: <5.7 %) within past 6 months, PRESENT ON ADMISSION             Disposition Plan      Expected Discharge Date: 01/29/2024                    Elvira Penny MD  Hospitalist Service  [facility]  Securely message with [employer] (more info)  Text page via [employer] Paging/Directory   ______________________________________________________________________    Interval History   Care assumed, chart reviewed.  Patient reports feeling better.  Denies any dizziness or lightheadedness.  Now oriented x 4.  No other nursing concerns.    Physical Exam   Vital Signs: Temp: 97.3  F (36.3  C) Temp src: Oral BP: 95/50 Pulse: 70   Resp: 16 SpO2: 98 % O2 Device: None (Room air)    Weight: 182 lbs 6.4 oz    Exam:  Constitutional: Awake, alert and no distress. Appears comfortable  Head: Normocephalic. No masses, lesions, tenderness or abnormalities  ENMT: ENT exam normal, no neck  nodes or sinus tenderness  Cardiovascular: RRR.  no murmurs, no rubs or JVD  Respiratory:normal WOB,b/l equal air entry, no wheezes or crackles   Gastrointestinal: Abdomen soft, non-tender. BS normal. No masses, organomegaly  : Deferred  Extremities : No edema , no clubbing or cyanosis      Medical Decision Making       52 MINUTES SPENT BY ME on the date of service doing chart review, history, exam, documentation & further activities per the note.      Data     I have personally reviewed the following data over the past 24 hrs:    6.7  \   8.4 (L)   / 66 (L)     129 (L) 101 9.3 /  238 (H)   4.0 19 (L) 0.88 \     ALT: 301 (H) AST: 148 (H) AP: 108 TBILI: 2.8 (H)   ALB: 2.4 (L) TOT PROTEIN: 5.5 (L) LIPASE: N/A       Imaging results reviewed over the past 24 hrs:   No results found for this or any previous visit (from the past 24 hour(s)).  Recent Labs   Lab 01/29/24  1159 01/29/24  0934 01/29/24  0816 01/28/24  0813 01/28/24  0545 01/28/24  0356 01/27/24  2333 01/24/24  0731 01/24/24  0658   WBC  --  6.7  --   --  10.7  --  10.5  --  5.4   HGB  --  8.4*  --   --  8.8*  --  8.5*  --  9.5*   MCV  --  86  --   --  86  --  85  --  86   PLT  --  66*  --   --  68*  --  61*  --  77*   INR  --   --   --   --  1.48*  --   --   --  1.29*   NA  --  129*  --   --  132*  --  130*  --  133*   POTASSIUM  --  4.0  --   --  4.5  --  4.0  --  4.4   CHLORIDE  --  101  --   --  104  --  104  --  100   CO2  --  19*  --   --  21*  --  18*  --  24   BUN  --  9.3  --   --  11.2  --  11.7  --  19.9   CR  --  0.88  --   --  0.89  --  0.85  --  0.94   ANIONGAP  --  9  --   --  7  --  8  --  9   CONSTANZA  --  7.5*  --   --  7.7*  --  7.7*  --  8.6   * 245* 184*   < > 110*   < > 137*   < > 249*   ALBUMIN  --  2.4*  --   --  2.5*  --  2.4*  --  3.2*   PROTTOTAL  --  5.5*  --   --  5.6*  --  5.3*  --  6.8   BILITOTAL  --  2.8*  --   --  4.0*  --  3.6*  --  1.0   ALKPHOS  --  108  --   --  99  --  95  --  95   ALT  --  301*  --   --  410*  --   453*  --  25   AST  --  148*  --   --  320*  --  374*  --  42    < > = values in this interval not displayed.

## 2024-01-29 NOTE — CONSULTS
"  Gastroenterology Consultation      Date of Admission:  1/27/2024  Reason for Admission: Fever  Date of Consult  1/29/2024   Requesting Physician:  Aishwarya Jarvis MD           ASSESSMENT AND RECOMMENDATIONS:   Assessment:  58 year old male with a history of alcohol use disorder, alcohol related decompensated cirrhosis with variceal bleeding (recent banding 12/2023), ascites s/p TIPS 1/24/24, who was admitted to [facility] ICU from [facility] with shock requiring pressors, altered mental status, cough, fever. GI consulted for \"recent TIPS, now with hepatic encephalopathy\".     # Alcohol related decompensated cirrhosis with variceal bleeding (recently banded 12/2023)  # Ascites s/p TIPS 1/24/24  # Elevated LFTs  Patient with recent TIPS on 1/24/24. US abd TIPS doppler 1/26 with patent TIPS, small amount of ascites, other patent vasculature. LFTs with Alk phos 99, , , T bili 4.0, direct bili 2.45. Notably LFTs normal on 1/24. Elevation of LFTs likely due to recent TIPS vs shock vs infection. PTA on lasix 40mg daily, spironolactone 100mg daily. PTA patient on Nadolol, this was discontinued. Patient with worsening abd distention and pressure today   - Currently on lasix 20mg daily, pending blood pressure, will likely resume spironolactone   - Diagnostic and therapeutic paracentesis with albumin 50g   - Will treat empirically for SBP with ceftriaxone 2g. Patient will likely need to be on SBP ppx at discharge   - Monitor LFTs  - Low Na diet     # Shock  # Fevers    Patient on pressors when admitted to ICU, now off levophed. Respiratory panel negative. CT chest with old granulomatous disease, trace right pleural effusion, minimal dependent atelectasis. CT A/P with extensive nonspecific diminished attenuation throughout right hepatic lobe, cholelithiasis with no evidence of cholecystitis. Started on empiric antibiotics with vancomycin and zosyn. Bcx NGTD. Urine culture NGTD.   - Follow final Bcx   - As above, will " "empirically treat for SBP with ceftriaxone    # Encephalopathy  Per patient's wife, he had some confusion prior to admission. No imaging of brain obtained. Ammonia elevated on admission. Encephalopathy possible due to hepatic encephalopathy vs infection. Given dose of lactulose in the ER and started on rifaximin 550mg BID. He continues to have frequent bowel movements. Encephalopathy has resolved and patient with alert and oriented x3.   - Continue lactulose, currently PRN as patient is having >3 stools daily. Would titrate to goal 3-4 soft bowel movements daily.    - Continue rifaximin 550mg BID       # GERD   On omeprazole 20mg daily   - PPI daily       Gastroenterology follow up recommendations: TBD pending inpatient course. Next outpatient hepatology appt 4/15/24    Thank you for involving us in this patient's care. Please do not hesitate to contact the GI service with any questions or concerns.     Pt seen and care plan discussed with Dr. Echavarria, GI staff physician.      Overall time spent on the date of this encounter preparing to see the patient (including chart review of available notes, clinical status events, imaging and labs); obtaining and/or reviewing separately obtained history; ordering medications, tests or procedures; communicating with other health care professionals; and documenting the above clinical information in the electronic medical record was 90 minutes.      Mary Loera PA-C  GI Service  [facility]  Text Page (Monday-Friday, 8am-4pm)    To page the On-Call [facility] GI provider 24 hours a day, please click [facility] and select GASTROENTEROLOGY MEDICINE ADULT / [facility] in the drop-down menu.   -------------------------------------------------------------------------------------------------------------------       Reason for Consultation:   We were asked by Aishwarya Jarvis MD of medicine to evaluate this patient with \"recent TIPS, now with hepatic " "encephalopathy\".     History is obtained from the patient and the medical record.            History of Present Illness:     Federico Bentley is a 58 year old male with a PMH significant for alcohol use disorder, alcohol related decompensated cirrhosis with variceal bleeding (recent banding 12/2023), ascites s/p TIPS 1/24/24, who was admitted to [facility] ICU from [facility] with shock requiring pressors, altered mental status, cough, fever.     Patient reports that he has a standing order for paracentesis weekly outpatient. He last had a paracentesis on on 1/19 with 1.5L removed per patient. Prior that he was able to go without a paracentesis for 3 weeks. He underwent a TIPS placement on 1/24 and reportedly recovered well following that and went home. 2 days after that, his wife noticed that he was confused and disoriented. She states he was trying to drink out of a spoon and she knew it was time to take him in to the ED. He reported a cough at home but otherwise had no other infectious symptoms. He reports at baseline he has 3-5 stools daily. No recent black or bloody stools.     In [facility] ED, he was found to have a temp of 100.9F. He was started on empiric antibiotics with vancomycin and zosyn. Imaging was obtained with US abd with TIPS doppler which showed patent TIPS, and small amount of ascites. CT A/P showed no evidence of obvious infection. He was hypotensive and started on levophed via central line. He was transferred to [facility] ICU for further workup.    Currently, he reports feeling more distended in his abdomen than yesterday, feels as though he has fluid on his belly. He also reports worsening lower extremity edema since yesterday. He denies abdominal pain, just \"pressure\". No nausea, vomiting. He has been tolerating diet without issues. He has been having multiple loose bowel movements daily, no black or bloody stools. Has been afebrile since being at [facility]. He reports smoking cigarettes. Last drink of alcohol " was in September. He reports taking lactulose years ago and has not had hepatic encephalopathy since then.     Previous Procedures:  TIPS 1/24/24 at [facility]     EGD on 12/11/2023  Impression:            - Grade II esophageal varices. Incompletely                          eradicated. Banded.                          - Esophageal ulcer at site of prior variceal treatment.                          - Portal hypertensive gastropathy.                          - Normal examined duodenum.                          - No specimens collected.       EGD on 10/9/2020  Impression:          - Bleeding large (> 5 mm) esophageal varices. Completely                        eradicated. Bandedx3.                        - Clotted blood in the gastric fundus and in the gastric                        body.                        - No specimens collected.           Past Medical History:   Reviewed and edited as appropriate  Past Medical History:   Diagnosis Date    Alcoholic cirrhosis (H)     Diabetes (H)     Portal hypertension (H)     UGIB (upper gastrointestinal bleed)             Past Surgical History:   Reviewed and edited as appropriate   Past Surgical History:   Procedure Laterality Date    APPENDECTOMY      COLECTOMY PARTIAL      COLONOSCOPY      ESOPHAGOSCOPY, GASTROSCOPY, DUODENOSCOPY (EGD), COMBINED N/A 10/09/2020    Procedure: ESOPHAGOGASTRODUODENOSCOPY (EGD);  Surgeon: Mary Wheatley DO;  Location: [facility] GI    IR TRANSVEN INTRAHEPATIC PORTOSYST SHUNT  1/24/2024              Social History:   Reviewed and edited as appropriate  Social History     Socioeconomic History    Marital status:      Spouse name: Not on file    Number of children: Not on file    Years of education: Not on file    Highest education level: Not on file   Occupational History    Not on file   Tobacco Use    Smoking status: Every Day     Types: Cigarettes    Smokeless tobacco: Never   Vaping Use    Vaping Use: Never used   Substance and Sexual Activity     Alcohol use: Not Currently     Comment: Sober since 9/16/23    Drug use: Not Currently    Sexual activity: Not on file   Other Topics Concern    Not on file   Social History Narrative    Not on file     Social Determinants of Health     Financial Resource Strain: Not on file   Food Insecurity: Not on file   Transportation Needs: Not on file   Physical Activity: Not on file   Stress: Not on file   Social Connections: Not on file   Interpersonal Safety: Not on file   Housing Stability: Not on file              Family History:   Patient's family history is reviewed today and is non-contributory            Allergies:   Reviewed and edited as appropriate     Allergies   Allergen Reactions    Influenza Virus Vaccine H5n1 Anaphylaxis     twice      Iodine Anaphylaxis, Nausea and Vomiting and Hives     Patient was given [id number] IV contrast for CT scan. Patient had immediate contrast reaction, symptoms include anaphylaxis, vomiting, hives, swollen lips and tongue. Patient was given 0.3mg Epi pen and 50 mg diphenhydramine via IV. Patient continued to have hives and vomiting, swollen lips and tongue, sent to ER. Per radiologist patient MUST be premedicated and scanned in a hospital setting due to reaction. KM  Other Reaction(s): hives, swelling lips/tongue, vomiting, anaphylaxis    Contrast Dye      Other Reaction(s): hives, swelling lips/tongue, vomiting, anaphylaxis 2012.    1/26/24 Pt received iohexol 350 mgI/mL (OMNIPAQUE) at [facility]. Received Diphenhydramine 50mg IV prior. Pt tolerated contrast dye with no adverse effects.             Medications:     Medications Prior to Admission   Medication Sig Dispense Refill Last Dose    furosemide (LASIX) 20 MG tablet Take 2 tablets (40 mg) by mouth daily 90 tablet 1 1/25/2024    metFORMIN (GLUCOPHAGE) 500 MG tablet Take 500 mg by mouth daily   1/25/2024    nadolol (CORGARD) 20 MG tablet Take 1 tablet (20 mg) by mouth daily 30 tablet 0 1/25/2024    pantoprazole  (PROTONIX) 40 MG EC tablet Take 1 tablet (40 mg) by mouth daily 90 tablet 1 1/18/2024    spironolactone (ALDACTONE) 25 MG tablet Take 4 tablets (100 mg) by mouth daily 180 tablet 1 1/25/2024             Review of Systems:     A complete review of systems was performed and is negative except as noted in the HPI              Physical Exam:   Temp: 97.3  F (36.3  C) Temp src: Oral BP: 95/50 Pulse: 70   Resp: 16 SpO2: 98 % O2 Device: None (Room air)    Wt:   Wt Readings from Last 2 Encounters:   01/29/24 82.7 kg (182 lb 6.4 oz)   01/24/24 76.7 kg (169 lb 1.5 oz)        General: 58 year old male in NAD.  Answers appropriately.    HEENT: Head is AT/NC. Sclera anicteric. No conjunctival injection.  Oropharynx is clear, moist   Neck: Supple  Lungs: Non labored breathing on room air    Heart: Regular rate   Abdomen: Soft, non-tender, distended. Umbilical hernia present. No rebound or peritoneal signs  Extremities: 1+ pedal edema.  Skin: No jaundice or rash  Neurologic: Grossly non-focal.  CN 2-12 grossly intact. No asterixis            Data:   Labs and imaging below were independently reviewed and interpreted    LAB WORK:    BMP  Recent Labs   Lab 01/29/24  0816 01/29/24  0442 01/29/24  0015 01/28/24 2008 01/28/24  0813 01/28/24  0545 01/28/24  0356 01/27/24  2333 01/24/24  0731 01/24/24  0658   NA  --   --   --   --   --  132*  --  130*  --  133*   POTASSIUM  --   --   --   --   --  4.5  --  4.0  --  4.4   CHLORIDE  --   --   --   --   --  104  --  104  --  100   CONSTANZA  --   --   --   --   --  7.7*  --  7.7*  --  8.6   CO2  --   --   --   --   --  21*  --  18*  --  24   BUN  --   --   --   --   --  11.2  --  11.7  --  19.9   CR  --   --   --   --   --  0.89  --  0.85  --  0.94   * 188* 198* 205*   < > 110*   < > 137*   < > 249*    < > = values in this interval not displayed.     CBC  Recent Labs   Lab 01/28/24  0545 01/27/24  2333 01/24/24  0658   WBC 10.7 10.5 5.4   RBC 3.36* 3.22* 3.57*   HGB 8.8* 8.5* 9.5*   HCT  28.8* 27.5* 30.7*   MCV 86 85 86   MCH 26.2* 26.4* 26.6   MCHC 30.6* 30.9* 30.9*   RDW 18.3* 17.9* 16.7*   PLT 68* 61* 77*     INR  Recent Labs   Lab 01/28/24  0545 01/24/24  0658   INR 1.48* 1.29*     LFTs  Recent Labs   Lab 01/28/24  0545 01/27/24  2333 01/24/24  0658   ALKPHOS 99 95 95   * 374* 42   * 453* 25   BILITOTAL 4.0* 3.6* 1.0   PROTTOTAL 5.6* 5.3* 6.8   ALBUMIN 2.5* 2.4* 3.2*      PANCNo lab results found in last 7 days.  CULTURES:   7-Day Micro Results       Collected Updated Procedure Result Status      01/27/2024 2237 01/28/2024 2311 Urine Culture Aerobic Bacterial [[id number]]   Urine, Midstream    Final result Component Value   Culture No Growth               01/27/2024 1851 01/28/2024 2216 Blood Culture Hand, Right [[id number]]   Blood from Hand, Right    Preliminary result Component Value   Culture No growth after 1 day  [P]                01/27/2024 1851 01/28/2024 2201 Blood Culture Hand, Left [[id number]]    Blood from Hand, Left    Preliminary result Component Value   Culture No growth after 1 day  [P]                      IMAGING:  CT A/P on 1/27/24  IMPRESSION:   1. Status post TIPS.   2.  Extensive nonspecific diminished attenuation throughout the right hepatic lobe. Considerations would include hepatitis, vascular insult, or possibly trauma from the recent TIPS procedure. There is no well-defined abscess or active bleeding site demonstrated.   3.  Cholelithiasis. No evidence of cholecystitis.   4.  Splenomegaly.   5.  Fluid containing umbilical hernia.   6.  Small amount of free intraperitoneal fluid.     US abd TIPS doppler on 1/27/24  FINDINGS: The indwelling TIPS shunt is patent with normal flow velocities.     A small amount of ascites is present. Liver demonstrates heterogeneous echotexture. Splenic vein and portal veins appear patent. Hepatic veins also patent.       CT Chest w/o contrast on 1/27/24  IMPRESSION: Old granulomatous disease.     Trace right pleural  effusion. Minimal dependent atelectasis of the posterior lungs. No acute infiltrate to suggest pneumonia.           =======================================================================

## 2024-01-30 LAB
ALBUMIN SERPL BCG-MCNC: 2.5 G/DL (ref 3.5–5.2)
ALP SERPL-CCNC: 108 U/L (ref 40–150)
ALT SERPL W P-5'-P-CCNC: 237 U/L (ref 0–70)
ANION GAP SERPL CALCULATED.3IONS-SCNC: 8 MMOL/L (ref 7–15)
AST SERPL W P-5'-P-CCNC: 103 U/L (ref 0–45)
BASOPHILS # BLD AUTO: 0 10E3/UL (ref 0–0.2)
BASOPHILS NFR BLD AUTO: 1 %
BILIRUB DIRECT SERPL-MCNC: 1.92 MG/DL (ref 0–0.3)
BILIRUB SERPL-MCNC: 3.2 MG/DL
BUN SERPL-MCNC: 8.8 MG/DL (ref 6–20)
CALCIUM SERPL-MCNC: 8 MG/DL (ref 8.6–10)
CHLORIDE SERPL-SCNC: 102 MMOL/L (ref 98–107)
CREAT SERPL-MCNC: 0.77 MG/DL (ref 0.67–1.17)
DEPRECATED HCO3 PLAS-SCNC: 21 MMOL/L (ref 22–29)
EGFRCR SERPLBLD CKD-EPI 2021: >90 ML/MIN/1.73M2
EOSINOPHIL # BLD AUTO: 0.3 10E3/UL (ref 0–0.7)
EOSINOPHIL NFR BLD AUTO: 4 %
ERYTHROCYTE [DISTWIDTH] IN BLOOD BY AUTOMATED COUNT: 19.4 % (ref 10–15)
GLUCOSE BLDC GLUCOMTR-MCNC: 144 MG/DL (ref 70–99)
GLUCOSE BLDC GLUCOMTR-MCNC: 152 MG/DL (ref 70–99)
GLUCOSE BLDC GLUCOMTR-MCNC: 166 MG/DL (ref 70–99)
GLUCOSE BLDC GLUCOMTR-MCNC: 172 MG/DL (ref 70–99)
GLUCOSE BLDC GLUCOMTR-MCNC: 93 MG/DL (ref 70–99)
GLUCOSE SERPL-MCNC: 158 MG/DL (ref 70–99)
HCT VFR BLD AUTO: 29.8 % (ref 40–53)
HGB BLD-MCNC: 9.2 G/DL (ref 13.3–17.7)
IMM GRANULOCYTES # BLD: 0.1 10E3/UL
IMM GRANULOCYTES NFR BLD: 1 %
LYMPHOCYTES # BLD AUTO: 1.3 10E3/UL (ref 0.8–5.3)
LYMPHOCYTES NFR BLD AUTO: 18 %
MAGNESIUM SERPL-MCNC: 1.5 MG/DL (ref 1.7–2.3)
MAGNESIUM SERPL-MCNC: 2.2 MG/DL (ref 1.7–2.3)
MCH RBC QN AUTO: 26.7 PG (ref 26.5–33)
MCHC RBC AUTO-ENTMCNC: 30.9 G/DL (ref 31.5–36.5)
MCV RBC AUTO: 86 FL (ref 78–100)
MONOCYTES # BLD AUTO: 1 10E3/UL (ref 0–1.3)
MONOCYTES NFR BLD AUTO: 14 %
NEUTROPHILS # BLD AUTO: 4.6 10E3/UL (ref 1.6–8.3)
NEUTROPHILS NFR BLD AUTO: 62 %
NRBC # BLD AUTO: 0 10E3/UL
NRBC BLD AUTO-RTO: 0 /100
PHOSPHATE SERPL-MCNC: 2.6 MG/DL (ref 2.5–4.5)
PLATELET # BLD AUTO: 70 10E3/UL (ref 150–450)
POTASSIUM SERPL-SCNC: 4.3 MMOL/L (ref 3.4–5.3)
PROT SERPL-MCNC: 5.7 G/DL (ref 6.4–8.3)
RBC # BLD AUTO: 3.45 10E6/UL (ref 4.4–5.9)
SODIUM SERPL-SCNC: 131 MMOL/L (ref 135–145)
WBC # BLD AUTO: 7.4 10E3/UL (ref 4–11)

## 2024-01-30 PROCEDURE — 36415 COLL VENOUS BLD VENIPUNCTURE: CPT | Performed by: INTERNAL MEDICINE

## 2024-01-30 PROCEDURE — 250N000011 HC RX IP 250 OP 636: Performed by: PHYSICIAN ASSISTANT

## 2024-01-30 PROCEDURE — 250N000011 HC RX IP 250 OP 636: Performed by: INTERNAL MEDICINE

## 2024-01-30 PROCEDURE — 250N000013 HC RX MED GY IP 250 OP 250 PS 637: Performed by: INTERNAL MEDICINE

## 2024-01-30 PROCEDURE — 82248 BILIRUBIN DIRECT: CPT | Performed by: INTERNAL MEDICINE

## 2024-01-30 PROCEDURE — 250N000011 HC RX IP 250 OP 636: Performed by: STUDENT IN AN ORGANIZED HEALTH CARE EDUCATION/TRAINING PROGRAM

## 2024-01-30 PROCEDURE — 83735 ASSAY OF MAGNESIUM: CPT | Performed by: INTERNAL MEDICINE

## 2024-01-30 PROCEDURE — 99233 SBSQ HOSP IP/OBS HIGH 50: CPT | Performed by: INTERNAL MEDICINE

## 2024-01-30 PROCEDURE — 120N000001 HC R&B MED SURG/OB

## 2024-01-30 PROCEDURE — 80053 COMPREHEN METABOLIC PANEL: CPT | Performed by: INTERNAL MEDICINE

## 2024-01-30 PROCEDURE — 85025 COMPLETE CBC W/AUTO DIFF WBC: CPT | Performed by: INTERNAL MEDICINE

## 2024-01-30 PROCEDURE — 84100 ASSAY OF PHOSPHORUS: CPT | Performed by: INTERNAL MEDICINE

## 2024-01-30 PROCEDURE — 250N000013 HC RX MED GY IP 250 OP 250 PS 637: Performed by: PHYSICIAN ASSISTANT

## 2024-01-30 PROCEDURE — 99232 SBSQ HOSP IP/OBS MODERATE 35: CPT | Performed by: PHYSICIAN ASSISTANT

## 2024-01-30 RX ORDER — LACTULOSE 10 G/15ML
10 SOLUTION ORAL DAILY
Status: DISCONTINUED | OUTPATIENT
Start: 2024-01-30 | End: 2024-01-31 | Stop reason: HOSPADM

## 2024-01-30 RX ORDER — NICOTINE 21 MG/24HR
1 PATCH, TRANSDERMAL 24 HOURS TRANSDERMAL DAILY
Status: DISCONTINUED | OUTPATIENT
Start: 2024-01-30 | End: 2024-01-31 | Stop reason: HOSPADM

## 2024-01-30 RX ORDER — CEFTRIAXONE 1 G/1
1 INJECTION, POWDER, FOR SOLUTION INTRAMUSCULAR; INTRAVENOUS EVERY 24 HOURS
Status: DISCONTINUED | OUTPATIENT
Start: 2024-01-30 | End: 2024-01-31 | Stop reason: HOSPADM

## 2024-01-30 RX ORDER — ONDANSETRON 2 MG/ML
8 INJECTION INTRAMUSCULAR; INTRAVENOUS ONCE
Status: COMPLETED | OUTPATIENT
Start: 2024-01-30 | End: 2024-01-30

## 2024-01-30 RX ORDER — MAGNESIUM SULFATE HEPTAHYDRATE 40 MG/ML
4 INJECTION, SOLUTION INTRAVENOUS ONCE
Status: COMPLETED | OUTPATIENT
Start: 2024-01-30 | End: 2024-01-30

## 2024-01-30 RX ORDER — ONDANSETRON 2 MG/ML
4 INJECTION INTRAMUSCULAR; INTRAVENOUS EVERY 6 HOURS PRN
Status: DISCONTINUED | OUTPATIENT
Start: 2024-01-30 | End: 2024-01-31 | Stop reason: HOSPADM

## 2024-01-30 RX ADMIN — METFORMIN HYDROCHLORIDE 500 MG: 500 TABLET, FILM COATED ORAL at 16:57

## 2024-01-30 RX ADMIN — PANTOPRAZOLE SODIUM 40 MG: 40 TABLET, DELAYED RELEASE ORAL at 06:42

## 2024-01-30 RX ADMIN — CEFTRIAXONE SODIUM 1 G: 1 INJECTION, POWDER, FOR SOLUTION INTRAMUSCULAR; INTRAVENOUS at 13:40

## 2024-01-30 RX ADMIN — METFORMIN HYDROCHLORIDE 500 MG: 500 TABLET, FILM COATED ORAL at 08:54

## 2024-01-30 RX ADMIN — RIFAXIMIN 550 MG: 550 TABLET ORAL at 08:54

## 2024-01-30 RX ADMIN — FUROSEMIDE 20 MG: 20 TABLET ORAL at 08:54

## 2024-01-30 RX ADMIN — NICOTINE 1 PATCH: 21 PATCH, EXTENDED RELEASE TRANSDERMAL at 17:05

## 2024-01-30 RX ADMIN — MAGNESIUM SULFATE IN WATER FOR 4 G: 40 INJECTION INTRAVENOUS at 09:39

## 2024-01-30 RX ADMIN — ONDANSETRON 8 MG: 2 INJECTION INTRAMUSCULAR; INTRAVENOUS at 03:15

## 2024-01-30 RX ADMIN — RIFAXIMIN 550 MG: 550 TABLET ORAL at 20:13

## 2024-01-30 RX ADMIN — LACTULOSE 10 G: 10 SOLUTION ORAL at 13:40

## 2024-01-30 ASSESSMENT — ACTIVITIES OF DAILY LIVING (ADL)
ADLS_ACUITY_SCORE: 20

## 2024-01-30 NOTE — PROVIDER NOTIFICATION
MD Notification    Notified Person: MD hospitalist     Notified Person Name: Dr. Gutiérrez    Notification Date/Time: 1/29/2024 at 0238    Notification Interaction: [employer] web messaging       Purpose of Notification: : 816 D.E. Pt reporting nausea. PRN Lactulose given earlier for stomach discomfort, patient reported feeling like he needed to have a bowel movement and was only able to pass small amounts today. No results from the lactulose. pt wondering if there is something he can have for the nausea?    Orders Received: one time dose IV zofran     Comments:

## 2024-01-30 NOTE — PROGRESS NOTES
"    GASTROENTEROLOGY PROGRESS NOTE    Date of Admission: 1/27/2024  Reason for Admission: altered mental status      ASSESSMENT:  58 year old male with a history of alcohol use disorder, alcohol related decompensated cirrhosis with variceal bleeding (recent banding 12/2023), ascites s/p TIPS 1/24/24, who was admitted to [facility] ICU from [facility] with shock requiring pressors, altered mental status, cough, fever. GI consulted for \"recent TIPS, now with hepatic encephalopathy\".      # Alcohol related decompensated cirrhosis with variceal bleeding (recently banded 12/2023)  # Ascites s/p TIPS 1/24/24  # Elevated LFTs  Patient with recent TIPS on 1/24/24. US abd TIPS doppler 1/26 with patent TIPS, small amount of ascites, other patent vasculature. Not enough ascites to tap so paracentesis not completed. LFTs mostly improving and down trending other than T bili of 3.2 (2.8) PTA on lasix 40mg daily, spironolactone 100mg daily. PTA patient on Nadolol, this was discontinued. Patient with worsening abd distention and pressure today   - Continue lasix 20mg daily. Since there is only trace ascites, will hold other diuretics.   - Will treat empirically for SBP with ceftriaxone 2g for one more day then can discontinue antibiotics from GI perspective (given lack of ascites on imaging)  - Monitor LFTs  - Low Na diet      # Shock  # Fevers    Patient on pressors when admitted to ICU, now off levophed. Respiratory panel negative. CT chest with old granulomatous disease, trace right pleural effusion, minimal dependent atelectasis. CT A/P with extensive nonspecific diminished attenuation throughout right hepatic lobe, cholelithiasis with no evidence of cholecystitis. Started on empiric antibiotics with vancomycin and zosyn (1/26-1/29). Then started on ceftriaxone for SBP ppx. Bcx from [facility] positive with gram positive organism; per hospitalist, lab unable to ID further but likely diptheroid, likely contaminant per ID. MSSA/MRSA negative. Repeat " Bcx 1/27 NGTD. Urine culture NGTD.   - Follow final Bcx   - As above, will empirically treat for SBP with ceftriaxone for one more day      # Encephalopathy  Per patient's wife, he had some confusion prior to admission. No imaging of brain obtained. Ammonia elevated on admission. Encephalopathy possible due to hepatic encephalopathy vs infection. Given dose of lactulose in the ER and started on rifaximin 550mg BID. He continues to have frequent bowel movements. Encephalopathy has resolved and patient with alert and oriented x3.   - Schedule lactulose 10g daily with PRN lactulose available. Titrate to goal 3-4 soft bowel movements daily.    - Continue rifaximin 550mg BID       # GERD   On omeprazole 20mg daily   - PPI daily     Thank you for involving us in this patient's care. Please do not hesitate to contact the GI service with any questions or concerns.     Pt care plan discussed with Dr. Espino, GI staff physician.    Overall time spent on the date of this encounter preparing to see the patient (including chart review of available notes, clinical status events, imaging and labs); obtaining and/or reviewing separately obtained history; ordering medications, tests or procedures; communicating with other health care professionals; and documenting the above clinical information in the electronic medical record was 45 minutes.    Mary Loera PA-C  GI Service  [facility]  Text Page (Monday-Friday, 8am-4pm)    To page the On-Call [facility] GI provider 24 hours a day, please click [facility] and select GASTROENTEROLOGY MEDICINE ADULT / [facility] in the drop-down menu.   _______________________________________________________________      Subjective: Nursing notes and 24hr events reviewed. Patient reports that he feels well. He states yesterday evening he had abdominal pressure and bloating which was relieved after taking lactulose and having multiple bowel movements. He reports at baseline  prior to TIPS, he was having 3 formed stool daily. He has been tolerating oral diet without issues. Afebrile.     ROS:   4 pt ROS negative unless noted in subjective.     Medications:  Current Facility-Administered Medications   Medication    cefTRIAXone (ROCEPHIN) 2 g vial to attach to  ml bag for ADULTS or NS 50 ml bag for PEDS    Daily 2 GRAM acetaminophen limit, unless fulminent liver failure or transaminases greater than or equal to 300 - 400, then none    glucose gel 15-30 g    Or    dextrose 50 % injection 25-50 mL    Or    glucagon injection 1 mg    furosemide (LASIX) tablet 20 mg    insulin aspart (NovoLOG) injection (RAPID ACTING)    insulin aspart (NovoLOG) injection (RAPID ACTING)    lactulose (CHRONULAC) solution 20 g    Or    lactulose (CHRONULAC) solution 20 g    magnesium sulfate 4 g in 100 mL sterile water intermittent infusion    metFORMIN (GLUCOPHAGE) tablet 500 mg    ondansetron (ZOFRAN) injection 4 mg    pantoprazole (PROTONIX) EC tablet 40 mg    rifaximin (XIFAXAN) tablet 550 mg       Objective:  Blood pressure 109/58, pulse 64, temperature 98.3  F (36.8  C), temperature source Oral, resp. rate 16, height 1.829 m (6'), weight 82.6 kg (182 lb), SpO2 96%.  Gen: Sitting in bed. Appears  comfortable  HEENT: NCAT. Conjunctiva clear. Sclera anicteric    CV: Regular rate  Resp: Non-labored breathing  Abd: Soft, non tender, umbilical hernia present, no guarding or rebound   Skin:  No jaundice  Ext: warm, well perfused    Neuro: grossly normal  Mental status/Psych: A&O. Asks/answers questions appropriately     PROCEDURES:  No GI procedures this admission    LABS:  [facility]  Recent Labs   Lab 01/30/24  0659 01/30/24  0327 01/29/24  2109 01/29/24  1709 01/29/24  1159 01/29/24  0934 01/28/24  0813 01/28/24  0545 01/28/24  0356 01/27/24  2333   *  --   --   --   --  129*  --  132*  --  130*   POTASSIUM 4.3  --   --   --   --  4.0  --  4.5  --  4.0   CHLORIDE 102  --   --   --   --  101  --  104  --   104   CONSTANZA 8.0*  --   --   --   --  7.5*  --  7.7*  --  7.7*   CO2 21*  --   --   --   --  19*  --  21*  --  18*   BUN 8.8  --   --   --   --  9.3  --  11.2  --  11.7   CR 0.77  --   --   --   --  0.88  --  0.89  --  0.85   * 152* 141* 161*   < > 245*   < > 110*   < > 137*    < > = values in this interval not displayed.     CBC  Recent Labs   Lab 01/30/24  0659 01/29/24  0934 01/28/24  0545 01/27/24  2333   WBC 7.4 6.7 10.7 10.5   RBC 3.45* 3.13* 3.36* 3.22*   HGB 9.2* 8.4* 8.8* 8.5*   HCT 29.8* 26.9* 28.8* 27.5*   MCV 86 86 86 85   MCH 26.7 26.8 26.2* 26.4*   MCHC 30.9* 31.2* 30.6* 30.9*   RDW 19.4* 19.1* 18.3* 17.9*   PLT 70* 66* 68* 61*     INR  Recent Labs   Lab 01/28/24  0545 01/24/24  0658   INR 1.48* 1.29*     LFTs  Recent Labs   Lab 01/30/24  0659 01/29/24  0934 01/28/24  0545 01/27/24  2333   ALKPHOS 108 108 99 95   * 148* 320* 374*   * 301* 410* 453*   BILITOTAL 3.2* 2.8* 4.0* 3.6*   PROTTOTAL 5.7* 5.5* 5.6* 5.3*   ALBUMIN 2.5* 2.4* 2.5* 2.4*      PANCNo lab results found in last 7 days.  CULTURES:     Culture Blood  Specimen: Blood - Right upper arm structure (body structure)  Component 3 d ago Comments   Blood Culture Growth of Gram Positive Organism Abnormal  Culture sent to [facility] for further testing.   Resulting Agency [facility]    Specimen Collected: 01/26/24  6:35 PM    Performed by: [facility] Last Resulted: 01/30/24  3:36 AM   Received From: [facility]  Result Received: 01/30/24  8:04 AM        7-Day Micro Results       Collected Updated Procedure Result Status      01/29/2024 1150 01/29/2024 1643 MRSA MSSA PCR, Nasal Swab [[id number]]    Swab from Nares, Bilateral    Final result Component Value   MRSA Target DNA Negative   SA Target DNA Negative            01/27/2024 2237 01/28/2024 2311 Urine Culture Aerobic Bacterial [[id number]]   Urine, Midstream    Final result Component Value   Culture No Growth               01/27/2024 1851  01/29/2024 2216 Blood Culture Hand, Right [[id number]]   Blood from Hand, Right    Preliminary result Component Value   Culture No growth after 2 days  [P]                01/27/2024 1851 01/29/2024 2201 Blood Culture Hand, Left [[id number]]    Blood from Hand, Left    Preliminary result Component Value   Culture No growth after 2 days  [P]                        IMAGING:  US abd limited on 1/29/24  IMPRESSION:  Small volume abdominal ascites, not enough to perform paracentesis  safely.

## 2024-01-30 NOTE — PROVIDER NOTIFICATION
MD Notification    Notified Person: MD hospitalist     Notified Person Name: Dr. Gutiérrez     Notification Date/Time: 1/30/2024 at 0633    Notification Interaction: [employer] web messaging     Purpose of Notification: [address]16 [address] Phone call to unit from [place] pharmacist reporting blood cultures that were drawn before his transfer here showed gram + organism.     Orders Received: MD aware.     Comments:

## 2024-01-30 NOTE — PLAN OF CARE
[id number]    Orientation: A&Ox4  Activity: Independent   Diet/BS Checks: 2 gram Na w/ BG checks.  Tele:  none  IV Access/Drains: R PIV SL w/ intermittent IV antibiotics  Pain Management: reported abdominal discomfort and fullness, see MD notes.   Abnormal VS/Results: VSS on RA ex soft BP  Bowel/Bladder: Continent B/B. Reported multiple BMs this shift.   Skin/Wounds: Intact, hernia to abdomen. Pt reports scrotal edema improved.  Consults: GI  D/C Disposition: Pending     Other Info:   A&Ox4. VSS on RA except soft Bps. Reported abdominal discomfort, MD notified, PRN lactulose given x1 with mild results, MD notified again for increased nausea, IV zofran given x1 with moderate results. Up independently in room. Continent B/B. multiple BMs this morning per pt report. R PIV SL with intermittent IV antibiotics. 2 gram NA diet with BG checks. Discharge pending.

## 2024-01-30 NOTE — PLAN OF CARE
0700-1930:  Orientation: A/Ox4  Activity: Ind  Diet/BS Checks: 2grm Na w/ BG checks w/ meals  Tele:  Discontinued  IV Access/Drains: PIV SL w/ int abx  Pain Management: Denies  Abnormal VS/Results: VSS on RA ex soft BP  Bowel/Bladder: Continent, 2-3 BM this shift  Skin/Wounds: Intact, hernia to abd and +2 scrotal edema  Consults: GI  D/C Disposition: Pending workup  Other Info: Unable to obtain paracentesis this shift d/t insufficient amount of fluid.

## 2024-01-30 NOTE — PROGRESS NOTES
[facility]    Medicine Progress Note - Hospitalist Service    Date of Admission:  1/27/2024    Assessment & Plan   58-year-old male with past medical history of cirrhosis s/p TIPS procedure on 1/24 presented with fever and cough along with altered mental status.     Cirrhosis with portal hypertension  Decompensated cirrhosis s/p TIPS on 1/24/2024  Possible hepatic encephalopathy  History of esophageal varices with bleeding, recently banded December 2023  Acute transaminitis and hyperbilirubinemia likely from recently TIPS start patient on low-dose   Patient initially admitted to the ICU due to hypotension needing pressors due to concern for altered mental status/encephalopathy with low-grade fever of 100.8 for 3 days  -Infectious workup was done with a chest x-ray which was negative, respiratory panel which was negative for COVID and flu, with no clear signs of ascites, cultures were drawn patient was started on IV antibiotics.    -Initial ammonia level 80 on initial presentation to [facility] (1/26) then down to 40   CT chest done 1/26: showing Old granulomatous disease. Trace right pleural effusion. Minimal dependent atelectasis of the posterior lungs. No acute infiltrate to suggest pneumonia.   -Patient was started on vancomycin and Zosyn in the ICU, transitioned  to ceftriaxone, continue.  -Positive blood culture from [facility] as below, however blood culture drawn at \[facility]\"" negative to date  -PTA on nadolol, Lasix 40 mg twice daily and spironolactone 100 mg daily  -U molly [facility] GI following, appreciate input.  -US abdomen TIPS Doppler 1/26 with patent TIPS  -Attempted diagnostic and therapeutic paracentesis, however ultrasound only noted trace ascites.  GI recommending to continue Lasix 20 mg daily and discontinue PTA nadolol and spironolactone.  -Continue rifaximin 550 mg twice daily  -Lactulose scheduled to 10 g daily with as needed lactulose available, titrate to goal of 3-4  soft bowel movement daily    Positive blood culture  Possible septic shock  -Patient had presented with hypotension needing pressors and encephalopathy with low-grade fever as above.  -1 out of 2 blood culture positive for GPC that was drawn at [place] emergency room.  I called [facility] lab for rapid test and was later called that rapid test could not detect any organism with suggest it is likely diphtheroids.  MRSA/MSSA swab negative.  -Blood culture done at [facility] has been negative so far.  Patient has been afebrile after initial temperature of 100.9 in the ICU.  WBC count has been normal.  -Discussed with infectious disease Dr. Maier, if it is diphtheroid, likely is a contaminant and does not need treatment.  Patient has already received IV antibiotics for last several days and 1-2 more day of ceftriaxone should suffice.  -Await further culture data/sensitivity, continue ceftriaxone.    Anemia  Thrombocytopenia  -Appears chronic with baseline hemoglobin around 8 and platelet 60s-70s, hemoglobin around his baseline and patient denies any active bleed  -Monitor intermittently    Type 2 diabetes mellitus   -Hemoglobin A1c 8.8  -Continue PTA on metformin  -SSI with medium sliding scale while in the hospital  -Blood sugar mostly less than 200 over the last 24 hours    Hyponatremia, mild  Monitor        Diet: 2 Gram Sodium Diet    DVT Prophylaxis: Pneumatic Compression Devices  Berger Catheter: Not present  Lines: None     Cardiac Monitoring: None  Code Status: Full Code      Clinically Significant Risk Factors         # Hyponatremia: Lowest Na = 129 mmol/L in last 2 days, will monitor as appropriate    # Hypomagnesemia: Lowest Mg = 1.5 mg/dL in last 2 days, will replace as needed   # Hypoalbuminemia: Lowest albumin = 2.4 g/dL at 1/29/2024  9:34 AM, will monitor as appropriate    # Coagulation Defect: INR = 1.48 (Ref range: 0.85 - 1.15) and/or PTT = 40 Seconds (Ref range: 22 - 38 Seconds), will  monitor for bleeding  # Thrombocytopenia: Lowest platelets = 66 in last 2 days, will monitor for bleeding         # DMII: A1C = 8.8 % (Ref range: <5.7 %) within past 6 months, PRESENT ON ADMISSION             Disposition Plan      Expected Discharge Date: 01/31/2024                    Elvira Penny MD  Hospitalist Service  [facility]  Securely message with [email or web address] (more info)  Text page via [facility] Paging/Directory   ______________________________________________________________________    Interval History   Patient reports feeling fine, he was hoping to go home.  Discussed with the patient about positive blood culture.  I called [facility] lab to do rapid test on positive blood culture, later I was notified that organism could not be detected but likely is diphtheroid.  Also discussed with infectious disease about antibiotic recommendation.  Recommended to continue ceftriaxone for a day or 2.  Also discussed with Silver WALTERS about the same.  GI agrees plan to continue ceftriaxone for another day and likely home tomorrow.  Given minimal ascites patient will not need SBP prophylaxis at the time of discharge.    Physical Exam   Vital Signs: Temp: 98.6  F (37  C) Temp src: Oral BP: 114/64 Pulse: 72   Resp: 18 SpO2: 96 % O2 Device: None (Room air)    Weight: 182 lbs 0 oz    Exam:  Constitutional: Awake, alert and no distress. Appears comfortable  Head: Normocephalic. No masses, lesions, tenderness or abnormalities  ENMT: ENT exam normal, no neck nodes or sinus tenderness  Cardiovascular: RRR.  no murmurs, no rubs or JVD  Respiratory:normal WOB,b/l equal air entry, no wheezes or crackles   Gastrointestinal: Abdomen soft, non-tender. BS normal. No masses, organomegaly  : Deferred  Extremities : No edema , no clubbing or cyanosis      Medical Decision Making       55 MINUTES SPENT BY ME on the date of service doing chart review, history, exam, documentation & further activities per the note.       Data     I have personally reviewed the following data over the past 24 hrs:    7.4  \   9.2 (L)   / 70 (L)     131 (L) 102 8.8 /  158 (H)   4.3 21 (L) 0.77 \     ALT: 237 (H) AST: 103 (H) AP: 108 TBILI: 3.2 (H)   ALB: 2.5 (L) TOT PROTEIN: 5.7 (L) LIPASE: N/A       Imaging results reviewed over the past 24 hrs:   Recent Results (from the past 24 hour(s))   US Abdomen Limited    Narrative    US ABDOMEN LIMITED 1/29/2024 3:55 PM    CLINICAL HISTORY: Patient with cirrhosis with recent TIPS, increased  abd distention/pressure  TECHNIQUE: Limited abdominal ultrasound.    COMPARISON: Outside CT 1/26/2024.    FINDINGS:    Small volume abdominal ascites.      Impression    IMPRESSION:  Small volume abdominal ascites, not enough to perform paracentesis  safely.    TANIA KEN MD         SYSTEM ID:  [id number]     Recent Labs   Lab 01/30/24  0659 01/30/24  0327 01/29/24  2109 01/29/24  1159 01/29/24  0934 01/28/24  0813 01/28/24  0545 01/24/24  0731 01/24/24  0658   WBC 7.4  --   --   --  6.7  --  10.7   < > 5.4   HGB 9.2*  --   --   --  8.4*  --  8.8*   < > 9.5*   MCV 86  --   --   --  86  --  86   < > 86   PLT 70*  --   --   --  66*  --  68*   < > 77*   INR  --   --   --   --   --   --  1.48*  --  1.29*   *  --   --   --  129*  --  132*   < > 133*   POTASSIUM 4.3  --   --   --  4.0  --  4.5   < > 4.4   CHLORIDE 102  --   --   --  101  --  104   < > 100   CO2 21*  --   --   --  19*  --  21*   < > 24   BUN 8.8  --   --   --  9.3  --  11.2   < > 19.9   CR 0.77  --   --   --  0.88  --  0.89   < > 0.94   ANIONGAP 8  --   --   --  9  --  7   < > 9   CONSTANZA 8.0*  --   --   --  7.5*  --  7.7*   < > 8.6   * 152* 141*   < > 245*   < > 110*   < > 249*   ALBUMIN 2.5*  --   --   --  2.4*  --  2.5*   < > 3.2*   PROTTOTAL 5.7*  --   --   --  5.5*  --  5.6*   < > 6.8   BILITOTAL 3.2*  --   --   --  2.8*  --  4.0*   < > 1.0   ALKPHOS 108  --   --   --  108  --  99   < > 95   *  --   --   --  301*  --  410*   < > 25    *  --   --   --  148*  --  320*   < > 42    < > = values in this interval not displayed.

## 2024-01-30 NOTE — PROVIDER NOTIFICATION
MD Notification    Notified Person: MD hospitalist     Notified Person Name: Dr. Antoine     Notification Date/Time: 1/29/2024 at 2149    Notification Interaction: [employer] web messsaging     Purpose of Notification: [address]  Pt reporting abdominal discomfort. he states he feels like it is because he needs to have a bowel movement, pt reports only being able to pass small amounts today. Pt does not want to take lactulose.     Respoke with patient, he stated that he feel that need to have a BM is causing some nausea and is now open to taking lactulose tonight.     Give the PRN 20 G dose that is currently available?     Orders Received: yes take the lactulose.     Comments:

## 2024-01-31 VITALS
SYSTOLIC BLOOD PRESSURE: 99 MMHG | OXYGEN SATURATION: 96 % | HEIGHT: 72 IN | DIASTOLIC BLOOD PRESSURE: 50 MMHG | HEART RATE: 73 BPM | WEIGHT: 178.9 LBS | TEMPERATURE: 97.3 F | BODY MASS INDEX: 24.23 KG/M2 | RESPIRATION RATE: 16 BRPM

## 2024-01-31 LAB
ALBUMIN SERPL BCG-MCNC: 2.5 G/DL (ref 3.5–5.2)
ALP SERPL-CCNC: 104 U/L (ref 40–150)
ALT SERPL W P-5'-P-CCNC: 182 U/L (ref 0–70)
ANION GAP SERPL CALCULATED.3IONS-SCNC: 5 MMOL/L (ref 7–15)
AST SERPL W P-5'-P-CCNC: 73 U/L (ref 0–45)
BASOPHILS # BLD AUTO: 0 10E3/UL (ref 0–0.2)
BASOPHILS NFR BLD AUTO: 0 %
BILIRUB DIRECT SERPL-MCNC: 1.46 MG/DL (ref 0–0.3)
BILIRUB SERPL-MCNC: 2.7 MG/DL
BUN SERPL-MCNC: 8.2 MG/DL (ref 6–20)
CALCIUM SERPL-MCNC: 8 MG/DL (ref 8.6–10)
CHLORIDE SERPL-SCNC: 101 MMOL/L (ref 98–107)
CREAT SERPL-MCNC: 0.78 MG/DL (ref 0.67–1.17)
DEPRECATED HCO3 PLAS-SCNC: 24 MMOL/L (ref 22–29)
EGFRCR SERPLBLD CKD-EPI 2021: >90 ML/MIN/1.73M2
EOSINOPHIL # BLD AUTO: 0.2 10E3/UL (ref 0–0.7)
EOSINOPHIL NFR BLD AUTO: 4 %
ERYTHROCYTE [DISTWIDTH] IN BLOOD BY AUTOMATED COUNT: 19.9 % (ref 10–15)
GLUCOSE BLDC GLUCOMTR-MCNC: 109 MG/DL (ref 70–99)
GLUCOSE BLDC GLUCOMTR-MCNC: 162 MG/DL (ref 70–99)
GLUCOSE SERPL-MCNC: 137 MG/DL (ref 70–99)
HCT VFR BLD AUTO: 29.1 % (ref 40–53)
HGB BLD-MCNC: 8.9 G/DL (ref 13.3–17.7)
IMM GRANULOCYTES # BLD: 0.1 10E3/UL
IMM GRANULOCYTES NFR BLD: 1 %
LYMPHOCYTES # BLD AUTO: 1.3 10E3/UL (ref 0.8–5.3)
LYMPHOCYTES NFR BLD AUTO: 21 %
MAGNESIUM SERPL-MCNC: 1.7 MG/DL (ref 1.7–2.3)
MCH RBC QN AUTO: 26.3 PG (ref 26.5–33)
MCHC RBC AUTO-ENTMCNC: 30.6 G/DL (ref 31.5–36.5)
MCV RBC AUTO: 86 FL (ref 78–100)
MONOCYTES # BLD AUTO: 0.8 10E3/UL (ref 0–1.3)
MONOCYTES NFR BLD AUTO: 13 %
NEUTROPHILS # BLD AUTO: 3.7 10E3/UL (ref 1.6–8.3)
NEUTROPHILS NFR BLD AUTO: 61 %
NRBC # BLD AUTO: 0 10E3/UL
NRBC BLD AUTO-RTO: 0 /100
PHOSPHATE SERPL-MCNC: 2.7 MG/DL (ref 2.5–4.5)
PLATELET # BLD AUTO: 65 10E3/UL (ref 150–450)
POTASSIUM SERPL-SCNC: 4.1 MMOL/L (ref 3.4–5.3)
PROT SERPL-MCNC: 5.7 G/DL (ref 6.4–8.3)
RBC # BLD AUTO: 3.38 10E6/UL (ref 4.4–5.9)
SODIUM SERPL-SCNC: 130 MMOL/L (ref 135–145)
WBC # BLD AUTO: 6.1 10E3/UL (ref 4–11)

## 2024-01-31 PROCEDURE — 99239 HOSP IP/OBS DSCHRG MGMT >30: CPT | Performed by: INTERNAL MEDICINE

## 2024-01-31 PROCEDURE — 250N000013 HC RX MED GY IP 250 OP 250 PS 637: Performed by: INTERNAL MEDICINE

## 2024-01-31 PROCEDURE — 80048 BASIC METABOLIC PNL TOTAL CA: CPT | Performed by: INTERNAL MEDICINE

## 2024-01-31 PROCEDURE — 36415 COLL VENOUS BLD VENIPUNCTURE: CPT | Performed by: INTERNAL MEDICINE

## 2024-01-31 PROCEDURE — 84100 ASSAY OF PHOSPHORUS: CPT | Performed by: INTERNAL MEDICINE

## 2024-01-31 PROCEDURE — 85025 COMPLETE CBC W/AUTO DIFF WBC: CPT | Performed by: INTERNAL MEDICINE

## 2024-01-31 PROCEDURE — 80053 COMPREHEN METABOLIC PANEL: CPT | Performed by: INTERNAL MEDICINE

## 2024-01-31 PROCEDURE — 250N000011 HC RX IP 250 OP 636: Performed by: PHYSICIAN ASSISTANT

## 2024-01-31 PROCEDURE — 99232 SBSQ HOSP IP/OBS MODERATE 35: CPT | Performed by: PHYSICIAN ASSISTANT

## 2024-01-31 PROCEDURE — 83735 ASSAY OF MAGNESIUM: CPT | Performed by: INTERNAL MEDICINE

## 2024-01-31 PROCEDURE — 250N000013 HC RX MED GY IP 250 OP 250 PS 637: Performed by: PHYSICIAN ASSISTANT

## 2024-01-31 PROCEDURE — 82248 BILIRUBIN DIRECT: CPT | Performed by: INTERNAL MEDICINE

## 2024-01-31 RX ORDER — FUROSEMIDE 20 MG
20 TABLET ORAL EVERY MORNING
COMMUNITY
Start: 2024-01-31 | End: 2024-03-04

## 2024-01-31 RX ORDER — LACTULOSE 10 G/15ML
10 SOLUTION ORAL DAILY
Qty: 947 ML | Refills: 0 | Status: SHIPPED | OUTPATIENT
Start: 2024-01-31 | End: 2024-03-01

## 2024-01-31 RX ADMIN — PANTOPRAZOLE SODIUM 40 MG: 40 TABLET, DELAYED RELEASE ORAL at 06:33

## 2024-01-31 RX ADMIN — METFORMIN HYDROCHLORIDE 500 MG: 500 TABLET, FILM COATED ORAL at 09:34

## 2024-01-31 RX ADMIN — CEFTRIAXONE SODIUM 1 G: 1 INJECTION, POWDER, FOR SOLUTION INTRAMUSCULAR; INTRAVENOUS at 09:33

## 2024-01-31 RX ADMIN — RIFAXIMIN 550 MG: 550 TABLET ORAL at 09:34

## 2024-01-31 RX ADMIN — LACTULOSE 10 G: 10 SOLUTION ORAL at 09:33

## 2024-01-31 ASSESSMENT — ACTIVITIES OF DAILY LIVING (ADL)
ADLS_ACUITY_SCORE: 20

## 2024-01-31 NOTE — PROGRESS NOTES
"    GASTROENTEROLOGY PROGRESS NOTE    Date of Admission: 1/27/2024  Reason for Admission: fevers      ASSESSMENT:  58 year old male with a history of alcohol use disorder, alcohol related decompensated cirrhosis with variceal bleeding (recent banding 12/2023), ascites s/p TIPS 1/24/24, who was admitted to [facility] ICU from [facility] with shock requiring pressors, altered mental status, cough, fever. GI consulted for \"recent TIPS, now with hepatic encephalopathy\".     # Alcohol related decompensated cirrhosis with variceal bleeding (recently banded 12/2023)  # Ascites s/p TIPS 1/24/24  # Elevated LFTs, improving  Patient with recent TIPS on 1/24/24. US abd TIPS doppler 1/26 with patent TIPS, small amount of ascites, other patent vasculature. Not enough ascites to tap so paracentesis not completed. LFTs elevated likely due to recent TIPS, overall improving and down trending. PTA on lasix 40mg daily, spironolactone 100mg daily. PTA patient on Nadolol, this was discontinued. Treated for SBP ppx with ceftriaxone, however given only trace ascites, plan to stop as SBP is not likely. Similarly, given only trace ascites, will continue lasix 20mg daily only and stop other diuretics.    - Continue lasix 20mg daily.    - No further antibiotics from GI perspective   - Low Na diet   - Next hepatology appt: April 15, 2024     # Shock  # Fevers    # Blood cultures positive for gram positive organisms, likely contaminant per ID  Patient on pressors when admitted to ICU, off levophed shortly after arrival. Respiratory panel negative. CT chest with old granulomatous disease, trace right pleural effusion, minimal dependent atelectasis. CT A/P with extensive nonspecific diminished attenuation throughout right hepatic lobe, cholelithiasis with no evidence of cholecystitis. Started on empiric antibiotics with vancomycin and zosyn (1/26-1/29). Then started on ceftriaxone for SBP ppx (1/29-1/31). Bcx from [facility] positive with gram positive organism; per " hospitalist, lab unable to ID further but likely diptheroid, likely contaminant per curbside ID. MSSA/MRSA negative. Repeat Bcx 1/27 NGTD. Urine culture NGTD.   - Follow final Bcx      # Encephalopathy  Per patient's wife, he had some confusion prior to admission. No imaging of brain obtained. Ammonia elevated on admission. Encephalopathy possible due to hepatic encephalopathy vs infection. Given dose of lactulose in the ER and started on rifaximin 550mg BID. He continues to have frequent bowel movements. Encephalopathy has resolved and patient is alert and oriented x3.   - Schedule lactulose 10g daily with PRN lactulose available. Titrate to goal 3-4 soft bowel movements daily.    - Continue rifaximin 550mg BID       # GERD   On omeprazole 20mg daily   - PPI daily        Thank you for involving us in this patient's care. Please do not hesitate to contact the GI service with any questions or concerns.     Overall time spent on the date of this encounter preparing to see the patient (including chart review of available notes, clinical status events, imaging and labs); obtaining and/or reviewing separately obtained history; ordering medications, tests or procedures; communicating with other health care professionals; and documenting the above clinical information in the electronic medical record was 35 minutes.    Mary Loera PA-C  GI Service  [facility]  Text Page (Monday-Friday, 8am-4pm)    To page the On-Call [facility] GI provider 24 hours a day, please click [facility] and select GASTROENTEROLOGY MEDICINE ADULT / [facility] in the drop-down menu.   _______________________________________________________________      Subjective: Nursing notes and 24hr events reviewed. Patient reports that he feels great.He denies any further abdominal pain, bloating, or nausea. He has been having 3-4 bowel movements daily, no black or bloody stools. Has remained afebrile.     ROS:   4 pt ROS negative unless  noted in subjective.     Medications:  Current Facility-Administered Medications   Medication    cefTRIAXone (ROCEPHIN) 1 g vial to attach to  mL bag for ADULTS or NS 50 mL bag for PEDS    Daily 2 GRAM acetaminophen limit, unless fulminent liver failure or transaminases greater than or equal to 300 - 400, then none    glucose gel 15-30 g    Or    dextrose 50 % injection 25-50 mL    Or    glucagon injection 1 mg    furosemide (LASIX) tablet 20 mg    insulin aspart (NovoLOG) injection (RAPID ACTING)    insulin aspart (NovoLOG) injection (RAPID ACTING)    lactulose (CHRONULAC) solution 10 g    lactulose (CHRONULAC) solution 20 g    Or    lactulose (CHRONULAC) solution 20 g    metFORMIN (GLUCOPHAGE) tablet 500 mg    nicotine (NICODERM CQ) 21 MG/24HR 24 hr patch 1 patch    nicotine Patch in Place    ondansetron (ZOFRAN) injection 4 mg    pantoprazole (PROTONIX) EC tablet 40 mg    rifaximin (XIFAXAN) tablet 550 mg       Objective:  Blood pressure 99/50, pulse 73, temperature 97.3  F (36.3  C), temperature source Oral, resp. rate 16, height 1.829 m (6'), weight 81.1 kg (178 lb 14.4 oz), SpO2 96%.  Gen: Sitting in bed. Appears comfortable  HEENT: NCAT. Conjunctiva clear. Sclera anicteric    CV: Regular rate  Resp: Non-labored breathing  Abd: Soft, non tender, no guarding or rebound. Umbilical hernia present  Skin: No jaundice  Ext: warm, well perfused    Neuro: grossly normal  Mental status/Psych: A&O. Asks/answers questions appropriately     PROCEDURES:  No GI procedures this admission.     LABS:  [facility]  Recent Labs   Lab 01/31/24  0736 01/31/24  0539 01/31/24  0239 01/30/24  2150 01/30/24  1230 01/30/24  0659 01/29/24  1159 01/29/24  0934 01/28/24  0813 01/28/24  0545   *  --   --   --   --  131*  --  129*  --  132*   POTASSIUM 4.1  --   --   --   --  4.3  --  4.0  --  4.5   CHLORIDE 101  --   --   --   --  102  --  101  --  104   CONSTANZA 8.0*  --   --   --   --  8.0*  --  7.5*  --  7.7*   CO2 24  --   --   --   --   21*  --  19*  --  21*   BUN 8.2  --   --   --   --  8.8  --  9.3  --  11.2   CR 0.78  --   --   --   --  0.77  --  0.88  --  0.89   * 162* 109* 93   < > 158*   < > 245*   < > 110*    < > = values in this interval not displayed.     CBC  Recent Labs   Lab 01/31/24  0736 01/30/24  0659 01/29/24  0934 01/28/24  0545   WBC 6.1 7.4 6.7 10.7   RBC 3.38* 3.45* 3.13* 3.36*   HGB 8.9* 9.2* 8.4* 8.8*   HCT 29.1* 29.8* 26.9* 28.8*   MCV 86 86 86 86   MCH 26.3* 26.7 26.8 26.2*   MCHC 30.6* 30.9* 31.2* 30.6*   RDW 19.9* 19.4* 19.1* 18.3*   PLT 65* 70* 66* 68*     INR  Recent Labs   Lab 01/28/24  0545   INR 1.48*     LFTs  Recent Labs   Lab 01/31/24  0736 01/30/24  0659 01/29/24  0934 01/28/24  0545   ALKPHOS 104 108 108 99   AST 73* 103* 148* 320*   * 237* 301* 410*   BILITOTAL 2.7* 3.2* 2.8* 4.0*   PROTTOTAL 5.7* 5.7* 5.5* 5.6*   ALBUMIN 2.5* 2.5* 2.4* 2.5*      PANCNo lab results found in last 7 days.  CULTURES:     Culture Blood  Specimen: Blood - Right upper arm structure (body structure)  Component 4 d ago Comments   Blood Culture Growth of Gram Positive Organism Abnormal  Culture sent to [facility] for further testing.   Resulting Agency [facility]    Specimen Collected: 01/26/24  6:35 PM    Performed by: [facility] Last Resulted: 01/30/24  3:36 AM   Received From: [facility]  Result Received: 01/31/24  7:46 AM     7-Day Micro Results       Collected Updated Procedure Result Status      01/29/2024 1150 01/29/2024 1643 MRSA MSSA PCR, Nasal Swab [[id number]]    Swab from Nares, Bilateral    Final result Component Value   MRSA Target DNA Negative   SA Target DNA Negative            01/27/2024 2237 01/28/2024 2311 Urine Culture Aerobic Bacterial [[id number]]   Urine, Midstream    Final result Component Value   Culture No Growth               01/27/2024 1851 01/30/2024 2216 Blood Culture Hand, Right [[id number]]   Blood from Hand, Right    Preliminary result Component Value    Culture No growth after 3 days  [P]                01/27/2024 1851 01/30/2024 2201 Blood Culture Hand, Left [[id number]]    Blood from Hand, Left    Preliminary result Component Value   Culture No growth after 3 days  [P]                      IMAGING:  Reviewed.

## 2024-01-31 NOTE — PLAN OF CARE
0700-1930:    Orientation: A/ox4, frustrated  Activity: ind  Diet/BS Checks: 2grm Na diet w/ BG check w/ meals  Tele:  N/A  IV Access/Drains: PIV SL  Pain Management: denies  Abnormal VS/Results: VSS on RA. Mag 1.5, replaced on days, recheck 2.2, recheck in AM  Bowel/Bladder: Continent. Started on scheduled lactulose, having 3-4 BMs  Skin/Wounds: abd hernia  Consults: GI  D/C Disposition: Plan for possible discharge tomorrow pending final BC results  Other Info: Pt frustrated due to staying another day.

## 2024-01-31 NOTE — PLAN OF CARE
[id number]     Orientation: A&Ox4  Activity: Independent   Diet/BS Checks: 2 gram Na w/ BG checks.  Tele:  none  IV Access/Drains: R PIV SL w/ intermittent IV antibiotics  Pain Management: denied pain   Abnormal VS/Results: VSS on RA ex soft BPs  Bowel/Bladder: Continent B/B. Reported multiple BMs this shift  Skin/Wounds: Intact, hernia to abdomen. Pt reports scrotal edema improved.  Consults: GI  D/C Disposition: Pending      Other Info:   A&Ox4. VSS on RA except soft Bps. Denied pain. Up independently in room. Continent B/B. multiple Bms per pt report. R PIV SL with intermittent IV antibiotics. 2 gram NA diet with BG checks. Nicotine patch in place on L arm. Discharge pending.

## 2024-01-31 NOTE — DISCHARGE SUMMARY
Discharge Note    Patient discharged to home via private vehicle  accompanied by significant other .  IV: Discontinued  Prescriptions e-prescribed to pharmacy.   Belongings reviewed and sent with patient.   Home medications returned to patient: NA  Equipment sent with: patient, N/A.   patient verbalizes understanding of discharge instructions. AVS given to patient.      Pt A/O, VSS on RA, soft BP, AM lasix held. Denies pain. Having bowel movements. Up ind.

## 2024-01-31 NOTE — DISCHARGE SUMMARY
[facility]  Hospitalist Discharge Summary      Date of Admission:  1/27/2024  Date of Discharge:  1/31/2024  Discharging Provider: Elvira Penny MD  Discharge Service: Hospitalist Service    Discharge Diagnoses   Cirrhosis with portal hypertension  Septic shock  secondary to TIPS procedure , resolved prior to discharge  Hepatic encephalopathy, resolved prior to discharge  History of esophageal varices  Acute transaminitis and hyperbilirubinemia,post TIPS, improving  Positive blood culture with diphtheroids, likely contaminant  Anemia, chronic  Thrombocytopenia, chronic  Type 2 diabetes mellitus  Hyponatremia, mild    Clinically Significant Risk Factors     # DMII: A1C = 8.8 % (Ref range: <5.7 %) within past 6 months       Follow-ups Needed After Discharge   Follow-up Appointments     Follow-up and recommended labs and tests       Follow up with primary care provider, Chuck Shafer, within 7 days for   hospital follow- up.  The following labs/tests are recommended: CBC/CMP.    Follow-up with GI next available          Unresulted Labs Ordered in the Past 30 Days of this Admission       Date and Time Order Name Status Description    1/27/2024  6:35 PM Blood Culture Hand, Left Preliminary     1/27/2024  6:35 PM Blood Culture Hand, Right Preliminary           Discharge Disposition   Discharged to home  Condition at discharge: Stable    Hospital Course   58-year-old male with past medical history of cirrhosis s/p TIPS procedure on 1/24 presented with fever and cough along with altered mental status.     Cirrhosis with portal hypertension  Decompensated cirrhosis s/p TIPS on 1/24/2024  Possible hepatic encephalopathy  History of esophageal varices with bleeding, recently banded December 2023  Acute transaminitis and hyperbilirubinemia likely from recently TIPS start patient on low-dose   Patient initially admitted to the ICU due to hypotension needing pressors due to concern for altered mental  status/encephalopathy with low-grade fever of 100.8 for 3 days  -Infectious workup was done with a chest x-ray which was negative, respiratory panel which was negative for COVID and flu, with no clear signs of ascites, cultures were drawn patient was started on IV antibiotics.    -Initial ammonia level 80 on initial presentation to [facility] (1/26) then down to 40   CT chest done 1/26: showing Old granulomatous disease. Trace right pleural effusion. Minimal dependent atelectasis of the posterior lungs. No acute infiltrate to suggest pneumonia.   -Patient was started on vancomycin and Zosyn in the ICU, transitioned  to ceftriaxone, continue.  -Positive blood culture from [facility] as below, however blood culture drawn at [facility] negative to date  -PTA on nadolol, Lasix 40 mg twice daily and spironolactone 100 mg daily  -[facility] followed while in-house, appreciate input.  -US abdomen TIPS Doppler 1/26 with patent TIPS  -Attempted diagnostic and therapeutic paracentesis, however ultrasound only noted trace ascites.  GI recommending to continue Lasix 20 mg daily and discontinue PTA nadolol and spironolactone.  -Continue rifaximin 550 mg twice daily  -Lactulose scheduled to 10 g daily with as needed lactulose available, titrate to goal of 3-4 soft bowel movement daily    Positive blood culture with diphtheroids, likely contaminant  Possible septic shock secondary to tips procedure , resolved prior to discharge   -Patient had presented with hypotension needing pressors and encephalopathy with low-grade fever as above.  -1 out of 2 blood culture positive for GPC, final identification was diphtheroids, likely contaminant  -Blood culture done at [facility] has been negative so far.  Patient has been afebrile after initial temperature of 100.9 in the ICU.  WBC count has been normal.  -Discussed with infectious disease Dr. Maier,  diphtheroid is a contaminant and does not need treatment.  Patient has  already received IV antibiotics for last several days, and stable at the time of discharge    Anemia  Thrombocytopenia  -Appears chronic with baseline hemoglobin around 8 and platelet 60s-70s, hemoglobin around his baseline and patient denies any active bleed  -Monitor intermittently    Type 2 diabetes mellitus   -Hemoglobin A1c 8.8  -Continue PTA on metformin  -SSI with medium sliding scale while in the hospital  -Blood sugar mostly less than 200 over the last 24 hours    Hyponatremia, mild  Monitor    Consultations This Hospital Stay   PHARMACY TO DOSE VANCO  GI LUMINAL ADULT IP CONSULT    Code Status   Full Code    Time Spent on this Encounter   I, Elvira Penny MD, personally saw the patient today and spent greater than 30 minutes discharging this patient.       Elvira Penny MD  [place] [address] [facility]  [address] [place] [address], [address]  [facility] [address]  Phone: [phone]  ______________________________________________________________________    Physical Exam   Vital Signs: Temp: 97.3  F (36.3  C) Temp src: Oral BP: 99/50 Pulse: 73   Resp: 16 SpO2: 96 % O2 Device: None (Room air)    Weight: 178 lbs 14.4 oz  Exam:  Constitutional: Awake, alert and no distress. Appears comfortable  Head: Normocephalic. No masses, lesions, tenderness or abnormalities  ENMT: ENT exam normal, no neck nodes or sinus tenderness  Cardiovascular: RRR.  no murmurs, no rubs or JVD  Respiratory:normal WOB,b/l equal air entry, no wheezes or crackles   Gastrointestinal: Abdomen soft, non-tender. BS normal. No masses, organomegaly  : Deferred  Extremities :no edema , no clubbing or cyanosis         Primary Care Physician   Chuck Shafer    Discharge Orders      Reason for your hospital stay    Septic shock , hepatic encephalopathy     Follow-up and recommended labs and tests     Follow up with primary care provider, Chuck Shafer, within 7 days for hospital follow- up.  The following labs/tests are recommended:  CBC/CMP.    Follow-up with GI next available     Activity    Your activity upon discharge: activity as tolerated     Monitor and record    blood glucose 2 times a day, before meals and at bedtime and readings to PCP for medication adjustment   blood pressure daily and readings to PCP     Diet    Follow this diet upon discharge: Orders Placed This Encounter      2 Gram Sodium Diet       Significant Results and Procedures   Results for orders placed or performed during the hospital encounter of 01/27/24   US Abdomen Limited    Narrative    US ABDOMEN LIMITED 1/29/2024 3:55 PM    CLINICAL HISTORY: Patient with cirrhosis with recent TIPS, increased  abd distention/pressure  TECHNIQUE: Limited abdominal ultrasound.    COMPARISON: Outside CT 1/26/2024.    FINDINGS:    Small volume abdominal ascites.      Impression    IMPRESSION:  Small volume abdominal ascites, not enough to perform paracentesis  safely.    TANIA KEN MD         SYSTEM ID:  [id number]       Discharge Medications   Current Discharge Medication List        START taking these medications    Details   lactulose (CHRONULAC) 10 GM/15ML solution Take 15 mLs (10 g) by mouth daily  Qty: 947 mL, Refills: 0    Comments: Future refills by PCP Dr. Chuck Shafer with phone number [phone].  Associated Diagnoses: Hepatic encephalopathy (H)      rifaximin (XIFAXAN) 550 MG TABS tablet 1 tablet (550 mg) by Oral or NG Tube route 2 times daily  Qty: 60 tablet, Refills: 0    Comments: Future refills by PCP Dr. Chuck Shafer with phone number [phone].  Associated Diagnoses: Hepatic encephalopathy (H)           CONTINUE these medications which have CHANGED    Details   furosemide (LASIX) 20 MG tablet Take 1 tablet (20 mg) by mouth daily    Associated Diagnoses: Alcoholic cirrhosis of liver with ascites (H)           CONTINUE these medications which have NOT CHANGED    Details   metFORMIN (GLUCOPHAGE) 500 MG tablet Take 500 mg by mouth daily       pantoprazole (PROTONIX) 40 MG EC tablet Take 1 tablet (40 mg) by mouth daily  Qty: 90 tablet, Refills: 1    Associated Diagnoses: Gastroesophageal reflux disease with esophagitis, unspecified whether hemorrhage; UGIB (upper gastrointestinal bleed)           STOP taking these medications       nadolol (CORGARD) 20 MG tablet Comments:   Reason for Stopping:         spironolactone (ALDACTONE) 25 MG tablet Comments:   Reason for Stopping:             Allergies   Allergies   Allergen Reactions    Influenza Virus Vaccine [id number] Anaphylaxis     twice      Iodine Anaphylaxis, Nausea and Vomiting and Hives     Patient was given [id number] IV contrast for CT scan. Patient had immediate contrast reaction, symptoms include anaphylaxis, vomiting, hives, swollen lips and tongue. Patient was given 0.3mg Epi pen and 50 mg diphenhydramine via IV. Patient continued to have hives and vomiting, swollen lips and tongue, sent to ER. Per radiologist patient MUST be premedicated and scanned in a hospital setting due to reaction. KM  Other Reaction(s): hives, swelling lips/tongue, vomiting, anaphylaxis    Contrast Dye      Other Reaction(s): hives, swelling lips/tongue, vomiting, anaphylaxis 2012.    1/26/24 Pt received iohexol 350 mgI/mL (OMNIPAQUE) at [facility]. Received Diphenhydramine 50mg IV prior. Pt tolerated contrast dye with no adverse effects.

## 2024-02-01 ENCOUNTER — PATIENT OUTREACH (OUTPATIENT)
Dept: CARE COORDINATION | Facility: CLINIC | Age: 59
End: 2024-02-01
Payer: COMMERCIAL

## 2024-02-01 ENCOUNTER — TELEPHONE (OUTPATIENT)
Dept: GASTROENTEROLOGY | Facility: CLINIC | Age: 59
End: 2024-02-01
Payer: COMMERCIAL

## 2024-02-01 ENCOUNTER — PATIENT OUTREACH (OUTPATIENT)
Dept: GASTROENTEROLOGY | Facility: CLINIC | Age: 59
End: 2024-02-01
Payer: COMMERCIAL

## 2024-02-01 LAB
BACTERIA BLD CULT: NO GROWTH
BACTERIA BLD CULT: NO GROWTH

## 2024-02-01 NOTE — PROGRESS NOTES
[facility]    Background: Transitional Care Management program identified per system criteria and reviewed by [facility] team for possible outreach.    Assessment: Upon chart review, [facility] Team member will not proceed with patient outreach related to this episode of Transitional Care Management program due to reason below:    Patient has active communication with a nurse, provider or care team for reason of post-hospital follow up plan.  Outreach call by [facility] team not indicated to minimize duplicative efforts.     Patient is in active communication today via MyChart and telephone call with providers in Gastroenterology from [facility] [place] for Clinic Care Coordination - Post-Hospital. No [facility] outreach call needed at this time.     Plan: Transitional Care Management episode addressed appropriately per reason noted above.      NINA Rouse  [phone]  [facility]     *Connected Care Resource Team does NOT follow patient ongoing. Referrals are identified based on internal discharge reports and the outreach is to ensure patient has an understanding of their discharge instructions.

## 2024-02-01 NOTE — PROGRESS NOTES
Hepatology post hospital discharge RNCC assessment    Post hospital discharge follow up:      Admission diagnosis:  Septic shock, failed TIPS procedure  Discharge diagnosis:  Hepatic encephalopathy, alcoholic cirrhosis of liver with ascites    Admitted on: 1/26/24  Discharged on:  1/31/24    Medication Review    Medication review completed.    Discharge medication changes reviewed.   Patient did have new medications prescribed in hospital.   - Lactulose 15mL daily   - rifaximin 550mg BID   - spironolactone discontinued, furosemide decreased to 20mg daily    Follow Up Post Discharge    Reviewed discharge instructions with patient. Follow up appointments reviewed and transportation to appointments confirmed.   Patient able to verbalized understanding of discharge instructions including medications and follow up.      Care coordinator role and contact information reviewed, and pt encouraged to call with questions or concerns.     Symptom Review    1.  Ascites:  Denies abdominal distention  2.  Edema:  Leg swelling has improved since hospitalization, but is still present and sometimes painful. Discussed with pt that leg swelling can persist for a month after TIPS placement and there is room to increase diuretics. Pt states the swelling isn't so bad that he would like to increase diuretics at this time, endorses that he would like to try getting off of as many medications as he can. Discussed other methods of decreasing leg swelling such as leg elevation, compression socks, and maintaining a low sodium diet.  3.  Encephalopathy:  Pt continues with lactulose 15mL daily and rifaximin 550mg BID. Pt states he has been having 7-9 soft stools daily. Reports increased fatigue, denies any dizziness, lightheadedness, dry mouth, or other symptoms of dehydration. Denies any overt confusion. Advised pt to skip tomorrow and Friday's doses of lactulose, will plan to check in on Friday for further lactulose management.  4.  Other:   Denies any fevers, chills, or sweats. Denies any melena, hematochezia, or hematemesis. Is scheduled for consult regarding hernia repair surgery on Friday 2/9.    Collaboration    Above discussed with Dr. Watkins.  Orders received.      Plan    Labs on Friday 2/9  Continue monitoring leg swelling and management of hepatic encephalopathy      Patient was given an opportunity to ask questions and have those questions answered to his satisfaction.  Patient verbalized understanding of instructions provided and agreed to plan of care.      Salome Marie RN Care Coordinator  [facility]  Hepatology Clinic/Specialty Program

## 2024-02-01 NOTE — TELEPHONE ENCOUNTER
[facility]; manually schedule pt into 2/2 at 10:30 am Vit[facility] visit, approved per provider- KB 2.1.24//

## 2024-02-02 ENCOUNTER — VIRTUAL VISIT (OUTPATIENT)
Dept: GASTROENTEROLOGY | Facility: CLINIC | Age: 59
End: 2024-02-02
Attending: STUDENT IN AN ORGANIZED HEALTH CARE EDUCATION/TRAINING PROGRAM
Payer: COMMERCIAL

## 2024-02-02 DIAGNOSIS — K70.31 ALCOHOLIC CIRRHOSIS OF LIVER WITH ASCITES (H): Primary | ICD-10-CM

## 2024-02-02 PROCEDURE — 99214 OFFICE O/P EST MOD 30 MIN: CPT | Mod: 95 | Performed by: STUDENT IN AN ORGANIZED HEALTH CARE EDUCATION/TRAINING PROGRAM

## 2024-02-02 RX ORDER — FUROSEMIDE 20 MG
20 TABLET ORAL DAILY
Qty: 90 TABLET | Refills: 0 | Status: SHIPPED | OUTPATIENT
Start: 2024-02-02 | End: 2024-03-04

## 2024-02-02 NOTE — PROGRESS NOTES
Virtual Visit Details    Type of service:  Video Visit     Originating Location (pt. Location): Home  Distant Location (provider location):  Off-site  Platform used for Video Visit: Freddy  Start Time: 10:35 AM  End Time: 10:58 AM    [facility] Return Patient Visit    Date of Visit: February 2, 2024    Reason for referral: alcohol related liver disease    Subjective: Mr. Bentley is a 58 year old man with history of alcohol use disorder, alcohol-related liver disease with variceal bleeding, presents for evaluation of his liver disease and new onset ascites.    No previous known liver disease, abnormal LFTs, imaging tests. No known history of liver decompensation    He had a CT scan September 18 that showed ascites extending into his umbilical hernia.  Was given oral antibiotics for concern for cellulitis around his hernia.  Also showed cirrhosis with portal hypertension.  Concern for gastritis and esophagitis seen on his CT scan.  Ascites was first seen on imaging July 2023.  He has never had a paracentesis.  Is not on diuretics.  Has been losing weight in terms of muscle mass over the past year.  Is having a hard time eating.  Hernia is uncomfortable due to distention.    Labs from September 18 showed total bilirubin 2.6, liver enzymes are otherwise normal. INR was 1.2.  Sodium 132, creatinine 0.91.  Last drink was around September 17.  Was sober for 5 months after variceal bleeding in the past.  Has not done any alcohol counseling or treatment    Labs from July 4 showed total bilirubin 2.1, .  Labs from September 2022 showed total bilirubin 1.6, AST 64.    He saw surgeon October 2022 for his worsening hernia, they were concerned it was worsening due to ascites.  Recommended he follow-up with GI.    He was admitted to the hospital December 2020 for GI bleeding related to variceal bleeding.  Underwent banding.  At that time he had perihepatic ascites on imaging.  Followed up with  [facility] for follow-up endoscopies with banding.  Last EGD was somewhere in 2021.  Does not think he was seen in the clinic.    Taking omeprazole 20 mg daily for GERD    Interval Events:   - Underwent TIPS 1/24 - felt well after this but 2 days later started to get fatigued and was acting weird. Was eating custard with the wrong end of a spoon.  Suddenly started acting more weird, wife brought him into [facility].  He had a fever to 100.5.  Had blood cultures ended up being positive for diphtheroids that was likely contaminant.  Was transferred to [facility].  Had abdominal imaging that showed area of decreased perfusion around his TIPS liver enzymes were elevated.  No clear infection was found.  Improved with supportive care and antibiotics.  Was discharged earlier this week.  Had some issues with lower extremity swelling over the weekend, is better now.  Does not think his ascites is worse.  The hernia is a little more bothersome and harder to reduce now.  Taking lactulose once or twice a day to have 2-3 bowel once a day.  Also on rifaximin.  No recurrent confusion.  No longer taking spironolactone.  Otherwise feels okay.    ROS: 14 point ROS negative except for positives noted in HPI.    PMHx:  AUD in early remission  ARLD with variceal bleeding and ascites  GERD  DM -better controlled with weight loss due to cirrhosis    PSHx:  Past Surgical History:   Procedure Laterality Date     APPENDECTOMY       COLECTOMY PARTIAL       COLONOSCOPY       ESOPHAGOSCOPY, GASTROSCOPY, DUODENOSCOPY (EGD), COMBINED N/A 10/09/2020    Procedure: ESOPHAGOGASTRODUODENOSCOPY (EGD);  Surgeon: Mary Wheatley DO;  Location:  GI     IR TRANSVEN INTRAHEPATIC PORTOSYST SHUNT  1/24/2024       FamHx:  No family history of liver disease, liver cancer    SocHx:  Social History     Socioeconomic History     Marital status:      Spouse name: Not on file     Number of children: Not on file     Years of education: Not on  file     Highest education level: Not on file   Occupational History     Not on file   Tobacco Use     Smoking status: Every Day     Types: Cigarettes     Smokeless tobacco: Never   Vaping Use     Vaping Use: Never used   Substance and Sexual Activity     Alcohol use: Not Currently     Comment: Sober since 9/16/23     Drug use: Not Currently     Sexual activity: Not on file   Other Topics Concern     Not on file   Social History Narrative     Not on file     Social Determinants of Health     Financial Resource Strain: Not on file   Food Insecurity: Not on file   Transportation Needs: Not on file   Physical Activity: Not on file   Stress: Not on file   Social Connections: Not on file   Interpersonal Safety: Not on file   Housing Stability: Not on file       Medications:  Current Outpatient Medications   Medication     furosemide (LASIX) 20 MG tablet     furosemide (LASIX) 20 MG tablet     lactulose (CHRONULAC) 10 GM/15ML solution     metFORMIN (GLUCOPHAGE) 500 MG tablet     pantoprazole (PROTONIX) 40 MG EC tablet     rifaximin (XIFAXAN) 550 MG TABS tablet     No current facility-administered medications for this visit.     No OTCs, herbals    Allergies:  Allergies   Allergen Reactions     Influenza Virus Vaccine [id number] Anaphylaxis     twice       Iodine Anaphylaxis, Nausea and Vomiting and Hives     Patient was given [id number] IV contrast for CT scan. Patient had immediate contrast reaction, symptoms include anaphylaxis, vomiting, hives, swollen lips and tongue. Patient was given 0.3mg Epi pen and 50 mg diphenhydramine via IV. Patient continued to have hives and vomiting, swollen lips and tongue, sent to ER. Per radiologist patient MUST be premedicated and scanned in a hospital setting due to reaction. KM  Other Reaction(s): hives, swelling lips/tongue, vomiting, anaphylaxis     Contrast Dye      Other Reaction(s): hives, swelling lips/tongue, vomiting, anaphylaxis 2012.    1/26/24 Pt received iohexol 350 mgI/mL  (OMNIPAQUE) at [facility]. Received Diphenhydramine 50mg IV prior. Pt tolerated contrast dye with no adverse effects.        Objective:  There were no vitals taken for this visit.  Constitutional: pleasant man in NAD  Eyes: non icteric  Respiratory: Normal respiratory excursion   MSK: normal range of motion of visualized extremities  Skin: No jaundice  Psychiatric: normal mood and orientation    Labs:  Last Comprehensive Metabolic Panel:  Sodium   Date Value Ref Range Status   01/31/2024 130 (L) 135 - 145 mmol/L Final     Comment:     Reference intervals for this test were updated on 09/26/2023 to more accurately reflect our healthy population. There may be differences in the flagging of prior results with similar values performed with this method. Interpretation of those prior results can be made in the context of the updated reference intervals.    10/12/2020 139 133 - 144 mmol/L Final     Potassium   Date Value Ref Range Status   01/31/2024 4.1 3.4 - 5.3 mmol/L Final   10/12/2020 4.2 3.4 - 5.3 mmol/L Final     Chloride   Date Value Ref Range Status   01/31/2024 101 98 - 107 mmol/L Final   10/12/2020 108 94 - 109 mmol/L Final     Carbon Dioxide   Date Value Ref Range Status   10/12/2020 26 20 - 32 mmol/L Final     Carbon Dioxide (CO2)   Date Value Ref Range Status   01/31/2024 24 22 - 29 mmol/L Final     Anion Gap   Date Value Ref Range Status   01/31/2024 5 (L) 7 - 15 mmol/L Final   10/12/2020 6 3 - 14 mmol/L Final     Glucose   Date Value Ref Range Status   01/31/2024 137 (H) 70 - 99 mg/dL Final   10/12/2020 127 (H) 70 - 99 mg/dL Final     GLUCOSE BY METER POCT   Date Value Ref Range Status   01/31/2024 162 (H) 70 - 99 mg/dL Final     Urea Nitrogen   Date Value Ref Range Status   01/31/2024 8.2 6.0 - 20.0 mg/dL Final   10/12/2020 14 7 - 30 mg/dL Final     Creatinine   Date Value Ref Range Status   01/31/2024 0.78 0.67 - 1.17 mg/dL Final   10/12/2020 0.92 0.66 - 1.25 mg/dL Final     GFR Estimate   Date  Value Ref Range Status   01/31/2024 >90 >60 mL/min/1.73m2 Final   10/12/2020 >90 >60 mL/min/[1.73_m2] Final     Comment:     Non  GFR Calc  Starting 12/18/2018, serum creatinine based estimated GFR (eGFR) will be   calculated using the Chronic Kidney Disease Epidemiology Collaboration   (CKD-EPI) equation.       Calcium   Date Value Ref Range Status   01/31/2024 8.0 (L) 8.6 - 10.0 mg/dL Final   10/12/2020 8.2 (L) 8.5 - 10.1 mg/dL Final     Bilirubin Total   Date Value Ref Range Status   01/31/2024 2.7 (H) <=1.2 mg/dL Final   10/12/2020 0.6 0.2 - 1.3 mg/dL Final     Alkaline Phosphatase   Date Value Ref Range Status   01/31/2024 104 40 - 150 U/L Final     Comment:     Reference intervals for this test were updated on 11/14/2023 to more accurately reflect our healthy population. There may be differences in the flagging of prior results with similar values performed with this method. Interpretation of those prior results can be made in the context of the updated reference intervals.   10/12/2020 65 40 - 150 U/L Final     ALT   Date Value Ref Range Status   01/31/2024 182 (H) 0 - 70 U/L Final     Comment:     Reference intervals for this test were updated on 6/12/2023 to more accurately reflect our healthy population. There may be differences in the flagging of prior results with similar values performed with this method. Interpretation of those prior results can be made in the context of the updated reference intervals.     10/12/2020 39 0 - 70 U/L Final     AST   Date Value Ref Range Status   01/31/2024 73 (H) 0 - 45 U/L Final     Comment:     Reference intervals for this test were updated on 6/12/2023 to more accurately reflect our healthy population. There may be differences in the flagging of prior results with similar values performed with this method. Interpretation of those prior results can be made in the context of the updated reference intervals.   10/12/2020 36 0 - 45 U/L Final       Lab  Results   Component Value Date    WBC 7.4 10/12/2020     Lab Results   Component Value Date    RBC 2.88 10/12/2020     Lab Results   Component Value Date    HGB 8.5 10/12/2020     Lab Results   Component Value Date    HCT 27.5 10/12/2020     Lab Results   Component Value Date    MCV 96 10/12/2020     Lab Results   Component Value Date    MCH 29.5 10/12/2020     Lab Results   Component Value Date    MCHC 30.9 10/12/2020     Lab Results   Component Value Date    RDW 14.9 10/12/2020     Lab Results   Component Value Date    PLT 85 10/12/2020       INR   Date Value Ref Range Status   01/28/2024 1.48 (H) 0.85 - 1.15 Final   10/11/2020 1.16 (H) 0.86 - 1.14 Final        MELD 3.0: 20 at 1/30/2024  6:59 AM  MELD-Na: 20 at 1/30/2024  6:59 AM  Calculated from:  Serum Creatinine: 0.77 mg/dL (Using min of 1 mg/dL) at 1/30/2024  6:59 AM  Serum Sodium: 131 mmol/L at 1/30/2024  6:59 AM  Total Bilirubin: 3.2 mg/dL at 1/30/2024  6:59 AM  Serum Albumin: 2.5 g/dL at 1/30/2024  6:59 AM  INR(ratio): 1.48 at 1/28/2024  5:45 AM  Age at listing (hypothetical): 58 years  Sex: Male at 1/30/2024  6:59 AM      Imaging:    CT 1/2024    1.  Status post TIPS.   2.  Extensive nonspecific diminished attenuation throughout the right hepatic lobe. Considerations would include hepatitis, vascular insult, or possibly trauma from the recent TIPS procedure. There is no well-defined abscess or active bleeding site demonstrated.   3.  Cholelithiasis. No evidence of cholecystitis.   4.  Splenomegaly.   5.  Fluid containing umbilical hernia.   6.  Small amount of free intraperitoneal fluid.       CT AP 9/2023    1.  Ascites extends into an umbilical hernia sac.   2.  Cirrhosis with portal hypertension.   3.  The stomach is largely collapsed, but does appear thick walled. The distal esophagus also appears thick-walled. Consider gastritis/esophagitis as well as other processes.   4.  Cholelithiasis.   5.  Colonic diverticulosis.   6.  Stable abdominal and  retroperitoneal adenopathy.       Endoscopy:    EGD 10/2020:    Impression:          - Bleeding large (> 5 mm) esophageal varices. Completely                        eradicated. Bandedx3.                        - Clotted blood in the gastric fundus and in the gastric                        body.                        - No specimens collected.     Independently reviewed labs and imaging.     Assessment/Plan: Mr. Bentley is a 58-year-old male with a history of alcohol use disorder in early remission (September 2023), alcohol-related liver disease complicated by variceal bleeding in 2020 and ascites.    The fall he developed progressively worsening liver function including ascites and variceal bleeding.  Underwent a TIPS January 24.  Initially did well but 2 days later developed confusion, hypotension and potential infection.  Imaging showed decreased enhancement of the hepatic parenchyma around his TIPS, with elevated liver enzymes.  Likely an area of relative ischemia post TIPS.  Unclear if he had a real infection.  Did not appear to have issues with bleeding related to the TIPS.  Was hospitalized and treated with symptomatic management and improved.  He is on lactulose and rifaximin not having encephalopathy.  He feels the fluid is better already.  Having some issues with lower extremity swelling, discussed that this can happen after TIPS for little bit.    He still having issues with his umbilical hernia, it is causing more issues since his TIPS.  Will refer him to general surgery here in [place] to discuss elective repair.  Would like his ascites to be resolved and liver enzymes to better prior to undergoing surgery, but I worry that he is at risk for incarceration.    Continue lactulose once or twice a day to have 2-3 bowel movements a day.  Continue rifaximin twice a day  Continue Lasix 20 mg a day, can increase if the swelling is worse.  He has upcoming appointment for paracentesis if needed.  Imaging and  AFP every 6 months for liver cancer screening.  Referral to general surgery to discuss umbilical hernia repair, will recheck liver enzymes next week.    Orders Placed This Encounter   Procedures     CBC with platelets     Comprehensive metabolic panel     INR     Adult General Surg Referral     RTC 3 months.    Bee Watkins MD MS  Hepatology/Liver Transplant  [facility]

## 2024-02-02 NOTE — NURSING NOTE
Is the patient currently in the state of [place]? YES    Visit mode:VIDEO    If the visit is dropped, the patient can be reconnected by: VIDEO VISIT: Send to e-mail at: [email or web address]    Will anyone else be joining the visit? NO  (If patient encounters technical issues they should call [phone]    How would you like to obtain your AVS? MyChart    Are changes needed to the allergy or medication list? No    Reason for visit: ZOE MENDOZA

## 2024-02-02 NOTE — LETTER
2/2/2024         RE: Federico Bentley  [address] [place] [address]        Dear Colleague,    Thank you for referring your patient, Federico Bentley, to the [facility] [place]. Please see a copy of my visit note below.    Virtual Visit Details    Type of service:  Video Visit     Originating Location (pt. Location): Home  Distant Location (provider location):  Off-site  Platform used for Video Visit: Sandag  Start Time: 10:35 AM  End Time: 10:58 AM    [facility] Return Patient Visit    Date of Visit: February 2, 2024    Reason for referral: alcohol related liver disease    Subjective: Mr. Bentley is a 58 year old man with history of alcohol use disorder, alcohol-related liver disease with variceal bleeding, presents for evaluation of his liver disease and new onset ascites.    No previous known liver disease, abnormal LFTs, imaging tests. No known history of liver decompensation    He had a CT scan September 18 that showed ascites extending into his umbilical hernia.  Was given oral antibiotics for concern for cellulitis around his hernia.  Also showed cirrhosis with portal hypertension.  Concern for gastritis and esophagitis seen on his CT scan.  Ascites was first seen on imaging July 2023.  He has never had a paracentesis.  Is not on diuretics.  Has been losing weight in terms of muscle mass over the past year.  Is having a hard time eating.  Hernia is uncomfortable due to distention.    Labs from September 18 showed total bilirubin 2.6, liver enzymes are otherwise normal. INR was 1.2.  Sodium 132, creatinine 0.91.  Last drink was around September 17.  Was sober for 5 months after variceal bleeding in the past.  Has not done any alcohol counseling or treatment    Labs from July 4 showed total bilirubin 2.1, .  Labs from September 2022 showed total bilirubin 1.6, AST 64.    He saw surgeon October 2022 for his worsening hernia, they were  concerned it was worsening due to ascites.  Recommended he follow-up with GI.    He was admitted to the hospital December 2020 for GI bleeding related to variceal bleeding.  Underwent banding.  At that time he had perihepatic ascites on imaging.  Followed up with [facility] for follow-up endoscopies with banding.  Last EGD was somewhere in 2021.  Does not think he was seen in the clinic.    Taking omeprazole 20 mg daily for GERD    Interval Events:   - Underwent TIPS 1/24 - felt well after this but 2 days later started to get fatigued and was acting weird. Was eating custard with the wrong end of a spoon.  Suddenly started acting more weird, wife brought him into [facility].  He had a fever to 100.5.  Had blood cultures ended up being positive for diphtheroids that was likely contaminant.  Was transferred to [facility].  Had abdominal imaging that showed area of decreased perfusion around his TIPS liver enzymes were elevated.  No clear infection was found.  Improved with supportive care and antibiotics.  Was discharged earlier this week.  Had some issues with lower extremity swelling over the weekend, is better now.  Does not think his ascites is worse.  The hernia is a little more bothersome and harder to reduce now.  Taking lactulose once or twice a day to have 2-3 bowel once a day.  Also on rifaximin.  No recurrent confusion.  No longer taking spironolactone.  Otherwise feels okay.    ROS: 14 point ROS negative except for positives noted in HPI.    PMHx:  AUD in early remission  ARLD with variceal bleeding and ascites  GERD  DM -better controlled with weight loss due to cirrhosis    PSHx:  Past Surgical History:   Procedure Laterality Date    APPENDECTOMY      COLECTOMY PARTIAL      COLONOSCOPY      ESOPHAGOSCOPY, GASTROSCOPY, DUODENOSCOPY (EGD), COMBINED N/A 10/09/2020    Procedure: ESOPHAGOGASTRODUODENOSCOPY (EGD);  Surgeon: Mary Wheatley DO;  Location: [facility]    IR TRANSVEN INTRAHEPATIC PORTOSYST  SHUNT  1/24/2024       FamHx:  No family history of liver disease, liver cancer    SocHx:  Social History     Socioeconomic History    Marital status:      Spouse name: Not on file    Number of children: Not on file    Years of education: Not on file    Highest education level: Not on file   Occupational History    Not on file   Tobacco Use    Smoking status: Every Day     Types: Cigarettes    Smokeless tobacco: Never   Vaping Use    Vaping Use: Never used   Substance and Sexual Activity    Alcohol use: Not Currently     Comment: Sober since 9/16/23    Drug use: Not Currently    Sexual activity: Not on file   Other Topics Concern    Not on file   Social History Narrative    Not on file     Social Determinants of Health     Financial Resource Strain: Not on file   Food Insecurity: Not on file   Transportation Needs: Not on file   Physical Activity: Not on file   Stress: Not on file   Social Connections: Not on file   Interpersonal Safety: Not on file   Housing Stability: Not on file       Medications:  Current Outpatient Medications   Medication    furosemide (LASIX) 20 MG tablet    furosemide (LASIX) 20 MG tablet    lactulose (CHRONULAC) 10 GM/15ML solution    metFORMIN (GLUCOPHAGE) 500 MG tablet    pantoprazole (PROTONIX) 40 MG EC tablet    rifaximin (XIFAXAN) 550 MG TABS tablet     No current facility-administered medications for this visit.     No OTCs, herbals    Allergies:  Allergies   Allergen Reactions    Influenza Virus Vaccine H5n1 Anaphylaxis     twice      Iodine Anaphylaxis, Nausea and Vomiting and Hives     Patient was given Hexysplrh431 IV contrast for CT scan. Patient had immediate contrast reaction, symptoms include anaphylaxis, vomiting, hives, swollen lips and tongue. Patient was given 0.3mg Epi pen and 50 mg diphenhydramine via IV. Patient continued to have hives and vomiting, swollen lips and tongue, sent to ER. Per radiologist patient MUST be premedicated and scanned in a hospital setting  due to reaction. KM  Other Reaction(s): hives, swelling lips/tongue, vomiting, anaphylaxis    Contrast Dye      Other Reaction(s): hives, swelling lips/tongue, vomiting, anaphylaxis 2012.    1/26/24 Pt received iohexol 350 mgI/mL (OMNIPAQUE) at [facility]. Received Diphenhydramine 50mg IV prior. Pt tolerated contrast dye with no adverse effects.        Objective:  There were no vitals taken for this visit.  Constitutional: pleasant man in NAD  Eyes: non icteric  Respiratory: Normal respiratory excursion   MSK: normal range of motion of visualized extremities  Skin: No jaundice  Psychiatric: normal mood and orientation    Labs:  Last Comprehensive Metabolic Panel:  Sodium   Date Value Ref Range Status   01/31/2024 130 (L) 135 - 145 mmol/L Final     Comment:     Reference intervals for this test were updated on 09/26/2023 to more accurately reflect our healthy population. There may be differences in the flagging of prior results with similar values performed with this method. Interpretation of those prior results can be made in the context of the updated reference intervals.    10/12/2020 139 133 - 144 mmol/L Final     Potassium   Date Value Ref Range Status   01/31/2024 4.1 3.4 - 5.3 mmol/L Final   10/12/2020 4.2 3.4 - 5.3 mmol/L Final     Chloride   Date Value Ref Range Status   01/31/2024 101 98 - 107 mmol/L Final   10/12/2020 108 94 - 109 mmol/L Final     Carbon Dioxide   Date Value Ref Range Status   10/12/2020 26 20 - 32 mmol/L Final     Carbon Dioxide (CO2)   Date Value Ref Range Status   01/31/2024 24 22 - 29 mmol/L Final     Anion Gap   Date Value Ref Range Status   01/31/2024 5 (L) 7 - 15 mmol/L Final   10/12/2020 6 3 - 14 mmol/L Final     Glucose   Date Value Ref Range Status   01/31/2024 137 (H) 70 - 99 mg/dL Final   10/12/2020 127 (H) 70 - 99 mg/dL Final     GLUCOSE BY METER POCT   Date Value Ref Range Status   01/31/2024 162 (H) 70 - 99 mg/dL Final     Urea Nitrogen   Date Value Ref Range  Status   01/31/2024 8.2 6.0 - 20.0 mg/dL Final   10/12/2020 14 7 - 30 mg/dL Final     Creatinine   Date Value Ref Range Status   01/31/2024 0.78 0.67 - 1.17 mg/dL Final   10/12/2020 0.92 0.66 - 1.25 mg/dL Final     GFR Estimate   Date Value Ref Range Status   01/31/2024 >90 >60 mL/min/1.73m2 Final   10/12/2020 >90 >60 mL/min/[1.73_m2] Final     Comment:     Non  GFR Calc  Starting 12/18/2018, serum creatinine based estimated GFR (eGFR) will be   calculated using the Chronic Kidney Disease Epidemiology Collaboration   (CKD-EPI) equation.       Calcium   Date Value Ref Range Status   01/31/2024 8.0 (L) 8.6 - 10.0 mg/dL Final   10/12/2020 8.2 (L) 8.5 - 10.1 mg/dL Final     Bilirubin Total   Date Value Ref Range Status   01/31/2024 2.7 (H) <=1.2 mg/dL Final   10/12/2020 0.6 0.2 - 1.3 mg/dL Final     Alkaline Phosphatase   Date Value Ref Range Status   01/31/2024 104 40 - 150 U/L Final     Comment:     Reference intervals for this test were updated on 11/14/2023 to more accurately reflect our healthy population. There may be differences in the flagging of prior results with similar values performed with this method. Interpretation of those prior results can be made in the context of the updated reference intervals.   10/12/2020 65 40 - 150 U/L Final     ALT   Date Value Ref Range Status   01/31/2024 182 (H) 0 - 70 U/L Final     Comment:     Reference intervals for this test were updated on 6/12/2023 to more accurately reflect our healthy population. There may be differences in the flagging of prior results with similar values performed with this method. Interpretation of those prior results can be made in the context of the updated reference intervals.     10/12/2020 39 0 - 70 U/L Final     AST   Date Value Ref Range Status   01/31/2024 73 (H) 0 - 45 U/L Final     Comment:     Reference intervals for this test were updated on 6/12/2023 to more accurately reflect our healthy population. There may be  differences in the flagging of prior results with similar values performed with this method. Interpretation of those prior results can be made in the context of the updated reference intervals.   10/12/2020 36 0 - 45 U/L Final       Lab Results   Component Value Date    WBC 7.4 10/12/2020     Lab Results   Component Value Date    RBC 2.88 10/12/2020     Lab Results   Component Value Date    HGB 8.5 10/12/2020     Lab Results   Component Value Date    HCT 27.5 10/12/2020     Lab Results   Component Value Date    MCV 96 10/12/2020     Lab Results   Component Value Date    MCH 29.5 10/12/2020     Lab Results   Component Value Date    MCHC 30.9 10/12/2020     Lab Results   Component Value Date    RDW 14.9 10/12/2020     Lab Results   Component Value Date    PLT 85 10/12/2020       INR   Date Value Ref Range Status   01/28/2024 1.48 (H) 0.85 - 1.15 Final   10/11/2020 1.16 (H) 0.86 - 1.14 Final        MELD 3.0: 20 at 1/30/2024  6:59 AM  MELD-Na: 20 at 1/30/2024  6:59 AM  Calculated from:  Serum Creatinine: 0.77 mg/dL (Using min of 1 mg/dL) at 1/30/2024  6:59 AM  Serum Sodium: 131 mmol/L at 1/30/2024  6:59 AM  Total Bilirubin: 3.2 mg/dL at 1/30/2024  6:59 AM  Serum Albumin: 2.5 g/dL at 1/30/2024  6:59 AM  INR(ratio): 1.48 at 1/28/2024  5:45 AM  Age at listing (hypothetical): 58 years  Sex: Male at 1/30/2024  6:59 AM      Imaging:    CT 1/2024    1.  Status post TIPS.   2.  Extensive nonspecific diminished attenuation throughout the right hepatic lobe. Considerations would include hepatitis, vascular insult, or possibly trauma from the recent TIPS procedure. There is no well-defined abscess or active bleeding site demonstrated.   3.  Cholelithiasis. No evidence of cholecystitis.   4.  Splenomegaly.   5.  Fluid containing umbilical hernia.   6.  Small amount of free intraperitoneal fluid.       CT AP 9/2023    1.  Ascites extends into an umbilical hernia sac.   2.  Cirrhosis with portal hypertension.   3.  The stomach is  largely collapsed, but does appear thick walled. The distal esophagus also appears thick-walled. Consider gastritis/esophagitis as well as other processes.   4.  Cholelithiasis.   5.  Colonic diverticulosis.   6.  Stable abdominal and retroperitoneal adenopathy.       Endoscopy:    EGD 10/2020:    Impression:          - Bleeding large (> 5 mm) esophageal varices. Completely                        eradicated. Bandedx3.                        - Clotted blood in the gastric fundus and in the gastric                        body.                        - No specimens collected.     Independently reviewed labs and imaging.     Assessment/Plan: Mr. Bentley is a 58-year-old male with a history of alcohol use disorder in early remission (September 2023), alcohol-related liver disease complicated by variceal bleeding in 2020 and ascites.    The fall he developed progressively worsening liver function including ascites and variceal bleeding.  Underwent a TIPS January 24.  Initially did well but 2 days later developed confusion, hypotension and potential infection.  Imaging showed decreased enhancement of the hepatic parenchyma around his TIPS, with elevated liver enzymes.  Likely an area of relative ischemia post TIPS.  Unclear if he had a real infection.  Did not appear to have issues with bleeding related to the TIPS.  Was hospitalized and treated with symptomatic management and improved.  He is on lactulose and rifaximin not having encephalopathy.  He feels the fluid is better already.  Having some issues with lower extremity swelling, discussed that this can happen after TIPS for little bit.    He still having issues with his umbilical hernia, it is causing more issues since his TIPS.  Will refer him to general surgery here in [place] to discuss elective repair.  Would like his ascites to be resolved and liver enzymes to better prior to undergoing surgery, but I worry that he is at risk for incarceration.    Continue  lactulose once or twice a day to have 2-3 bowel movements a day.  Continue rifaximin twice a day  Continue Lasix 20 mg a day, can increase if the swelling is worse.  He has upcoming appointment for paracentesis if needed.  Imaging and AFP every 6 months for liver cancer screening.  Referral to general surgery to discuss umbilical hernia repair, will recheck liver enzymes next week.    Orders Placed This Encounter   Procedures    CBC with platelets    Comprehensive metabolic panel    INR    Adult General Surg Referral     RTC 3 months.    Bee Watkins MD MS  Hepatology/Liver Transplant  [facility]        Again, thank you for allowing me to participate in the care of your patient.        Sincerely,        Bee Watkins MD

## 2024-02-02 NOTE — TELEPHONE ENCOUNTER
REFERRAL INFORMATION:  Referring Provider: Dr. Watkins  Referring Clinic: [facility] - Hepatology  Reason for Visit/Diagnosis: Umbilical hernia in setting of cirrhosis with ascites        FUTURE VISIT INFORMATION:  Appointment Date: 2024  Appointment Time: 10 AM     NOTES RECORD STATUS  DETAILS   OFFICE NOTE from Referring Provider Internal [facility]:  24, 10/10/23 - HEP OV with Dr. Watkins   OFFICE NOTE from Other Specialists Care Everywhere / [facility]:  23 - RAD ONC OV with Dr. Arenas  23 - HEP OV with OLVIN Morrison    [facility]:  23 - SURG OV with Dr. Valencia    [facility]:  10/25/22 - GEN SURG OV with Dr. Boles   [facility] DISCHARGE SUMMARY/ ED VISITS  Care Everywhere / Internal [facility]:  24 - Admission with Dr. Crawford    [facility]:  24 - ED OV with Dr. Duke  23 - ED OV with Dr. Lyon  * Additional in Care Everywhere    [facility]:  23 - Admission with Dr. Reis   OPERATIVE REPORT N/A    ENDOSCOPY (EGD)  Care Everywhere [facility]:  23 - EGD    [facility]:  23 - EGD   PERTINENT LABS Care Everywhere / Internal    IMAGING (CT, MRI, US, XR)  Received / Internal [facility]:  24 - US Abdomen    [facility]:  24 - CT Chest/Thorax  24, 23, 23, 23, 23 - CT Abd/Pelvis  24, 24, 23, 12/15/23, 11/10/23, 23 - US Abdomen  24, 23 - XR Chest     Records Requested    Facility  [facility]  Fax: [phone]   Outcome * 24 2:53 PM Faxed urgent request to [facility] for images to be pushed to [facility] PACs. - Brianna    * 24 7:29 AM Images received from [facility] and attached to the patient in PACs. - Brianna

## 2024-02-06 ENCOUNTER — TELEPHONE (OUTPATIENT)
Dept: GASTROENTEROLOGY | Facility: CLINIC | Age: 59
End: 2024-02-06
Payer: COMMERCIAL

## 2024-02-06 DIAGNOSIS — Z95.828 S/P TIPS (TRANSJUGULAR INTRAHEPATIC PORTOSYSTEMIC SHUNT): Primary | ICD-10-CM

## 2024-02-09 ENCOUNTER — PRE VISIT (OUTPATIENT)
Dept: SURGERY | Facility: CLINIC | Age: 59
End: 2024-02-09

## 2024-02-09 ENCOUNTER — OFFICE VISIT (OUTPATIENT)
Dept: SURGERY | Facility: CLINIC | Age: 59
End: 2024-02-09
Attending: STUDENT IN AN ORGANIZED HEALTH CARE EDUCATION/TRAINING PROGRAM
Payer: COMMERCIAL

## 2024-02-09 ENCOUNTER — PATIENT OUTREACH (OUTPATIENT)
Dept: GASTROENTEROLOGY | Facility: CLINIC | Age: 59
End: 2024-02-09

## 2024-02-09 ENCOUNTER — LAB (OUTPATIENT)
Dept: LAB | Facility: CLINIC | Age: 59
End: 2024-02-09
Payer: COMMERCIAL

## 2024-02-09 VITALS
OXYGEN SATURATION: 100 % | HEART RATE: 78 BPM | WEIGHT: 186.8 LBS | HEIGHT: 72 IN | SYSTOLIC BLOOD PRESSURE: 112 MMHG | BODY MASS INDEX: 25.3 KG/M2 | DIASTOLIC BLOOD PRESSURE: 61 MMHG

## 2024-02-09 DIAGNOSIS — K70.31 ALCOHOLIC CIRRHOSIS OF LIVER WITH ASCITES (H): ICD-10-CM

## 2024-02-09 DIAGNOSIS — K42.9 UMBILICAL HERNIA WITHOUT OBSTRUCTION AND WITHOUT GANGRENE: Primary | ICD-10-CM

## 2024-02-09 LAB
AFP SERPL-MCNC: 2.6 NG/ML
ALBUMIN SERPL BCG-MCNC: 2.7 G/DL (ref 3.5–5.2)
ALP SERPL-CCNC: 118 U/L (ref 40–150)
ALT SERPL W P-5'-P-CCNC: 42 U/L (ref 0–70)
ANION GAP SERPL CALCULATED.3IONS-SCNC: 7 MMOL/L (ref 7–15)
AST SERPL W P-5'-P-CCNC: 50 U/L (ref 0–45)
BILIRUB SERPL-MCNC: 2.2 MG/DL
BUN SERPL-MCNC: 8 MG/DL (ref 6–20)
CALCIUM SERPL-MCNC: 8.3 MG/DL (ref 8.6–10)
CHLORIDE SERPL-SCNC: 106 MMOL/L (ref 98–107)
CREAT SERPL-MCNC: 0.71 MG/DL (ref 0.67–1.17)
DEPRECATED HCO3 PLAS-SCNC: 25 MMOL/L (ref 22–29)
EGFRCR SERPLBLD CKD-EPI 2021: >90 ML/MIN/1.73M2
ERYTHROCYTE [DISTWIDTH] IN BLOOD BY AUTOMATED COUNT: 21 % (ref 10–15)
GLUCOSE SERPL-MCNC: 163 MG/DL (ref 70–99)
HCT VFR BLD AUTO: 31.6 % (ref 40–53)
HGB BLD-MCNC: 9.5 G/DL (ref 13.3–17.7)
INR PPP: 1.45 (ref 0.85–1.15)
MCH RBC QN AUTO: 26.4 PG (ref 26.5–33)
MCHC RBC AUTO-ENTMCNC: 30.1 G/DL (ref 31.5–36.5)
MCV RBC AUTO: 88 FL (ref 78–100)
PLATELET # BLD AUTO: 82 10E3/UL (ref 150–450)
POTASSIUM SERPL-SCNC: 3.8 MMOL/L (ref 3.4–5.3)
PROT SERPL-MCNC: 6.4 G/DL (ref 6.4–8.3)
RBC # BLD AUTO: 3.6 10E6/UL (ref 4.4–5.9)
SODIUM SERPL-SCNC: 138 MMOL/L (ref 135–145)
WBC # BLD AUTO: 5.6 10E3/UL (ref 4–11)

## 2024-02-09 PROCEDURE — 85027 COMPLETE CBC AUTOMATED: CPT | Performed by: PATHOLOGY

## 2024-02-09 PROCEDURE — 82105 ALPHA-FETOPROTEIN SERUM: CPT | Performed by: PHYSICIAN ASSISTANT

## 2024-02-09 PROCEDURE — 85610 PROTHROMBIN TIME: CPT | Performed by: PATHOLOGY

## 2024-02-09 PROCEDURE — 80053 COMPREHEN METABOLIC PANEL: CPT | Performed by: PATHOLOGY

## 2024-02-09 PROCEDURE — 36415 COLL VENOUS BLD VENIPUNCTURE: CPT | Performed by: PATHOLOGY

## 2024-02-09 PROCEDURE — 99000 SPECIMEN HANDLING OFFICE-LAB: CPT | Performed by: PATHOLOGY

## 2024-02-09 PROCEDURE — G0480 DRUG TEST DEF 1-7 CLASSES: HCPCS | Mod: 90 | Performed by: PATHOLOGY

## 2024-02-09 PROCEDURE — 99214 OFFICE O/P EST MOD 30 MIN: CPT | Performed by: STUDENT IN AN ORGANIZED HEALTH CARE EDUCATION/TRAINING PROGRAM

## 2024-02-09 RX ORDER — CEFAZOLIN SODIUM 2 G/50ML
2 SOLUTION INTRAVENOUS
OUTPATIENT
Start: 2024-02-09

## 2024-02-09 RX ORDER — CEFAZOLIN SODIUM 2 G/50ML
2 SOLUTION INTRAVENOUS SEE ADMIN INSTRUCTIONS
OUTPATIENT
Start: 2024-02-09

## 2024-02-09 ASSESSMENT — PAIN SCALES - GENERAL: PAINLEVEL: NO PAIN (0)

## 2024-02-09 NOTE — LETTER
2/9/2024       RE: Federico Bentley  [address]  [place] [address]     Dear Colleague,    Thank you for referring your patient, Federico Bentley, to the [facility] [place] at [facility]. Please see a copy of my visit note below.    HPI  The patient is a 57 yo M who presents for evaluation of umbilical hernia in the setting of cirrhosis. He recently underwent TIPS in January after suffering esophageal variceal bleeding in December which was treated with banding. He developed sepsis after the TIPS with hepatic encephalopathy and was admitted to the ICU, treated with pressors and IV antibiotics. He recovered and was discharged. Most recent ultrasound showed patent TIPS and minimal ascites. He continues on lasix, lactulose, and rifaximin for control of his ascites and ammonia levels. He also has type 2 diabetes and takes metformin. Listed past surgical history includes appendectomy.    His umbilical hernia is related to his ascites, in the past he has had imaging demonstrating bowel in the hernia, but most recent imaging from 1/26/24 just shows fluid/ascites. He denies any recent obstructive GI symptoms or pain. He denies umbilical skin infections or necrosis. He denies leakage of ascites.    His albumin is 2.5. He is Child-Mark Class A based on minimal ascites, most recent bilirubin 2.2 mg/dL & INR 1.45, and no current encephalopathy.    He does not smoke. He has T2DM. He denies easy bruising or bleeding, but does have documented thrombocytopenia, most recent PLT count 82.      Exam  Vitals:     02/09/24 1011   BP: 112/61   BP Location: Left arm   Patient Position: Sitting   Cuff Size: Adult Regular   Pulse: 78   SpO2: 100%   Weight: 84.7 kg (186 lb 12.8 oz)   Height: 1.829 m (6')            Body mass index is 25.33 kg/m .    On exam, his sclera are anicteric, he has peripheral muscle wasting. His abdomen is soft, non-distended,  non-tender, without appreciable fluid wave. He has a protuberant umbilical hernia that is easily reducible, with no appreciable bowel contents on my exam, defect is < 2cm. Overlying skin is thinned with minimal discoloration; there are no ulcers, wounds, or necrosis. No other hernias or masses appreciated.      A&P  This is a 57 yo M with an umbilical hernia in the setting of cirrhosis s/p TIPS. Elective umbilical hernia repair is recommended to reduce the risk of morbidity and mortality that would be associated with an emergent repair in this patient. He is currently undergoing regular ultrasound surveillance for his ascites and has not required drainage since his TIPS. We discussed that continued adequate control of his ascites is necessary for a successful outcome. We discussed perioperative risk of bleeding which would be slightly increased given his thrombocytopenia but by no means prohibitive; surgical site and intraabdominal infection; hernia recurrence; and also because of his cirrhosis, risk of postoperative ascites, jaundice, encephalopathy, and liver failure; and, based on his current Child-Mark score (Class A), studies show perioperative mortality rates for abdominal and abdominal wall surgery of  2-10%. He voiced understanding of these risks, the importance of continued EtOH abstinence, and close follow up with hepatology and control of his ascites. He wishes to proceed with repair. Based on his cirrhosis and size of defect <2 cm I anticipate primary suture repair.        Carrie A, Toan W, Phil V, Matilda J. Abdominal operations in patients with cirrhosis: still a major surgical challenge. Surgery 1997; 122:730.  Silvia DA, Andrew T, Goldmakenzie R, et al. Factors that predict outcome of abdominal operations in patients with advanced cirrhosis. Clin Gastroenterol Hepatol 2010; 8:451.  Marissa H, Gee D, Lara MORTON, et al. Perioperative mortality after non-hepatic general surgery in patients  with liver cirrhosis: an analysis of 138 operations in the 2000s using Child and MELD scores. J Gastrointest Surg 2011; 15:1.  Hernandez RN, Cryer HM, Rio DA, Vinicius MORTON Jr. Clarification of risk factors for abdominal operations in patients with hepatic cirrhosis. Helen Surg 1984; 199:648.      Again, thank you for allowing me to participate in the care of your patient.      Sincerely,    Parmjit Beaulieu MD

## 2024-02-09 NOTE — NURSING NOTE
Chief Complaint   Patient presents with    New Patient     Umbilical hernia in setting of cirrhosis with ascites       Vitals:    02/09/24 1011   BP: 112/61   BP Location: Left arm   Patient Position: Sitting   Cuff Size: Adult Regular   Pulse: 78   SpO2: 100%   Weight: 84.7 kg (186 lb 12.8 oz)   Height: 1.829 m (6')       Body mass index is 25.33 kg/m .                          Tres Hawkins, EMT

## 2024-02-09 NOTE — NURSING NOTE
Pre and Post op Patient Education/Teaching Flowsheet  Relevant Diagnosis:  Umbilical Hernia (K42.0)  Teaching Topic:  Pre and post op teaching  Person(s) Involved in teaching:  Patient     Motivation Level:  Asks Questions:  Yes  Eager to Learn:  Yes  Cooperative:  Yes  Receptive (willing/able to accept information):  Yes  Any cultural factors/[place] beliefs that may influence understanding or compliance?  No    Patient/caregiver/family demonstrates understanding of the following:  Reason for the appointment, diagnosis, and treatment plan:  Yes  Patient demonstrates understanding of the following:  Pre-op bowel prep:  No  Post-op pain management recommendations (medications, ice compress, binder/athletic supporter (if applicable), etc.:  Yes  Inguinal hernia patients:  Post-op urinary retention- discussed signs/symptoms and visit to ER for Berger catheter placement and to stay in place for at least 48 hours:  NA  Restrictions:  Yes  Medications to take the day of surgery:  Per PCP  Blood thinner medications discussed and when to stop (if applicable):  Yes  Wound care:  Yes  Diabetes medication management (if applicable):  Per PCP  Which situations necessitate calling provider and whom to contact:  Discussed how to contact the hospital, nurse, and clinic scheduling staff if necessary      Date and time of surgery:  TBD  Location of surgery: [address] [place] [address]  History and Physical and any other testing necessary prior to surgery:  Yes- PAC  Required time line for completion of History and Physical and any pre-op testing:  Yes  Discuss need for someone to drive patient home and stay with them for 24 hours:  Yes  Pre-op showering/scrub information with Surgical Scrub:  Yes  NPO Guidelines:  NPO per Anesthesia Guidelines    Infection Prevention: Patient demonstrates understanding of the following:  Patient instructed on hand hygiene:  Yes  Surgical procedure site care will be taught and will be reviewed at the  time of discharge  Signs and symptoms of infection taught:  Yes  Wound care reviewed and will be taught at the time of discharge  Central venous catheter care will be taught at the time of discharge (if applicable)    Post-op follow-up:  Instructional materials used/given/mailed:  Surgical logistics, post op teaching sheet, and surgical scrub

## 2024-02-09 NOTE — PROGRESS NOTES
"Spoke with pt for check in. He states he has held lactulose yesterday and today, continues with rifaximin. He states that yesterday he had 3-4 stools, today has had 2 so far. Denies any mental fogginess or overt confusion, states he \"feels the same\" as he did while taking lactulose. Provided education on early signs and symptoms of hepatic encephalopathy, advised pt to take a dose of lactulose when he starts experiencing symptoms or has not had at least 3 bowel movements in a day. Pt verbalized understanding, in agreement with plan.  Pt reports he had consult with Dr. Beaulieu regarding hernia repair surgery, plans to move forward with scheduling procedure. Follow-up with Dr. Watkins scheduled for 6/24.  Will follow-up with pt next week.      Salome Marie, RN Care Coordinator  [facility]  Hepatology Clinic/Specialty Program   "

## 2024-02-09 NOTE — PATIENT INSTRUCTIONS
You met with Dr. Parmjit Beaulieu.      Today's visit instructions:    Reminder:  Surgery Requirements  Your surgery will be at [address] [place] [address].  You will need to arrive 2 hours early.  You will need someone to drive you home (over 18 years old) and stay with you for 24 hours after the procedure.  You will need a preop physical with [facility] within 30 days of surgery- closer is always better.  Stop any blood thinners, vitamins, minerals, or herbal supplements 5 days before surgery.  If you are taking a prescribed blood thinner or weight loss medication please let us know for specific instructions.  Fasting- a nurse from [facility] will call you 1-2 days before surgery to confirm your procedure and tell you when to stop eating and drinking.   Wash with the soap (Antibacterial Dial Foaming Complete , Hibiclense, or soap given/mailed from the clinic) the night before surgery and morning of surgery. See instructions in the Surgery Packet.  If you would like a procedure estimate please call Cost of Care at [phone] during the hours of 8 AM - 3 PM (option #2 for on-line request and option #3 for a representative).     If you have questions please contact Stephanie RN or Halina RN during regular clinic hours, Monday through Friday 7:30 AM - 4:00 PM, or you can contact us via [employer] at anytime.       If you have urgent needs after-hours, weekends, or holidays please call the hospital at [phone] and ask to speak with our on-call General Surgery Team.    Appointment [phone]  Nurse Advice (Stephanie or Halina): [phone]   Surgery Scheduler (Miryam): [phone]  Fax: [phone]    After Your Open Ventral/Incisional/Umbilical Hernia Repair          Incision care   You may take a shower the day after surgery. Carefully wash your incision with soap and water. Do not submerge yourself in water (bath, whirlpool, hot tub, pool, lake) for 14 days after surgery. Always wash  your hands before touching your incisions or removing bandages.  Remove the bandage 1-2 days after surgery, but leave the medical tape (Steri-Strips) or glue in place. These will loosen and fall off on their own 1-2 weeks after surgery.     It is not unusual to form a collection of fluid or blood under your incision that may feel firm or squishy- it can take several weeks to months for your body to reabsorb it.  At times, it may even drain.  If that should happen keep the area clean with soap, water,  and cover with a clean gauze dressing. You can change this daily or as needed.      Other medicines   Wait to start aspirin or blood thinners until the day after surgery. You can continue your regular medicines at your normal time the day after surgery.   Your pain medicine may cause constipation (hard, dry stools). To help with this, take the stool softener your doctor gave you or an over-the-counter stool softener or laxative. You can stop taking this when you are no longer taking pain medicine and your bowel movements are back to normal.      For pain or discomfort   Take the narcotic pain medicine your doctor gave you as needed and as instructed on the bottle. If you prefer to use over-the-counter medication, use acetaminophen (Tylenol) or ibuprofen (Advil, Motrin) as instructed on the box. Do not take Tylenol if it is in your narcotic pain medication.   Use an ice pack on your incision (surgical cut) for 20 minutes at a time as needed for the first 24 hours. Be sure to protect your skin by putting a cloth between the ice pack and your skin.   After 24 hours you can switch to heat for 20 minutes as needed. Be sure to protect your skin by putting a cloth between the heat pack and your skin.         Activities   No driving until you feel it s safe to do so. Don t drive while taking narcotic pain medicine.   Don t lift anything heavier than 20 pounds for 3 to 4 weeks after surgery.      Special equipment   If we gave  you an abdominal binder, wear it for the first 30 days after surgery. You don t have to wear it when you re sleeping. You can wear it longer than 30 days if you wish.      Diet   You can eat your regular meals after surgery.     Dental Care  Please avoid dental care for two weeks prior to hernia repair and for 6 weeks after surgery due to the mesh that may be placed.    When to call the doctor   Call your doctor if you have:   A fever above 101 F (38.3 C) (taken under the tongue), or a fever or chills lasting more than a day.   Redness at the incision site.   Any fluid or blood draining from the incision, especially if it smells bad.    Severe pain that doesn t improve with pain medicine.      We will call you 2 to 4 days after surgery to review this handout, answer questions and help arrange after-surgery care. If you have questions or concerns, please call [phone] during regular office hours. If you need to call after business hours, call [phone] and ask to page the surgeon on-call.           Transversus Abdominis Plane (TAP) Pain Block      What is a TAP block?   A TAP block can help you manage your pain after surgery. TAP stands for transversus abdominis plane, which is a muscle layer in your abdomen (belly). The TAP block uses numbing medicine similar to Novocaine to block pain near the site of your surgery.       Why get a TAP block?   To better manage your pain after surgery. A tap block will help keep the pain from getting severe and out of control.   To block pain signals from the nerve, which helps decrease pain after surgery.   To help you sleep, easily breathe deeply, walk and visit with others.      How is it done?   You will lie still on a table. We will use an ultrasound machine to help us see the correct muscle layer of your abdomen. Then, we ll use a needle to inject the medicine. We may also give you some sleep medicine to lessen the pain of the injection.       The procedure takes  between 5 and 15 minutes. It is usually done right before surgery, but will sometimes be after. It depends on your surgery and care needs.      What can I expect?   You may feel numbness, tingling or a heaviness in your abdomen.    You may have pain control up to 72 hours after surgery.   The TAP block may not lessen all of your surgery pain. But most patients feel 50 to 75 percent less pain than without the block.       Tell your nurse if you have:   Numbness or tingling in areas other than where the injection was   Blurry vision   Ringing in your ears   A metallic taste in your mouth

## 2024-02-11 LAB
LABORATORY REPORT: NORMAL
PETH INTERPRETATION: NORMAL
PLPETH BLD-MCNC: <10 NG/ML
POPETH BLD-MCNC: <10 NG/ML

## 2024-02-12 ENCOUNTER — TELEPHONE (OUTPATIENT)
Dept: SURGERY | Facility: CLINIC | Age: 59
End: 2024-02-12
Payer: COMMERCIAL

## 2024-02-12 NOTE — TELEPHONE ENCOUNTER
Left voicemail for patient regarding scheduling surgery with Dr. Parmjit Beaulieu.    Provided contact number to discuss.    P: [phone]    __    Miryam Willard, on 2/12/2024 at 2:10 PM

## 2024-02-12 NOTE — TELEPHONE ENCOUNTER
Patient is scheduled for surgery with Dr. Parmjit Beaulieu    Spoke with: Federico    Date of Surgery: 3/7/2024    Location: [facility]    Informed patient they will need an adult : Yes    Pre op with Provider n/a    H&P: Scheduled with [facility] on 3/1/2024 at 11:30am    Additional imaging/appointments: n/a    Surgery packet: To be sent via [employer]     Additional comments: n/a        Miryam Willard on 2/12/2024 at 3:19 PM

## 2024-02-13 NOTE — PROGRESS NOTES
HPI  The patient is a 59 yo M who presents for evaluation of umbilical hernia in the setting of cirrhosis. He recently underwent TIPS in January after suffering esophageal variceal bleeding in December which was treated with banding. He developed sepsis after the TIPS with hepatic encephalopathy and was admitted to the ICU, treated with pressors and IV antibiotics. He recovered and was discharged. Most recent ultrasound showed patent TIPS and minimal ascites. He continues on lasix, lactulose, and rifaximin for control of his ascites and ammonia levels. He also has type 2 diabetes and takes metformin. Listed past surgical history includes appendectomy.    His umbilical hernia is related to his ascites, in the past he has had imaging demonstrating bowel in the hernia, but most recent imaging from 1/26/24 just shows fluid/ascites. He denies any recent obstructive GI symptoms or pain. He denies umbilical skin infections or necrosis. He denies leakage of ascites.    His albumin is 2.5. He is Child-Mark Class A based on minimal ascites, most recent bilirubin 2.2 mg/dL & INR 1.45, and no current encephalopathy.    He does not smoke. He has T2DM. He denies easy bruising or bleeding, but does have documented thrombocytopenia, most recent PLT count 82.      Exam  Vitals:     02/09/24 1011   BP: 112/61   BP Location: Left arm   Patient Position: Sitting   Cuff Size: Adult Regular   Pulse: 78   SpO2: 100%   Weight: 84.7 kg (186 lb 12.8 oz)   Height: 1.829 m (6')            Body mass index is 25.33 kg/m .    On exam, his sclera are anicteric, he has peripheral muscle wasting. His abdomen is soft, non-distended, non-tender, without appreciable fluid wave. He has a protuberant umbilical hernia that is easily reducible, with no appreciable bowel contents on my exam, defect is < 2cm. Overlying skin is thinned with minimal discoloration; there are no ulcers, wounds, or necrosis. No other hernias or masses appreciated.      A&P  This  is a 59 yo M with an umbilical hernia in the setting of cirrhosis s/p TIPS. Elective umbilical hernia repair is recommended to reduce the risk of morbidity and mortality that would be associated with an emergent repair in this patient. He is currently undergoing regular ultrasound surveillance for his ascites and has not required drainage since his TIPS. We discussed that continued adequate control of his ascites is necessary for a successful outcome. We discussed perioperative risk of bleeding which would be slightly increased given his thrombocytopenia but by no means prohibitive; surgical site and intraabdominal infection; hernia recurrence; and also because of his cirrhosis, risk of postoperative ascites, jaundice, encephalopathy, and liver failure; and, based on his current Child-Mark score (Class A), studies show perioperative mortality rates for abdominal and abdominal wall surgery of  2-10%. He voiced understanding of these risks, the importance of continued EtOH abstinence, and close follow up with hepatology and control of his ascites. He wishes to proceed with repair. Based on his cirrhosis and size of defect <2 cm I anticipate primary suture repair.    Parmjit Beaulieu MD  Fellow, General Surgery      Carrie A, Toan W, Phil V, Matilda DALY. Abdominal operations in patients with cirrhosis: still a major surgical challenge. Surgery 1997; 122:730.  Silvia DA, Andrew T, Goldstone R, et al. Factors that predict outcome of abdominal operations in patients with advanced cirrhosis. Clin Gastroenterol Hepatol 2010; 8:451.  Marissa H, Gee D, Lara MORTON, et al. Perioperative mortality after non-hepatic general surgery in patients with liver cirrhosis: an analysis of 138 operations in the 2000s using Child and MELD scores. J Gastrointest Surg 2011; 15:1.  Hernandez RN, Cryer HM, Rio RANDOLPH, Vinicius MORTON Jr. Clarification of risk factors for abdominal operations in patients with hepatic cirrhosis. Helen Surg 1984;  199:648.

## 2024-02-13 NOTE — TELEPHONE ENCOUNTER
FUTURE VISIT INFORMATION      SURGERY INFORMATION:  Date: 3/7/24- Gen Surgery  Consult: ov 24    RECORDS REQUESTED FROM:       Primary Care Provider: Marcell    Pertinent Medical History: Portal hypertension    Most recent EKG+ Tracin24    Most recent ECHO: 23

## 2024-02-13 NOTE — LETTER
Federico Bentley  [address] [place] [address]    SURGERY PACKET            Your surgery is scheduled:    Date: 3/7/2024  ________________________________    Time: 12:40pm*  ________________________________    Please arrive at:  10:40am*  ______________________    Surgeon's Name: Dr. Parmjit Beaulieu  _______________________  Adult  required upon discharge      Pre-Op Physical Fax Numbers:  [facility] Fax:  [phone] / Phone:  [phone]        Your surgery is located at:      [facility], [address])      [place] [address]      [phone]      [email or web address]  Nurse Line: [phone]  [phone]     *Times are tentative and may change. You can expect a call from the pre-admission nurses to confirm arrival and surgery start time if the times should change.    Pre-operative History & Physical appointment:  Clinic appointment with [facility] (PAC) on 3/1/2024 at 11:30am    Before Your Surgery  For Patients and Visitors at [facility]    Welcome  As you get ready for surgery, you may have a lot of questions.  This brochure will help you know what to expect before and after surgery.  You and your family are the most important members of your health care team.  You will need to take an active role in your care.    Be sure to ask questions and learn all that you can about your surgery.  If you have any safety concerns, we urge you to tell a nurse as soon as possible.   This brochure is for information only.  It does not replace the advice of your doctor.  Always follow your doctor's advice.    Please tell us if you need a .    GETTING READY FOR SURGERY  Always follow your surgeon's instructions.  If you don't, your surgery could be canceled.  Please use the following checklist.    Within 30 Days of Surgery:  Have a  pre-surgery physical exam with your family doctor or partner.  If you use a [facility], all of your information from the pre-op physical will be in the [employer] computer system.  Ask the doctor to send all of your results to the hospital before the surgery.  The doctor also may ask you to bring the results with you on the day of surgery or you can fax them to [facility] Fax:  [phone] / Phone:  [phone].    Tell the doctor if:  You are allergic to latex or rubber  (Latex and rubber gloves are often used in medical care).  You are taking any medicines (including aspirin), vitamins (Vitamin E, Fish Oil, Omegas) or herbal products.  You will need to stop taking some medicines before surgery.  You have any medical problems (allergies, diabetes or heart disease, for example).  You have a pacemaker or an AICD (automatic implanted cardiac defibrillator).  If you do, please bring the ID card with you on the day of surgery.  You are a smoker.  People who smoke have a higher risk of infection after surgery.  Ask your doctor how you can quit smoking.    Within 7 days of Surgery:  Pre-register with the hospital.  Please use the hospital's phone number, [phone] (9:00 AM - 5:30 PM).  Prior to your surgical procedure, a nurse will be contacting you to obtain a health history [facility].  If they do not reach you by noon the day prior to your surgery, your surgery will be cancelled. If you have your Pre-Op Surgical Physical (H&P) with the Anesthesia Team (PAC), you are exempt from this call. Fax:  [phone] / Phone:  [phone].  Additionally, someone from the Admissions Department will also contact you for preregistration ([phone]).    Call your insurance company.  Ask if you need pre-approval for your surgery.  If you do not have insurance, please let us know.    Arrange for someone to drive you home after surgery.  If you will have same-day surgery, you may not drive or take public  transportation home by yourself.  Arrange for someone to stay with you for 24 hours after you go home.  This person must be a responsible adult (ie- Family member or friend).    The Day Before Surgery:   Call your surgeon if there are any changes in your health.  This includes signs of a cold or flu (such as a sore throat, runny nose, cough, rash or fever).  Do not smoke, drink alcohol or take over-the-counter medicine (unless your surgeon tells you to) for 24 hours before and after surgery.  If you take prescribed drugs:  You may need to stop them until after the surgery.  Follow your doctor's orders.  You may resume Aspirin and/or blood thinners after your surgery as directed by your physician/surgeon.  No solid food after midnight.  Clear liquids only after midnight until 2 hours before your surgery. Consume nothing orally for 2 hours before your surgery.  You need to have an empty stomach before surgery.  This will make the surgery as safe as possible.  If you don't follow your doctor's orders, your surgery could be changed to another date.    A nurse will call you within a few days of surgery to go over these and other instructions.    If you do not hear from them, please call them at [facility] Fax:  [phone] / Phone:  [phone]    The Day of Surgery:  Take a shower or bath the night before and the morning of surgery.  Use antiseptic soap or the soap your surgeon gave you.  Gently clean the skin.  Do not shave or scrub your surgery site.  Please remove deodorant, cologne, scented lotion, makeup, nail polish and jewelry (including rings and body piercings).  If you wear artificial nails, please remove at least one nail before coming to the hospital.  Wear clean, loose clothing to the hospital.  Bring these items to the hospital:  Your insurance card.  Money for parking and co-pays (for medicines or the surgery), if needed.   A list of all the medicines you take.  Include vitamins, minerals, herbs and  over-the-counter drugs.  Note any drug allergies.  A copy of your advance health care directive, if you have one.  This tells us what treatment you would want -- and who would make health care decisions -- if you could no longer speak for yourself.  You may request this form in advance or download it from [email or web address].  A case for any glasses, contact lenses, hearing aids or dentures.  Your inhaler or CPAP machine, if you use these at home.    Leave extra cash, jewelry and other valuables at home.    When You Arrive:  When you get to the hospital, you will:  Check in.  If you are under age 18, you must be with a parent or legal guardian.  Sign consent forms, if you haven't already.  These forms state that you know the risks and benefits of surgery.  When you sign the forms, you give us permission to do the surgery.  Do not sign them unless you understand what will happen during and after your surgery.  If you have any questions about your surgery, ask to speak with your doctor before you sign the forms.  If you don't understand the answers, ask again.  Receive a copy of the Patients Bill of Rights.  If you do not receive a copy, please ask for one.  Change into hospital clothes.  Your belongings will be placed in a bag.  We will return them to you after surgery.  Meet with the anesthesia provider.  He or she will tell you what kind of anesthesia (medicine) will be used to keep you comfortable during surgery.    Remember: It's okay to remind doctors and nurses to wash their hands before touching you.     In most cases, your surgeon will use a marker to write his or her initials on the surgery site.  This ensures that the exact site is operated on.    For safety reasons, we will ask you the same questions many times.  For example, we may ask your name and birth date over and over again.    Friends and family may be able to stay with you until it's time for surgery.  If so, while you're in surgery, they  "will be in the waiting area.  Please note that cell phones are not allowed in some patient care areas.    If you have questions about what will happen in the operating room, talk to your care team.    After Surgery:  We will move you to a recovery room where we will watch you closely.  If you have any pain or discomfort, tell your nurse.  He or she will try to make you comfortable.    If you are staying overnight we will move you to your hospital room after you are awake.  If you are going home we will move you to another room.  Friends and family may be able to join you.  The length of time you spend in recovery depends on the type of medicine you received, your medical condition, and the type of surgery you had.    Dealing with pain:  A nurse will check your comfort level often during your stay.  He or she will work with you to manage your pain.  Remember:  All pain is real.  There are many ways to control pain.  We can help you decide what works best for you.  Ask for pain medicine when you need it.  Don't try to \"tough it out\" -- this can make you feel worse.  Always take your medicine as ordered.  Medicine doesn't work the same for everyone.  If your medicine isn't working tell your nurse.  There may be other medicines or treatments we can try.    Going Home:  We will let you know when you're ready to leave the hospital.  Before you leave, we will tell you how to care for yourself at home and prevent infections.  If you do not understand something, please say so.  We will answer any questions you have.  We will then help you get ready to leave.    You must have an adult with you for the first 24 hours after you leave the hospital. Take it easy when you get home.  You will need some time to recover -- you may be more tired than you realize at first.  Rest and relax for at least the first 24 hours at home.  You'll feel better and heal faster if you take good care of yourself.                      Pre-Operative " Surgery Scrub    Purpose:   The skin harbors a large variety of bacteria, both infectious and noninfectious.  Showering with an antiseptic soap prior to an invasive procedure will decrease the number of transient and resident (good and bad) bacteria that is normally found on the skin.    Procedure:    Shower or bathe with 1/2 of the bottle of antiseptic soap the evening before and 1/2 of the bottle the morning of surgery (bathing the day of the procedure is most important).     Apply the soap at full strength (use the entire bottle).  Gently clean the skin, rinse, and dry with a clean towel that is freshly laundered (out of the dryer) and then put on clean clothing that is freshly laundered.      We have given you information regarding pre-op showering.  We recommend that patients wash with an antiseptic soap prior to surgery.  This cleanser will be given to you at the clinic or mailed to your home.  It is advised that you liberally wash the specific area surgery area the night before, and again in the morning before the surgery.  Do not apply lotion afterward.  We would like to keep the skin as clean as possible.    Thank you for following these important instructions.      You have been scheduled for surgery and we would like to give you some information that will assist in helping get the best possible outcome.      Before Surgery:   If for any reason you decide not to have the surgery, please contact our office.  We can easily cancel or reschedule the procedure. Please call the  at [phone].    Any pain related to the surgery that occurs before the surgery needs to be reported and managed by your primary care or referring doctor.    Please keep in mind that the time of surgery is subject to change.  Make sure you have nothing to eat or drink after midnight.  If your surgery is later in the afternoon, this recommendation might change, but not until the day before surgery after the actual time of the  surgery has been established.    After Surgery:  When you are discharged from the recovery room, the nurses will review instructions with you and your caregiver.    Please wash your hands every time you touch the wound or change bandages or dressings.    Do not submerge the wound in water.  You may not use a bathtub or hot tub until the wound is closed.  The wait time frame is generally 2-3 weeks but any open area can be a source of incoming bacteria, so it is better to be on the safe side and avoid the tub until your wound is fully healed.    You may take a shower 24 hours after surgery.  Double check with your surgeon if it is ok for water to run over a wound, whether it has been sutured, stapled, glued or is open.  You may gently wash the wound using the antiseptic soap provided for your pre-surgery showering (do not use a washcloth).  Any mild soap will work as well.    Many surgical wounds will have small white strips of tape on them called Steri Strips.  Do not remove these.  The edges will curl and fall off within 7-10 days with normal showering.    If you are going home with sutures (stitches) or staples, you must return to the clinic to have them taken out, usually within 1-2 weeks.    Signs and symptoms of infection include:  Fever, temperature over 101.5 ' F  Redness  Swelling  Increasing pain  Green or yellow drainage which may or may not have a foul odor.    Symptoms of infection need to be reported to your surgery office. Please call the nurse at [phone].     Day of your surgery or procedure  Please come wearing a mask or other face covering.  When you arrive, we ll ask you some questions to find out if you have any signs or symptoms of COVID-19.  One consistent visitor is allowed for adult inpatients, outpatients, and Emergency Department patients.     Adult surgical patients can have one visitor, only before surgery.    Two consistent visitors are allowed for pediatric patients in all areas  (this guidance is now extended to outpatients).   We ll share your after-care instructions with you and anyone else you name.    Please call your care team, hospital or surgery center if you have any questions. We thank you for your understanding and for choosing [facility] for your care.     Reminder:  Surgery Requirements  Your surgery will be at [address] [place] [address].  You will need to arrive 2 hours early.  You will need someone to drive you home (over 18 years old) and stay with you for 24 hours after the procedure.  You will need a preop physical with [facility] (PAC) within 30 days of surgery- closer is always better.  Stop any blood thinners, vitamins, minerals, or herbal supplements 5 days before surgery.  If you are taking a prescribed blood thinner or weight loss medication please let us know for specific instructions.  Fasting- a nurse from [facility] will call you 1-2 days before surgery to confirm your procedure and tell you when to stop eating and drinking.   Wash with the soap (Antibacterial Dial Foaming Complete , Hibiclense, or soap given/mailed from the clinic) the night before surgery and morning of surgery. See instructions in the Surgery Packet.  If you would like a procedure estimate please call Cost of Care at [phone] during the hours of 8 AM - 3 PM (option #2 for on-line request and option #3 for a representative).      If you have questions please contact Stephanie RN or Halina RN during regular clinic hours, Monday through Friday 7:30 AM - 4:00 PM, or you can contact us via [employer] at anytime.       If you have urgent needs after-hours, weekends, or holidays please call the hospital at [phone] and ask to speak with our on-call General Surgery Team.     Appointment [phone]  Nurse Advice (Stephanie or Halina): [phone]   Surgery Scheduler (Miryam): [phone]  Fax: [phone]     After Your Open Ventral/Incisional/Umbilical Hernia  Repair          Incision care   You may take a shower the day after surgery. Carefully wash your incision with soap and water. Do not submerge yourself in water (bath, whirlpool, hot tub, pool, lake) for 14 days after surgery. Always wash your hands before touching your incisions or removing bandages.  Remove the bandage 1-2 days after surgery, but leave the medical tape (Steri-Strips) or glue in place. These will loosen and fall off on their own 1-2 weeks after surgery.     It is not unusual to form a collection of fluid or blood under your incision that may feel firm or squishy- it can take several weeks to months for your body to reabsorb it.  At times, it may even drain.  If that should happen keep the area clean with soap, water,  and cover with a clean gauze dressing. You can change this daily or as needed.        Other medicines   Wait to start aspirin or blood thinners until the day after surgery. You can continue your regular medicines at your normal time the day after surgery.   Your pain medicine may cause constipation (hard, dry stools). To help with this, take the stool softener your doctor gave you or an over-the-counter stool softener or laxative. You can stop taking this when you are no longer taking pain medicine and your bowel movements are back to normal.      For pain or discomfort   Take the narcotic pain medicine your doctor gave you as needed and as instructed on the bottle. If you prefer to use over-the-counter medication, use acetaminophen (Tylenol) or ibuprofen (Advil, Motrin) as instructed on the box. Do not take Tylenol if it is in your narcotic pain medication.   Use an ice pack on your incision (surgical cut) for 20 minutes at a time as needed for the first 24 hours. Be sure to protect your skin by putting a cloth between the ice pack and your skin.   After 24 hours you can switch to heat for 20 minutes as needed. Be sure to protect your skin by putting a cloth between the heat pack and  your skin.         Activities   No driving until you feel it s safe to do so. Don t drive while taking narcotic pain medicine.   Don t lift anything heavier than 20 pounds for 3 to 4 weeks after surgery.      Special equipment   If we gave you an abdominal binder, wear it for the first 30 days after surgery. You don t have to wear it when you re sleeping. You can wear it longer than 30 days if you wish.      Diet   You can eat your regular meals after surgery.      Dental Care  Please avoid dental care for two weeks prior to hernia repair and for 6 weeks after surgery due to the mesh that may be placed.     When to call the doctor   Call your doctor if you have:   A fever above 101 F (38.3 C) (taken under the tongue), or a fever or chills lasting more than a day.   Redness at the incision site.   Any fluid or blood draining from the incision, especially if it smells bad.    Severe pain that doesn t improve with pain medicine.      We will call you 2 to 4 days after surgery to review this handout, answer questions and help arrange after-surgery care. If you have questions or concerns, please call [phone] during regular office hours. If you need to call after business hours, call [phone] and ask to page the surgeon on-call.              Transversus Abdominis Plane (TAP) Pain Block      What is a TAP block?   A TAP block can help you manage your pain after surgery. TAP stands for transversus abdominis plane, which is a muscle layer in your abdomen (belly). The TAP block uses numbing medicine similar to Novocaine to block pain near the site of your surgery.       Why get a TAP block?   To better manage your pain after surgery. A tap block will help keep the pain from getting severe and out of control.   To block pain signals from the nerve, which helps decrease pain after surgery.   To help you sleep, easily breathe deeply, walk and visit with others.      How is it done?   You will lie still on a table. We  will use an ultrasound machine to help us see the correct muscle layer of your abdomen. Then, we ll use a needle to inject the medicine. We may also give you some sleep medicine to lessen the pain of the injection.       The procedure takes between 5 and 15 minutes. It is usually done right before surgery, but will sometimes be after. It depends on your surgery and care needs.      What can I expect?   You may feel numbness, tingling or a heaviness in your abdomen.    You may have pain control up to 72 hours after surgery.   The TAP block may not lessen all of your surgery pain. But most patients feel 50 to 75 percent less pain than without the block.       Tell your nurse if you have:   Numbness or tingling in areas other than where the injection was   Blurry vision   Ringing in your ears   A metallic taste in your mouth       If you or your  are deaf or hard of hearing, or prefer a language other than English, please let us know.  We have many free services, including interpreters and other aids to help you communicate. You may ask for help  through any member of your care team or by calling Language Services at [phone], option 2.

## 2024-02-13 NOTE — LETTER
2/13/2024       RE: Federico Bentley  [address]  [place] [address]     Dear Dr. Shafer,    I saw Federico Bentley in my general surgery clinic for evaluation of his symptomatic umbilical hernia. 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 On his March 1 ultrasound, he had no ascites, and when I saw him in clinic I calculated his cirrhosis grade to be Child's A. I have counseled him that this would be an opportune window to fix his hernia, with his ascites controlled and his lower (relative) cirrhosis risk. I recommended performing a minimally invasive repair with pre-peritoneal mesh placement. Ideally this would reduce risk of recurrence if his ascites were to return.     However, we just got his most recent A1c back and it was 9.5 and so we have postponed any elective repair for now. I talked to him on the phone today and he is aware his blood sugars need to be better controlled before surgery and he told me you write his metformin and he will be reaching out to your clinic. Our goal for him would be A1c less than 8.0 before proceeding with elective mesh repair.    Thank you for allowing me to participate in the care of your patient. I'll continue to follow along as he gets his sugars better controlled, and moreover, I hope he continues to find success in dealing with his substance history and underlying cirrhosis. If and when it's safe to do so, we'll get this hernia fixed for him.    Sincerely,    Parmjit Beaulieu MD

## 2024-02-16 ENCOUNTER — PATIENT OUTREACH (OUTPATIENT)
Dept: GASTROENTEROLOGY | Facility: CLINIC | Age: 59
End: 2024-02-16
Payer: COMMERCIAL

## 2024-02-16 NOTE — PROGRESS NOTES
Attempted to reach patient for check in X3, no answer, message left requesting call back, number provided.    Will re-attempt outreach in 7-10 days.    Salome Marie, RN Care Coordinator  [facility]  Hepatology Clinic/Specialty Program

## 2024-02-19 ENCOUNTER — PREP FOR PROCEDURE (OUTPATIENT)
Dept: SURGERY | Facility: CLINIC | Age: 59
End: 2024-02-19
Payer: COMMERCIAL

## 2024-02-19 NOTE — PROGRESS NOTES
Dr. Beaulieu would like to do hernia repair with the robot instead of open. Case mod placed. Dr. Beaulieu to call patient to explain. Will send updated surgery instructions via [employer].

## 2024-02-22 ENCOUNTER — PATIENT OUTREACH (OUTPATIENT)
Dept: GASTROENTEROLOGY | Facility: CLINIC | Age: 59
End: 2024-02-22
Payer: COMMERCIAL

## 2024-02-22 NOTE — PROGRESS NOTES
"Spoke with pt for check in. S/p TIPS placement on 1/24. Pt reports he has been \"feeling good\" and \"eating good,\" states his \"swelling has gone way down.\" Continues with furosemide 20mg daily. Is scheduled for paracentesis tomorrow, states he doesn't feel like he will need one but just wants to \"get checked\". Is scheduled for hernia repair surgery on 3/7/24.  Pt currently taking only rifaximin, reports 3-4 stools daily. Denies any mental fogginess or overt confusion. States he's been \"feeling great\" the past few weeks. Will continue to monitor for signs and symptoms of HE.  Denies any melena, hematochezia, or hematemesis. Denies any fever, chills, or sweats.  Will check in with pt in 7-10 days.    Salome Marie, RN Care Coordinator  [facility]  Hepatology Clinic/Specialty Program   "

## 2024-03-01 ENCOUNTER — LAB (OUTPATIENT)
Dept: LAB | Facility: CLINIC | Age: 59
End: 2024-03-01
Payer: COMMERCIAL

## 2024-03-01 ENCOUNTER — ANCILLARY PROCEDURE (OUTPATIENT)
Dept: ULTRASOUND IMAGING | Facility: CLINIC | Age: 59
End: 2024-03-01
Attending: STUDENT IN AN ORGANIZED HEALTH CARE EDUCATION/TRAINING PROGRAM
Payer: COMMERCIAL

## 2024-03-01 ENCOUNTER — OFFICE VISIT (OUTPATIENT)
Dept: SURGERY | Facility: CLINIC | Age: 59
End: 2024-03-01
Payer: COMMERCIAL

## 2024-03-01 ENCOUNTER — PRE VISIT (OUTPATIENT)
Dept: SURGERY | Facility: CLINIC | Age: 59
End: 2024-03-01

## 2024-03-01 VITALS
TEMPERATURE: 97.9 F | DIASTOLIC BLOOD PRESSURE: 85 MMHG | HEIGHT: 72 IN | HEART RATE: 98 BPM | SYSTOLIC BLOOD PRESSURE: 145 MMHG | WEIGHT: 166 LBS | BODY MASS INDEX: 22.48 KG/M2 | OXYGEN SATURATION: 100 %

## 2024-03-01 DIAGNOSIS — E11.9 TYPE 2 DIABETES MELLITUS WITHOUT COMPLICATION, WITHOUT LONG-TERM CURRENT USE OF INSULIN (H): ICD-10-CM

## 2024-03-01 DIAGNOSIS — K70.31 ALCOHOLIC CIRRHOSIS OF LIVER WITH ASCITES (H): ICD-10-CM

## 2024-03-01 DIAGNOSIS — K42.9 UMBILICAL HERNIA WITHOUT OBSTRUCTION AND WITHOUT GANGRENE: ICD-10-CM

## 2024-03-01 DIAGNOSIS — Z01.818 PREOP EXAMINATION: Primary | ICD-10-CM

## 2024-03-01 DIAGNOSIS — Z01.818 PREOP EXAMINATION: ICD-10-CM

## 2024-03-01 DIAGNOSIS — Z95.828 S/P TIPS (TRANSJUGULAR INTRAHEPATIC PORTOSYSTEMIC SHUNT): ICD-10-CM

## 2024-03-01 LAB
ERYTHROCYTE [DISTWIDTH] IN BLOOD BY AUTOMATED COUNT: 19.9 % (ref 10–15)
HCT VFR BLD AUTO: 35.3 % (ref 40–53)
HGB BLD-MCNC: 10.9 G/DL (ref 13.3–17.7)
MCH RBC QN AUTO: 26 PG (ref 26.5–33)
MCHC RBC AUTO-ENTMCNC: 30.9 G/DL (ref 31.5–36.5)
MCV RBC AUTO: 84 FL (ref 78–100)
PLATELET # BLD AUTO: 58 10E3/UL (ref 150–450)
RBC # BLD AUTO: 4.19 10E6/UL (ref 4.4–5.9)
WBC # BLD AUTO: 5.3 10E3/UL (ref 4–11)

## 2024-03-01 PROCEDURE — 99000 SPECIMEN HANDLING OFFICE-LAB: CPT | Performed by: PATHOLOGY

## 2024-03-01 PROCEDURE — 83036 HEMOGLOBIN GLYCOSYLATED A1C: CPT

## 2024-03-01 PROCEDURE — 93975 VASCULAR STUDY: CPT | Performed by: RADIOLOGY

## 2024-03-01 PROCEDURE — 85027 COMPLETE CBC AUTOMATED: CPT | Performed by: PATHOLOGY

## 2024-03-01 PROCEDURE — 99204 OFFICE O/P NEW MOD 45 MIN: CPT

## 2024-03-01 PROCEDURE — 36415 COLL VENOUS BLD VENIPUNCTURE: CPT | Performed by: PATHOLOGY

## 2024-03-01 ASSESSMENT — COPD QUESTIONNAIRES: COPD: 0

## 2024-03-01 ASSESSMENT — PAIN SCALES - GENERAL: PAINLEVEL: NO PAIN (0)

## 2024-03-01 ASSESSMENT — LIFESTYLE VARIABLES: TOBACCO_USE: 1

## 2024-03-01 ASSESSMENT — ENCOUNTER SYMPTOMS: SEIZURES: 0

## 2024-03-01 NOTE — PATIENT INSTRUCTIONS
Preparing for Your Surgery      Name:  Federico Bentley   MRN:  [id number]   :  1965   Today's Date:  3/1/2024       Arriving for surgery:  Surgery date:  3/7/24  Arrival time:  10:40 am  Surgery time: 12:40 pm    Please come to:     Please come to:      [facility] [address]  [address] [place]  [address]      The [facility] Patient /Visitor Ramp is located at [address]. Patients and visitors who self-park will receive the reduced hospital parking rate. If the Patient /Visitor Ramp is full, please follow the signs to the  parking located at the main hospital entrance.     parking is available ( 24 hours/ 7 days a week)    Discounted parking pass options are available for patients and visitors. They can be purchased at the [employer] desk at the main hospital entrance.    -    Stop at the security desk and they will direct surgery patients to the 3rd floor Surgery Waiting Room. [phone]     -  If you are in need of directions, wheelchair or escort please stop at the Information/security desk in the lobby.       What can I eat or drink?  -  You may eat and drink normally up to 8 hours prior to arrival time. (Until 2:40 am on 3/7/24)  -  You may have clear liquids until 2 hours prior to arrival time. (Until 8:40 am on 3/7/24)    Examples of clear liquids:  Water  Clear broth  Juices (apple, white grape, white cranberry  and cider) without pulp  Noncarbonated, powder based beverages  (lemonade and Hussein-Aid)  Sodas (Sprite, 7-Up, ginger ale and seltzer)  Coffee or tea (without milk or cream)  Gatorade    -  No Alcohol or cannabis products for at least 24 hours before surgery.     Which medicines can I take?    Hold Ibuprofen (Advil, Motrin) for 1 day(s) before surgery--unless otherwise directed by surgeon.  Hold Naproxen (Aleve) for 4 days before surgery.    -  DO NOT take these medications the day of  surgery:   Furosemide (lasix)   Metformin (glucophage)     -  PLEASE TAKE these medications the day of surgery:   Pantoprazole (protonix)   Rifaximin (xifaxan)      How do I prepare myself?  - Please take 2 showers (one the night prior to surgery and one the morning of surgery) using Scrubcare or Hibiclens soap.    Use this soap only from the neck to your toes.     Leave the soap on your skin for one minute--then rinse thoroughly.      You may use your own shampoo and conditioner. No other hair products.   - Please remove all jewelry and body piercings.  - No lotions, deodorants or fragrance.  - Bring your ID and insurance card.    -If you use a CPAP machine, please bring the CPAP machine, tubing, and mask to hospital.    -If you have a Deep Brain Stimulator, Spinal Cord Stimulator, or any Neuro Stimulator device---you must bring the remote control to the hospital.      ALL PATIENTS GOING HOME THE SAME DAY OF SURGERY ARE REQUIRED TO HAVE A RESPONSIBLE ADULT TO DRIVE AND BE IN ATTENDANCE WITH THEM FOR 24 HOURS FOLLOWING SURGERY.    Covid testing policy as of 12/06/2022  Your surgeon will notify and schedule you for a COVID test if one is needed before surgery--please direct any questions or COVID symptoms to your surgeon      Questions or Concerns:    - For any questions regarding the day of surgery or your hospital stay, please contact the Pre Admission Nursing Office at [phone].       - If you have health changes between today and your surgery, please call your surgeon.       - For questions after surgery, please call your surgeons office.           Current Visitor Guidelines    You may have 2 visitors in the pre op area.    Visiting hours: 8 a.m. to 8:30 p.m.    Patients confirmed or suspected to have symptoms of COVID 19 or flu:     No visitors allowed for adult patients.   Children (under age 18) can have 1 named visitor.     People who are sick or showing symptoms of COVID 19 or flu:    Are not allowed to  visit patients--we can only make exceptions in special situations.       Please follow these guidelines for your visit:          Please maintain social distance          Masking is optional--however at times you may be asked to wear a mask for the safety of yourself and others     Clean your hands with alcohol hand . Do this when you arrive at and leave the building and patient room,    And again after you touch your mask or anything in the room.     Go directly to and from the room you are visiting.     Stay in the patient s room during your visit. Limit going to other places in the hospital as much as possible     Leave bags and jackets at home or in the car.     For everyone s health, please don t come and go during your visit. That includes for smoking   during your visit.

## 2024-03-01 NOTE — H&P
Pre-Operative H & P     CC:  Preoperative exam to assess for increased cardiopulmonary risk while undergoing surgery and anesthesia.    Date of Encounter: 3/1/2024  Primary Care Physician:  Chuck Shafer     Reason for visit:   Encounter Diagnoses   Name Primary?    Preop examination Yes    Alcoholic cirrhosis of liver with ascites (H)     Umbilical hernia without obstruction and without gangrene        HPI  Federico Bentley is a 58 year old male who presents for pre-operative H & P in preparation for  Procedure Information       Case: [id number] Date/Time: 03/07/24 1240    Procedure: HERNIORRHAPHY, UMBILICAL, ROBOT-ASSISTED (Abdomen)    Anesthesia type: General    Diagnosis:       Alcoholic cirrhosis of liver with ascites (H) [K70.31]      Umbilical hernia without obstruction and without gangrene [K42.9]    Pre-op diagnosis:       Alcoholic cirrhosis of liver with ascites (H) [K70.31]      Umbilical hernia without obstruction and without gangrene [K42.9]    Location:  OR  /  OR    Providers: Parmjit Beaulieu MD            Federico Bentley is a 59 y/o male with an umbilical hernia in the setting of cirrhosis. He recently underwent TIPS in January after suffering esophageal variceal bleeding in December which was treated with banding. He developed sepsis after the TIPS with hepatic encephalopathy and was admitted to the ICU, treated with pressors and IV antibiotics. He recovered and was discharged. Most recent ultrasound showed patent TIPS and minimal ascites. He continues on lasix, lactulose, and rifaximin for control of his ascites and ammonia levels. He is currently undergoing regular ultrasound surveillance for his ascites and has not required drainage since his TIPS. He consulted with Dr. Beaulieu on 2/9/24 and is now scheduled for the above surgery for further management.    His PMH is otherwise significant for anemia, thrombocytopenia, Type II DM, and history of alcohol abuse (in remission since 9/2023).      History is obtained from the patient and chart review    Hx of abnormal bleeding or anti-platelet use: None.       Past Medical History  Past Medical History:   Diagnosis Date    Alcoholic cirrhosis (H)     Diabetes (H)     Portal hypertension (H)     UGIB (upper gastrointestinal bleed)        Past Surgical History  Past Surgical History:   Procedure Laterality Date    APPENDECTOMY      COLECTOMY PARTIAL      COLONOSCOPY      ESOPHAGOSCOPY, GASTROSCOPY, DUODENOSCOPY (EGD), COMBINED N/A 10/09/2020    Procedure: ESOPHAGOGASTRODUODENOSCOPY (EGD);  Surgeon: Mary Wheatley DO;  Location: [facility]    IR TRANSVEN INTRAHEPATIC PORTOSYST SHUNT  1/24/2024       Prior to Admission Medications  Current Outpatient Medications   Medication Sig Dispense Refill    furosemide (LASIX) 20 MG tablet Take 20 mg by mouth every morning      metFORMIN (GLUCOPHAGE) 500 MG tablet Take 500 mg by mouth every morning      pantoprazole (PROTONIX) 40 MG EC tablet Take 1 tablet (40 mg) by mouth daily (Patient taking differently: Take 40 mg by mouth every morning) 90 tablet 1    rifaximin (XIFAXAN) 550 MG TABS tablet 1 tablet (550 mg) by Oral or NG Tube route 2 times daily 60 tablet 0    furosemide (LASIX) 20 MG tablet Take 1 tablet (20 mg) by mouth daily for 90 days (Patient taking differently: Take 20 mg by mouth daily) 90 tablet 0       Allergies  Allergies   Allergen Reactions    Influenza Virus Vaccine H5n1 Anaphylaxis     twice      Iodine Anaphylaxis, Nausea and Vomiting and Hives     Patient was given [id number] IV contrast for CT scan. Patient had immediate contrast reaction, symptoms include anaphylaxis, vomiting, hives, swollen lips and tongue. Patient was given 0.3mg Epi pen and 50 mg diphenhydramine via IV. Patient continued to have hives and vomiting, swollen lips and tongue, sent to ER. Per radiologist patient MUST be premedicated and scanned in a hospital setting due to reaction. KM  Other Reaction(s): hives, swelling  lips/tongue, vomiting, anaphylaxis    Contrast Dye      Other Reaction(s): hives, swelling lips/tongue, vomiting, anaphylaxis 2012.    1/26/24 Pt received iohexol 350 mgI/mL (OMNIPAQUE) at [facility]. Received Diphenhydramine 50mg IV prior. Pt tolerated contrast dye with no adverse effects.        Social History  Social History     Socioeconomic History    Marital status:      Spouse name: Not on file    Number of children: Not on file    Years of education: Not on file    Highest education level: Not on file   Occupational History    Not on file   Tobacco Use    Smoking status: Every Day     Packs/day: 0.50     Years: 35.00     Additional pack years: 0.00     Total pack years: 17.50     Types: Cigarettes     Passive exposure: Current    Smokeless tobacco: Never    Tobacco comments:     1/2 pack a day    Vaping Use    Vaping Use: Never used   Substance and Sexual Activity    Alcohol use: Not Currently     Comment: Sober since 9/16/23    Drug use: Not Currently    Sexual activity: Not on file   Other Topics Concern    Not on file   Social History Narrative    Not on file     Social Determinants of Health     Financial Resource Strain: Not on file   Food Insecurity: Not on file   Transportation Needs: Not on file   Physical Activity: Not on file   Stress: Not on file   Social Connections: Not on file   Interpersonal Safety: Not on file   Housing Stability: Not on file       Family History  Family History   Problem Relation Age of Onset    Anesthesia Reaction No family hx of     Venous thrombosis No family hx of        Review of Systems  The complete review of systems is negative other than noted in the HPI or here.   Anesthesia Evaluation   Pt has had prior anesthetic.     No history of anesthetic complications       ROS/MED HX  ENT/Pulmonary:  - neg pulmonary ROS   (+)                tobacco use, Current use,                    (-) asthma, COPD, sleep apnea and recent URI   Neurologic:  - neg  neurologic ROS  (-) no seizures and no CVA   Cardiovascular:     (+)  - -   -  - -                                 Previous cardiac testing   Echo: Date: 12/20/23 Results:  Interpretation Summary     * The left ventricle is normal size. The left ventricular systolic function   is hyperdynamic, quantified ejection fraction is 68%.     * Left ventricular wall motion is normal.     * The right ventricle is normal size with normal right ventricular systolic   function.    * Due to inadequate tricuspid regurgitant velocity, unable to calculate   right ventricular systolic pressure.     * There is no hemodynamically significant valvular heart disease.     * There is no pericardial effusion.     * No prior study.     Stress Test:  Date: Results:    ECG Reviewed:  Date: 1/24/24 Results:  SR  Cath:  Date: Results:   (-) hypertension and dyslipidemia   METS/Exercise Tolerance: >4 METS Comment: - Walking > 1 hour, able to climb stairs, and house chores without exertional symptoms.    Hematologic: Comments: - Thrombocytopenia     (+)      anemia, history of blood transfusion, no previous transfusion reaction, Known PRBC Anitbodies:No    (-) history of blood clots   Musculoskeletal:  - neg musculoskeletal ROS     GI/Hepatic: Comment: - Alcoholic cirrhosis of liver with ascites s/p TIPS 1/24/24  - Umbilical hernia   - History esophageal varices/upper GI bleed s/p banding 12/2023  - History of diverticulitis s/p partial colectomy (2008)     (+) GERD, Asymptomatic on medication,           liver disease,       Renal/Genitourinary:  - neg Renal ROS  (-) renal disease   Endo:     (+)  type II DM, Last HgA1c: 8.8, date: 1/27/24, Not using insulin,  Normal glucose range: Avg BG at home 130-160s,            (-) chronic steroid usage   Psychiatric/Substance Use:     (+)   alcohol abuse      Infectious Disease:  - neg infectious disease ROS     Malignancy:  - neg malignancy ROS     Other:            BP (!) 145/85 (BP Location: Right arm,  "Patient Position: Sitting, Cuff Size: Adult Regular)   Pulse 98   Temp 97.9  F (36.6  C) (Oral)   Ht 1.816 m (5' 11.5\")   Wt 75.3 kg (166 lb)   SpO2 100%   BMI 22.83 kg/m      Physical Exam   Constitutional: Awake, alert, cooperative, no apparent distress, and appears stated age.  Eyes: Pupils equal, round and reactive to light, extra ocular muscles intact, sclera clear, conjunctiva normal.  HENT: Normocephalic, oral pharynx with moist mucus membranes, good dentition. No goiter appreciated.   Respiratory: Clear to auscultation bilaterally, no crackles or wheezing.  Cardiovascular: Regular rate and rhythm, normal S1 and S2, and no murmur noted.  Carotids +2, no bruits. No edema. Palpable pulses to radial arteries.   GI: Normal bowel sounds, soft, non-distended, non-tender, no masses palpated, no hepatosplenomegaly. With umbilical hernia present.    Lymph/Hematologic: No cervical lymphadenopathy and no supraclavicular lymphadenopathy.  Genitourinary:  Deferred.   Skin: Warm and dry.    Musculoskeletal: Full ROM of neck. There is no redness, warmth, or swelling of the joints. Gross motor strength is normal.    Neurologic: Awake, alert, oriented to name, place and time. Cranial nerves II-XII are grossly intact. Gait is normal.   Neuropsychiatric: Calm, cooperative. Normal affect.     Prior Labs/Diagnostic Studies   All labs and imaging personally reviewed     Component      Latest Ref Rng 2/9/2024  11:24 AM   Sodium      135 - 145 mmol/L 138    Potassium      3.4 - 5.3 mmol/L 3.8    Carbon Dioxide (CO2)      22 - 29 mmol/L 25    Anion Gap      7 - 15 mmol/L 7    Urea Nitrogen      6.0 - 20.0 mg/dL 8.0    Creatinine      0.67 - 1.17 mg/dL 0.71    GFR Estimate      >60 mL/min/1.73m2 >90    Calcium      8.6 - 10.0 mg/dL 8.3 (L)    Chloride      98 - 107 mmol/L 106    Glucose      70 - 99 mg/dL 163 (H)    Alkaline Phosphatase      40 - 150 U/L 118    AST      0 - 45 U/L 50 (H)    ALT      0 - 70 U/L 42    Protein " Total      6.4 - 8.3 g/dL 6.4    Albumin      3.5 - 5.2 g/dL 2.7 (L)    Bilirubin Total      <=1.2 mg/dL 2.2 (H)    WBC      4.0 - 11.0 10e3/uL 5.6    RBC Count      4.40 - 5.90 10e6/uL 3.60 (L)    Hemoglobin      13.3 - 17.7 g/dL 9.5 (L)    Hematocrit      40.0 - 53.0 % 31.6 (L)    MCV      78 - 100 fL 88    MCH      26.5 - 33.0 pg 26.4 (L)    MCHC      31.5 - 36.5 g/dL 30.1 (L)    RDW      10.0 - 15.0 % 21.0 (H)    Platelet Count      150 - 450 10e3/uL 82 (L)       Legend:  (L) Low  (H) High    US Abdomen 2/13/24:  FINDINGS: Small amount of ascites in the right lower quadrant. Very small amount of ascites in the left lower quadrant and left upper quadrant. Trace ascites in the right upper quadrant.     EKG/ stress test - if available please see in ROS above     The patient's records and results personally reviewed by this provider.     Outside records reviewed from: Care Everywhere    LAB/DIAGNOSTIC STUDIES TODAY:  CBC, A1C    Component      Latest Ref Rng 3/1/2024  12:40 PM   WBC      4.0 - 11.0 10e3/uL 5.3    RBC Count      4.40 - 5.90 10e6/uL 4.19 (L)    Hemoglobin      13.3 - 17.7 g/dL 10.9 (L)    Hematocrit      40.0 - 53.0 % 35.3 (L)    MCV      78 - 100 fL 84    MCH      26.5 - 33.0 pg 26.0 (L)    MCHC      31.5 - 36.5 g/dL 30.9 (L)    RDW      10.0 - 15.0 % 19.9 (H)    Platelet Count      150 - 450 10e3/uL 58 (L)    Hemoglobin A1C      <5.7 % 9.5 (H)       Legend:  (L) Low  (H) High      Assessment    Federico Bentley is a 58 year old male seen as a PAC referral for risk assessment and optimization for anesthesia.    Plan/Recommendations  Pt will be optimized for the proposed procedure.  See below for details on the assessment, risk, and preoperative recommendations    NEUROLOGY  - No history of TIA, CVA or seizure  -Post Op delirium risk factors:  No risk identified    ENT  - No current airway concerns.  Will need to be reassessed day of surgery.  Mallampati: II  TM: > 3    CARDIAC  - No history of CAD,  "Hypertension, and Afib Patient is able to achieve > 4 METS and denies any symptoms of CP, chest tightness, or SOB.  - EF of 68% without significant value disease per recent echo.    - METS (Metabolic Equivalents)  Patient performs 4 or more METS exercise without symptoms            Total Score: 0      RCRI-Very low risk: Class 1 0.4% complication rate            Total Score: 0        PULMONARY  HUMPHREY Low Risk            Total Score: 2    HUMPHREY: Over 50 ys old    HUMPHREY: Male      - Denies asthma or inhaler use  - Smoking 1/2 ppd. Smoking cessation counseling offered today but patient declined.   - Tobacco History    History   Smoking Status    Every Day    Packs/day: 0.50    Years: 35.00    Types: Cigarettes   Smokeless Tobacco    Never       GI  - GERD  Controlled on medications: Proton Pump Inhibitor  - Alcoholic cirrhosis of liver with ascites s/p TIPS 1/24/24. Recent abdominal ultrasound showing patent TIPS. Patient has not required paracentesis since TIPS. Last INR on 2/9/24 was 1.45. Patient denies any symptoms of easy bleeding or bruising.   - Umbilical hernia (see HPI) - surgery as above.   - History esophageal varices/upper GI bleed s/p banding 12/2023.  - History of diverticulitis s/p partial colectomy (2008)   PONV Low Risk  Total Score: 1           1 AN PONV: Intended Post Op Opioids        /RENAL  - Baseline Creatinine  0.71    ENDOCRINE    - BMI: Estimated body mass index is 22.83 kg/m  as calculated from the following:    Height as of this encounter: 1.816 m (5' 11.5\").    Weight as of this encounter: 75.3 kg (166 lb).  Overweight (BMI 25.0-29.9)  - Diabetes  Diabetes Mellitus, Type II, non-insulin dependent.  Last A1C on 1/27/24 was 8.8. Patient reports average BG over the past 2 weeks have been 130's-160s. Discussed with Dr. Apodaca who recommended rechecking A1C today, which came back at 9.5.  Dr. Beaulieu notified.   Hold morning oral hypoglycemic medications. Recommend close monitoring of the patient's " blood glucose levels throughout the perioperative period.    HEME  VTE Low Risk 0.5%            Total Score: 2    VTE: Male      - No history of abnormal bleeding or antiplatelet use.  - Chronic anemia. Hgb 10.9 today.   Recommend perioperative use of blood conservation techniques intraoperatively and close monitoring for postoperative bleeding.  A type and screen has not been done and deferred to the team day of surgery  - Thrombocytopenia. Plts today are 58,000 (baseline 60-80 over past month). Dr. Beaulieu notified.       PSYCH/SUBSTANCE  - Hx of alcohol abuse (in remission since 9/2023)    Different anesthesia methods/types have been discussed with the patient, but they are aware that the final plan will be decided by the assigned anesthesia provider on the date of service.    Case discussed with Dr. Apodaca       The patient is optimized for their procedure. AVS with information on surgery time/arrival time, meds and NPO status given by nursing staff. No further diagnostic testing indicated.      On the day of service:     Prep time: 6 minutes  Visit time: 23 minutes  Documentation time: 20 minutes  ------------------------------------------  Total time: 49 minutes      ELMER Elliott CNP  Preoperative Assessment Center  [facility]  Clinic and Surgery Center  Phone: [phone]  Fax: [phone]

## 2024-03-02 LAB — HBA1C MFR BLD: 9.5 %

## 2024-03-04 ENCOUNTER — CARE COORDINATION (OUTPATIENT)
Dept: SURGERY | Facility: CLINIC | Age: 59
End: 2024-03-04
Payer: COMMERCIAL

## 2024-03-04 ENCOUNTER — PATIENT OUTREACH (OUTPATIENT)
Dept: GASTROENTEROLOGY | Facility: CLINIC | Age: 59
End: 2024-03-04
Payer: COMMERCIAL

## 2024-03-04 ENCOUNTER — VIRTUAL VISIT (OUTPATIENT)
Dept: RADIOLOGY | Facility: CLINIC | Age: 59
End: 2024-03-04
Attending: STUDENT IN AN ORGANIZED HEALTH CARE EDUCATION/TRAINING PROGRAM
Payer: COMMERCIAL

## 2024-03-04 DIAGNOSIS — Z95.828 S/P TIPS (TRANSJUGULAR INTRAHEPATIC PORTOSYSTEMIC SHUNT): ICD-10-CM

## 2024-03-04 DIAGNOSIS — K76.82 HEPATIC ENCEPHALOPATHY (H): ICD-10-CM

## 2024-03-04 DIAGNOSIS — K70.31 ALCOHOLIC CIRRHOSIS OF LIVER WITH ASCITES (H): ICD-10-CM

## 2024-03-04 DIAGNOSIS — K76.6 PORTAL HYPERTENSION (H): Primary | ICD-10-CM

## 2024-03-04 PROCEDURE — 99214 OFFICE O/P EST MOD 30 MIN: CPT | Mod: 95 | Performed by: STUDENT IN AN ORGANIZED HEALTH CARE EDUCATION/TRAINING PROGRAM

## 2024-03-04 RX ORDER — FUROSEMIDE 20 MG
20 TABLET ORAL DAILY
Qty: 90 TABLET | Refills: 1 | Status: SHIPPED | OUTPATIENT
Start: 2024-03-04 | End: 2024-04-23

## 2024-03-04 ASSESSMENT — PAIN SCALES - GENERAL: PAINLEVEL: NO PAIN (0)

## 2024-03-04 NOTE — PROGRESS NOTES
Pt called writer and left message requesting refills of rifaximin and furosemide, refills sent to pt's preferred pharmacy.    Attempted to reach patient for check in, no answer, message left requesting call back, number provided.    Salome Marie RN Care Coordinator  [facility]  Hepatology Clinic/Specialty Program

## 2024-03-04 NOTE — PROGRESS NOTES
Spoke with pt for check in. He denies any abdominal distention or leg swelling. Continues with furosemide 20mg daily. Continues with rifaximin alone, denies any mental fogginess or overt confusion. Denies any fever, chills, or sweats. Denies any melena, hematochezia, or hematemesis. Pt was scheduled for hernia repair surgery on 3/7, he reports that this was cancelled today due to his HgbA1C being too high at his pre-op assessment appt. States the plan is to schedule a diabetes follow-up with his PCP and recheck A1C in 1-2 months.  Will check back in with pt in 2-3 weeks.    Salome Marie, RN Care Coordinator  [facility]  Hepatology Clinic/Specialty Program

## 2024-03-04 NOTE — PROGRESS NOTES
"Virtual Visit Details    Type of service:  Video Visit     Originating Location (pt. Location): {video visit patient location:[id number]::\"Home\"}  {PROVIDER LOCATION On-site should be selected for visits conducted from your clinic location or adjoining [facility] hospital, academic office, or other nearby [facility] building. Off-site should be selected for all other provider locations, including home:[id number]}  Distant Location (provider location):  {virtual location provider:[id number]}  Platform used for Video Visit: {Virtual Visit Platforms:[id number]::\"Apartment [employer]da\"}  "

## 2024-03-04 NOTE — PROGRESS NOTES
Dr. Beaulieu called patient to discuss that his A1c is too high to move forward with hernia repair surgery. Patient verbalized understanding although frustrated. He will follow-up with his PCP to get his A1c to 8 or less. Once he has achieved this goal he can reach out to General Surgery to reschedule his surgery.

## 2024-03-04 NOTE — LETTER
3/4/2024         RE: Federico Bentley  [address] [place] [address]        Dear Colleague,    Thank you for referring your patient, Federico Bentley, to the [facility]. Please see a copy of my visit note below.        INTERVENTIONAL RADIOLOGY Follow-up    Name: Federico Bentley  Age: 58 year old   Referring Physician: Dr. Arenas   REASON FOR REFERRAL: Follow up TIPS placement      HPI:   Federico Bentley is a 58 year old male with a history of ETOH cirrhosis resulting in portal hypertension, refractory ascites and 2 prior episodes of variceal bleeding now status post TIPS placement on 1/24/2024. Stent was dilated to 10 mm with a residual gradient of 12.    Post procedurally he was feeling well but about a day and a half later he became confused and was performing odd behaviors - he describes trying to eat ice cream with the handle of his spoon. He was taken to the ED by his wife and was found to be in septic shock and was having hepatic encephalopathy for which he was treated. Since discharge on 1/31/2024 he has been feeling well.    He has not required a paracentesis since TIPS placement. He denies any new shortness of breath or any recurrent encephalopathic symptoms since his TIPS placement. He has had some leg swelling which has improved on medical therapy and is near resolved. He is present only rifaximin 550 mg BID and lasix 20 mg daily for his medical management.    PAST MEDICAL HISTORY:   Past Medical History:   Diagnosis Date    Alcoholic cirrhosis (H)     Diabetes (H)     Portal hypertension (H)     UGIB (upper gastrointestinal bleed)        PAST SURGICAL HISTORY:   Past Surgical History:   Procedure Laterality Date    APPENDECTOMY      COLECTOMY PARTIAL      COLONOSCOPY      ESOPHAGOSCOPY, GASTROSCOPY, DUODENOSCOPY (EGD), COMBINED N/A 10/09/2020    Procedure: ESOPHAGOGASTRODUODENOSCOPY (EGD);  Surgeon: Mary Wheatley DO;  Location:  GI    IR TRANSVEN  INTRAHEPATIC PORTOSYST SHUNT  1/24/2024       FAMILY HISTORY:   Family History   Problem Relation Age of Onset    Anesthesia Reaction No family hx of     Venous thrombosis No family hx of        SOCIAL HISTORY:   Social History     Tobacco Use    Smoking status: Every Day     Packs/day: 0.50     Years: 35.00     Additional pack years: 0.00     Total pack years: 17.50     Types: Cigarettes     Passive exposure: Current    Smokeless tobacco: Never    Tobacco comments:     1/2 pack a day    Substance Use Topics    Alcohol use: Not Currently     Comment: Sober since 9/16/23       PROBLEM LIST:   Patient Active Problem List    Diagnosis Date Noted    Septic shock (H) 01/27/2024     Priority: Medium    S/P TIPS (transjugular intrahepatic portosystemic shunt) 01/24/2024     Priority: Medium    Diverticulitis of colon 11/27/2023     Priority: Medium    Alcoholic cirrhosis (H) 11/19/2023     Priority: Medium    Alcohol abuse 09/21/2023     Priority: Medium    Diverticular disease 09/21/2023     Priority: Medium     Formatting of this note might be different from the original. had 18 inches removed      Microalbuminuria due to type 2 diabetes mellitus (H) 09/19/2022     Priority: Medium    GI bleed 10/09/2020     Priority: Medium    Secondary esophageal varices with bleeding (H) 09/14/2020     Priority: Medium    Hepatic cirrhosis (H) 09/06/2020     Priority: Medium    Portal hypertension (H) 09/06/2020     Priority: Medium    UGIB (upper gastrointestinal bleed) 09/04/2020     Priority: Medium    Type 2 diabetes mellitus, without long-term current use of insulin (H) 05/20/2020     Priority: Medium    History of diverticulitis of colon 12/12/2012     Priority: Medium    Sigmoid diverticulitis 05/18/2010     Priority: Medium       MEDICATIONS:   Prescription Medications as of 3/4/2024         Rx Number Disp Refills Start End Last Dispensed Date Next Fill Date Owning Pharmacy    furosemide (LASIX) 20 MG tablet  90 tablet 0  2024   [id number] - [place], [place] - [address]    Sig: Take 1 tablet (20 mg) by mouth daily for 90 days    Class: E-Prescribe    Route: Oral    furosemide (LASIX) 20 MG tablet  -- -- 2024 --       Sig: Take 20 mg by mouth every morning    Class: Historical    Route: Oral    metFORMIN (GLUCOPHAGE) 500 MG tablet  -- -- 2024 --       Sig: Take 500 mg by mouth every morning    Class: Historical    Route: Oral    pantoprazole (PROTONIX) 40 MG EC tablet  90 tablet 1 2023 --   [id number] - [place], [place] - [address]    Sig: Take 1 tablet (40 mg) by mouth daily    Class: E-Prescribe    Route: Oral    rifaximin (XIFAXAN) 550 MG TABS tablet  60 tablet 0 2024 --   [id number] - [place], [place] - [address]    Si tablet (550 mg) by Oral or NG Tube route 2 times daily    Class: E-Prescribe    Notes to Pharmacy: Future refills by PCP Dr. Chuck Shafer with phone number [phone].    Route: Oral or NG Tube    Renewals       Renewal requests to authorizing provider (Elvira Penny MD) <b>prohibited</b>                    ALLERGIES:   Iodine and Contrast dye    ROS:  Negative unless otherwise stated in HPI.      Physical Examination:   VITALS:   There were no vitals taken for this visit.  Gen: alert and oriented x3  HEENT: normocephalic and atraumatic, wearing glasses  Neck: supple      Labs:    BMP RESULTS:  Lab Results   Component Value Date     2024     10/12/2020    POTASSIUM 3.8 2024    POTASSIUM 4.2 10/12/2020    CHLORIDE 106 2024    CHLORIDE 108 10/12/2020    CO2 25 2024    CO2 26 10/12/2020    ANIONGAP 7 2024    ANIONGAP 6 10/12/2020     (H) 2024     (H) 2024     (H) 10/12/2020    BUN 8.0 2024    BUN 14 10/12/2020    CR 0.71 2024    CR 0.92 10/12/2020    GFRESTIMATED >90 2024    GFRESTIMATED >90 10/12/2020    GFRESTBLACK >90 10/12/2020     CONSTANZA 8.3 (L) 02/09/2024    CONSTANZA 8.2 (L) 10/12/2020        CBC RESULTS:  Lab Results   Component Value Date    WBC 5.3 03/01/2024    WBC 7.4 10/12/2020    RBC 4.19 (L) 03/01/2024    RBC 2.88 (L) 10/12/2020    HGB 10.9 (L) 03/01/2024    HGB 8.5 (L) 10/12/2020    HCT 35.3 (L) 03/01/2024    HCT 27.5 (L) 10/12/2020    MCV 84 03/01/2024    MCV 96 10/12/2020    MCH 26.0 (L) 03/01/2024    MCH 29.5 10/12/2020    MCHC 30.9 (L) 03/01/2024    MCHC 30.9 (L) 10/12/2020    RDW 19.9 (H) 03/01/2024    RDW 14.9 10/12/2020    PLT 58 (L) 03/01/2024    PLT 85 (L) 10/12/2020       INR/PTT:  Lab Results   Component Value Date    INR 1.45 (H) 02/09/2024    INR 1.16 (H) 10/11/2020    PTT 40 (H) 01/28/2024    PTT 28 10/09/2020       Diagnostic studies:   TIPS ultrasound 3/1/2024 shows a patent TIPS without evidence of dysfunction.    IMPRESSION: Patent TIPS. Right and left portal vein velocities are  slower but they remain appropriately retrograde in flow towards the  TIPS. No ascites.    Assessment   Federico Bentley is a 58 year old male status post TIPS placement on 1/24/2024 for refractory ascites and prior episodes of variceal bleeding. His post procedural course was complicated by hepatic encephalopathy, however he is now doing well on medical management. TIPS stent appears to be functioning well on ultrasound. Patient can begin routine follow-up    Plan   - Follow up in PA clinic with TIPS ultrasound Quarterly for 1 year post TIPS placement then semiannually thereafter.   - Patient would like to see an allergist to determine if contrast allergy is real   - Allergist consult      CC  Patient Care Team:  Chuck Shafer MD as PCP - General (Family Medicine)  Ayse Flannery PA-C as Physician Assistant (Gastroenterology)  Salome Marie RN as Specialty Care Coordinator  Bee Watkins MD as MD (Gastroenterology)  Carol Molina RN as Specialty Care Coordinator (Vascular and Interventional Radiology)  Parmjit Beaulieu MD as  Physician (Surgery)  Parmjit Beaulieu MD as Assigned Surgical Provider  Bee Watkins MD as Assigned Gastroenterology Provider  DANIELE OMER      ----- Service Performed and Documented by Resident or Fellow ------        Portal hypertension post TIPS placement.     Review of external notes as documented above   Independent interpretation of a test performed by another physician/other qualified health care professional (not separately reported) - TIPS ultrasound 1/26/2024    30 minutes spent by me on the date of the encounter doing chart review, review of test results, interpretation of tests, patient visit, and documentation          Video-Visit Details     Type of service:  Video Visit     Video Start and End Time: 10:25 am - 10:45 am    Originating Location (pt. Location): Home     Distant Location (provider location):  [facility] [place]      Platform used for Video Visit: [email or web address]

## 2024-03-05 DIAGNOSIS — Z91.041 CONTRAST MEDIA ALLERGY: ICD-10-CM

## 2024-03-05 DIAGNOSIS — Z95.828 S/P TIPS (TRANSJUGULAR INTRAHEPATIC PORTOSYSTEMIC SHUNT): Primary | ICD-10-CM

## 2024-03-06 ENCOUNTER — TELEPHONE (OUTPATIENT)
Dept: VASCULAR SURGERY | Facility: CLINIC | Age: 59
End: 2024-03-06
Payer: COMMERCIAL

## 2024-03-06 NOTE — TELEPHONE ENCOUNTER
----- Message from Carol Molina RN sent at 3/5/2024  8:40 AM CST -----  Regarding: PA clinic with imaging  Hi  This pt needs a TIPS US and an appt in the IR PA clinic in ~5 months(end of June/July)    Clinic visit can be virtual    Follow up TIPS    Thanks  Ankita

## 2024-03-06 NOTE — TELEPHONE ENCOUNTER
Left Voicemail (1st Attempt) and Sent Mychart (1st Attempt)   for the patient to call back and schedule the followin Month follow up TIPS      Appointment type: Vas Return Visit   Provider: [facility]   Return date:2024   Specialty phone number: [phone]  Additional appointment(s) needed: TIPS ultrasound  Additonal Notes: N/a  Greg Acosta on 3/6/2024 at 5:24 PM

## 2024-03-13 NOTE — TELEPHONE ENCOUNTER
Left Voicemail (2nd Attempt) for the patient to call back and schedule the followin Month follow up TIPS      Appointment type: Vas Return Visit   Provider: [facility]   Return date:2024   Specialty phone number: [phone]  Additional appointment(s) needed: TIPS ultrasound  Additonal Notes: N/a  Greg Acosta on 3/13/2024 at 4:43 PM

## 2024-03-19 ENCOUNTER — PATIENT OUTREACH (OUTPATIENT)
Dept: GASTROENTEROLOGY | Facility: CLINIC | Age: 59
End: 2024-03-19
Payer: COMMERCIAL

## 2024-04-22 ENCOUNTER — PATIENT OUTREACH (OUTPATIENT)
Dept: GASTROENTEROLOGY | Facility: CLINIC | Age: 59
End: 2024-04-22
Payer: COMMERCIAL

## 2024-04-22 DIAGNOSIS — K70.31 ALCOHOLIC CIRRHOSIS OF LIVER WITH ASCITES (H): Primary | ICD-10-CM

## 2024-04-22 DIAGNOSIS — K21.00 GASTROESOPHAGEAL REFLUX DISEASE WITH ESOPHAGITIS, UNSPECIFIED WHETHER HEMORRHAGE: ICD-10-CM

## 2024-04-22 DIAGNOSIS — K92.2 UGIB (UPPER GASTROINTESTINAL BLEED): ICD-10-CM

## 2024-04-22 NOTE — PROGRESS NOTES
Per Dr. Watkins, she does not have any recommendations in [place], but does recommend pt have an updated liver US to evaluate changes to liver parenchyma noted on CT from 1/26/24. Discussed with pt, he states he will plan to schedule at [facility]. Also discussed with pt that after he moves we will provide refills of his medications for up to a year, but will need to find a new hepatologist to manage his medications. Pt verbalized understanding, in agreement with plan.    Salome Marie, RN Care Coordinator  [facility]  Hepatology Clinic/Specialty Program

## 2024-04-22 NOTE — PROGRESS NOTES
Spoke with pt for check in. Pt states that he and his wife are planning to move to [place] at the end of 05/2024. Pt asks for any hepatologist recommendations in [place], message sent to provider.  Pt states he has stopped taking furosemide, denies any abdominal distention or leg swelling. Continues with rifaximin only, denies any mental fogginess or overt confusion. Denies any melena, hematochezia, or hematemesis. Denies any fever, chills, or sweats. Pt states that pain due to hernia has improved since TIPS, but still plans to move forward with hernia repair in the future. His hernia repair procedure had been cancelled in March due to high HgA1C.  Will reach back out to pt with provider response.    Salome Marie, RN Care Coordinator  [facility]  Hepatology Clinic/Specialty Program

## 2024-04-23 RX ORDER — PANTOPRAZOLE SODIUM 40 MG/1
40 TABLET, DELAYED RELEASE ORAL DAILY
Qty: 90 TABLET | Refills: 0 | Status: SHIPPED | OUTPATIENT
Start: 2024-04-23

## 2024-05-07 ENCOUNTER — MYC MEDICAL ADVICE (OUTPATIENT)
Dept: GASTROENTEROLOGY | Facility: CLINIC | Age: 59
End: 2024-05-07
Payer: COMMERCIAL

## 2024-06-18 ENCOUNTER — PATIENT OUTREACH (OUTPATIENT)
Dept: GASTROENTEROLOGY | Facility: CLINIC | Age: 59
End: 2024-06-18
Payer: COMMERCIAL

## 2024-06-29 ENCOUNTER — HEALTH MAINTENANCE LETTER (OUTPATIENT)
Age: 59
End: 2024-06-29

## 2024-11-16 ENCOUNTER — HEALTH MAINTENANCE LETTER (OUTPATIENT)
Age: 59
End: 2024-11-16

## 2025-01-05 ENCOUNTER — HEALTH MAINTENANCE LETTER (OUTPATIENT)
Age: 60
End: 2025-01-05

## 2025-03-08 ENCOUNTER — HEALTH MAINTENANCE LETTER (OUTPATIENT)
Age: 60
End: 2025-03-08

## 2025-06-22 ENCOUNTER — HEALTH MAINTENANCE LETTER (OUTPATIENT)
Age: 60
End: 2025-06-22

## (undated) RX ORDER — FENTANYL CITRATE 50 UG/ML
INJECTION, SOLUTION INTRAMUSCULAR; INTRAVENOUS
Status: DISPENSED
Start: 2024-01-24

## (undated) RX ORDER — ONDANSETRON 2 MG/ML
INJECTION INTRAMUSCULAR; INTRAVENOUS
Status: DISPENSED
Start: 2024-01-24

## (undated) RX ORDER — AMPICILLIN AND SULBACTAM 2; 1 G/1; G/1
INJECTION, POWDER, FOR SOLUTION INTRAMUSCULAR; INTRAVENOUS
Status: DISPENSED
Start: 2024-01-24

## (undated) RX ORDER — SIMETHICONE 40MG/0.6ML
SUSPENSION, DROPS(FINAL DOSAGE FORM)(ML) ORAL
Status: DISPENSED
Start: 2023-12-11

## (undated) RX ORDER — FENTANYL CITRATE 50 UG/ML
INJECTION, SOLUTION INTRAMUSCULAR; INTRAVENOUS
Status: DISPENSED
Start: 2020-10-09

## (undated) RX ORDER — FENTANYL CITRATE 50 UG/ML
INJECTION, SOLUTION INTRAMUSCULAR; INTRAVENOUS
Status: DISPENSED
Start: 2023-12-11

## (undated) RX ORDER — LIDOCAINE HYDROCHLORIDE 10 MG/ML
INJECTION, SOLUTION EPIDURAL; INFILTRATION; INTRACAUDAL; PERINEURAL
Status: DISPENSED
Start: 2024-01-24

## (undated) RX ORDER — FENTANYL CITRATE-0.9 % NACL/PF 10 MCG/ML
PLASTIC BAG, INJECTION (ML) INTRAVENOUS
Status: DISPENSED
Start: 2024-01-24

## (undated) RX ORDER — DIPHENHYDRAMINE HYDROCHLORIDE 50 MG/ML
INJECTION INTRAMUSCULAR; INTRAVENOUS
Status: DISPENSED
Start: 2020-10-09

## (undated) RX ORDER — SODIUM CHLORIDE, SODIUM LACTATE, POTASSIUM CHLORIDE, CALCIUM CHLORIDE 600; 310; 30; 20 MG/100ML; MG/100ML; MG/100ML; MG/100ML
INJECTION, SOLUTION INTRAVENOUS
Status: DISPENSED
Start: 2024-01-24

## (undated) RX ORDER — PROPOFOL 10 MG/ML
INJECTION, EMULSION INTRAVENOUS
Status: DISPENSED
Start: 2024-01-24

## (undated) RX ORDER — SIMETHICONE 40MG/0.6ML
SUSPENSION, DROPS(FINAL DOSAGE FORM)(ML) ORAL
Status: DISPENSED
Start: 2020-10-09